# Patient Record
Sex: FEMALE | Race: WHITE | URBAN - METROPOLITAN AREA
[De-identification: names, ages, dates, MRNs, and addresses within clinical notes are randomized per-mention and may not be internally consistent; named-entity substitution may affect disease eponyms.]

---

## 2024-01-29 ENCOUNTER — TELEPHONE (OUTPATIENT)
Dept: OBGYN CLINIC | Facility: CLINIC | Age: 39
End: 2024-01-29

## 2024-01-29 NOTE — TELEPHONE ENCOUNTER
Pts LMP of 12/29/2023 making her 4w2d. Pt states regular cycles of every 28-29 days. States +UPT. Pt informed of both male and female providers and the need to rotate PN appt with all since OB on-call will be the one that delivers her. Pt accepts rotation and wishes to proceed with establishing care. Pt instructed to start OTC PNV with DHA, FOLIC ACID AND IRON.  Pt accepts PC OBN on 2/17/2024    Pt has Scleroderma and was followed by MFM in her previous pregnancies.

## 2024-02-05 ENCOUNTER — OFFICE VISIT (OUTPATIENT)
Dept: FAMILY MEDICINE CLINIC | Facility: CLINIC | Age: 39
End: 2024-02-05
Payer: COMMERCIAL

## 2024-02-05 VITALS
BODY MASS INDEX: 28 KG/M2 | HEIGHT: 63 IN | DIASTOLIC BLOOD PRESSURE: 72 MMHG | WEIGHT: 158 LBS | SYSTOLIC BLOOD PRESSURE: 104 MMHG | TEMPERATURE: 98 F | HEART RATE: 80 BPM

## 2024-02-05 DIAGNOSIS — Z34.91 PREGNANT AND NOT YET DELIVERED IN FIRST TRIMESTER: Primary | ICD-10-CM

## 2024-02-05 DIAGNOSIS — M34.9 SCLERODERMA (HCC): ICD-10-CM

## 2024-02-05 DIAGNOSIS — I73.00 RAYNAUD'S DISEASE WITHOUT GANGRENE: ICD-10-CM

## 2024-02-05 DIAGNOSIS — J34.89 RHINORRHEA: ICD-10-CM

## 2024-02-05 DIAGNOSIS — R52 BODY ACHES: ICD-10-CM

## 2024-02-05 DIAGNOSIS — K21.00 GASTROESOPHAGEAL REFLUX DISEASE WITH ESOPHAGITIS WITHOUT HEMORRHAGE: ICD-10-CM

## 2024-02-05 DIAGNOSIS — K22.4 ESOPHAGEAL DYSMOTILITY: ICD-10-CM

## 2024-02-05 PROCEDURE — 99203 OFFICE O/P NEW LOW 30 MIN: CPT | Performed by: FAMILY MEDICINE

## 2024-02-05 RX ORDER — OMEPRAZOLE 20 MG/1
CAPSULE, DELAYED RELEASE ORAL
COMMUNITY

## 2024-02-05 RX ORDER — OMEPRAZOLE 40 MG/1
40 CAPSULE, DELAYED RELEASE ORAL 2 TIMES DAILY
COMMUNITY
Start: 2022-08-16 | End: 2024-02-05 | Stop reason: DRUGHIGH

## 2024-02-05 RX ORDER — ERGOCALCIFEROL (VITAMIN D2) 10 MCG
1000 TABLET ORAL AS DIRECTED
COMMUNITY

## 2024-02-05 RX ORDER — FAMOTIDINE 10 MG
TABLET ORAL
COMMUNITY

## 2024-02-05 NOTE — PROGRESS NOTES
HPI:    Patient ID: Susie Herr is a 39 year old female.      HPI    Chief Complaint   Patient presents with    Establish Care    Body ache and/or chills     Chest discomfort and cough kids go to  feels fatigue just found out that she is pregnant had negative covid test done        Wt Readings from Last 6 Encounters:   02/05/24 158 lb (71.7 kg)     BP Readings from Last 3 Encounters:   02/05/24 104/72   Lmp 12/29/23    Pmh significant for gerd, systemic sclerosis, raynauds,     Last physical May 2023.    New patient , 6 pregnancies total  High risk pregnancy  Has 2 living children, 3 miscarriages, I termination due to cmv.      Moved from [place] sept 2023.  Has 2 boys.    Kids in     Has bad headaches, coughs, body aches, runny nose since 2/1  Was worse yesterday  Home covid test neg.  No fever. But feels warm.  Boys also have coughs.   is also sick with pink eye and cough.        Review of Systems   Constitutional:  Negative for chills and fever.   HENT:  Positive for rhinorrhea. Negative for sinus pressure, sinus pain and sore throat.    Gastrointestinal:  Negative for constipation, diarrhea, nausea and vomiting.   Musculoskeletal:  Positive for myalgias.   Skin:  Negative for rash.       /72   Pulse 80   Temp 98.3 °F (36.8 °C) (Temporal)   Ht 5' 3\" (1.6 m)   Wt 158 lb (71.7 kg)   LMP 12/29/2023 (Exact Date)   BMI 27.99 kg/m²          Current Outpatient Medications   Medication Sig Dispense Refill    cholecalciferol (VITAMIN D3) 10 MCG (400 UNIT) Oral Tab Take 2.5 tablets (1,000 Units total) by mouth As Directed.      famotidine (PEPCID AC) 10 MG Oral Tab       omeprazole 20 MG Oral Capsule Delayed Release        Allergies:Not on File   PHYSICAL EXAM:     Chief Complaint   Patient presents with    Establish Care    Body ache and/or chills     Chest discomfort and cough kids go to  feels fatigue just found out that she is pregnant had negative covid test done        Physical Exam  Vitals reviewed.   HENT:      Right Ear: Tympanic membrane and ear canal normal.      Left Ear: Tympanic membrane and ear canal normal.      Nose: Rhinorrhea present. No congestion.   Cardiovascular:      Rate and Rhythm: Normal rate and regular rhythm.      Pulses: Normal pulses.      Heart sounds: Normal heart sounds.   Pulmonary:      Effort: Pulmonary effort is normal.      Breath sounds: Normal breath sounds.   Abdominal:      General: There is no distension.      Palpations: There is no mass.      Tenderness: There is no abdominal tenderness. There is no right CVA tenderness, left CVA tenderness, guarding or rebound.      Hernia: No hernia is present.   Musculoskeletal:      Cervical back: Normal range of motion and neck supple.   Neurological:      Mental Status: She is alert.                ASSESSMENT/PLAN:     Encounter Diagnoses   Name Primary?    Pregnant and not yet delivered in first trimester Yes    Body aches     Scleroderma (HCC)     Gastroesophageal reflux disease with esophagitis without hemorrhage     Raynaud's disease without gangrene     Esophageal dysmotility     Rhinorrhea        1. Pregnant and not yet delivered in first trimester  Test done  Precaution while results back   - SARS-CoV-2/Flu A and B/RSV by PCR ([facility]); Future  - SARS-CoV-2/Flu A and B/RSV by PCR ([facility])    2. Body aches    - SARS-CoV-2/Flu A and B/RSV by PCR ([facility]); Future  - SARS-CoV-2/Flu A and B/RSV by PCR ([facility])    3. Scleroderma (HCC)    - RHEUMATOLOGY - INTERNAL    4. Gastroesophageal reflux disease with esophagitis without hemorrhage    - Gastro Referral - In Network    5. Raynaud's disease without gangrene    - RHEUMATOLOGY - INTERNAL    6. Esophageal dysmotility      7. Rhinorrhea    - SARS-CoV-2/Flu A and B/RSV by PCR ([facility]); Future  - SARS-CoV-2/Flu A and B/RSV by PCR ([facility])      Orders Placed This Encounter   Procedures    SARS-CoV-2/Flu A and B/RSV by PCR ([facility])         The  above note was creating using Dragon speech recognition technology. Please excuse any typos    Meds This Visit:  Requested Prescriptions      No prescriptions requested or ordered in this encounter       Imaging & Referrals:  RHEUMATOLOGY - INTERNAL  GASTRO - INTERNAL       ID#[id number]

## 2024-02-06 LAB
FLUAV + FLUBV RNA SPEC NAA+PROBE: NOT DETECTED
FLUAV + FLUBV RNA SPEC NAA+PROBE: NOT DETECTED
RSV RNA SPEC NAA+PROBE: NOT DETECTED
SARS-COV-2 RNA RESP QL NAA+PROBE: NOT DETECTED

## 2024-02-17 ENCOUNTER — NURSE ONLY (OUTPATIENT)
Dept: OBGYN CLINIC | Facility: CLINIC | Age: 39
End: 2024-02-17
Payer: COMMERCIAL

## 2024-02-17 ENCOUNTER — TELEPHONE (OUTPATIENT)
Dept: OBGYN CLINIC | Facility: CLINIC | Age: 39
End: 2024-02-17

## 2024-02-17 DIAGNOSIS — Z34.01 ENCOUNTER FOR SUPERVISION OF NORMAL FIRST PREGNANCY IN FIRST TRIMESTER: ICD-10-CM

## 2024-02-17 DIAGNOSIS — O26.851 SPOTTING AFFECTING PREGNANCY IN FIRST TRIMESTER: Primary | ICD-10-CM

## 2024-02-17 DIAGNOSIS — O09.521 MULTIGRAVIDA OF ADVANCED MATERNAL AGE IN FIRST TRIMESTER: ICD-10-CM

## 2024-02-17 RX ORDER — CHOLECALCIFEROL (VITAMIN D3) 25 MCG
1 TABLET,CHEWABLE ORAL DAILY
COMMUNITY

## 2024-02-17 NOTE — PROGRESS NOTES
Pt called for OBN appt today c/o spotting on Sunday and then again last night and today. Pt states it is light pink, states some cramping on Sunday, but not today. Pt also notes a decrease in breast tenderness and nausea. Spoke to ANALI on-call, recs for quants x2 48-72 hrs apart and have pt seen on Monday in office. Give ER precautions. Pt called and informed.       Normal PN labs ordered. Pt advised all labs must be completed and resulted 2-3 days prior to NPN appt. If labs are not completed and resulted the NPN appt will be cancelled. Pt will contact her insurance on Monday to see if she needs to [facility]. Pt informed placed will be placed for [facility] based on insurance but if wants them changed to [place] to call office.     Pt informed again of both male and female providers and the need to rotate PN appt with all providers since OB on-call will be the one that delivers her. Assisted pt with scheduling NPN appt with MD.       Height: 5 ft 3 in  Weight: 155 lb  BMI: 27.46    Partner's name is Kana Herr  contact #[phone]; race: Dax/White   Occupation: RN    MEDICAL HISTORY    Anemia No    Anesthetic complications No    Anxiety/Depression  No    Autoimmune Disorder Yes Scleroderma    Asthma  No    Cancer No    Diabetes  No    Gyne/breast Surgery Yes [place]-2020  D&E-2018  D&C-2017   Heart Disease No    Hepatitis/Liver Disease  No    History of blood transfusion No    History of abnormal pap Yes HPV-2020   Hypertension  No    Infertility  No    Kidney Disease/Frequent UTIs  Yes UTI's in the Post partum state w/first delivery   Medication Allergies No    Latex Allergies No    Food Allergies  No    Neurological Disorder/Epilepsy No    Operations/Hospitalizations Yes Appy w/bowel perforation became septic and had to have laparotomy-2006    [place] teeth    Also see gyne above    TB exposure  No    Thyroid Dysfunction No    Trauma/Violence  No    Uterine Anomaly  No    Uterine Fibroids  No     Variocosities/DVTs No    Smoker No    Drug usage in prior year No    Alcohol Yes Rare prior pregnancy   Would you accept a blood transfusion? If no, are you a Episcopalian? Yes    No     Will accept blood and blood products       INFECTION HISTORY    Chlamydia Yes In College   Pt or partner have hx of Genital Herpes Yes 1 lesion noted on vaginal area in 2022-Type 1 on swab. Pt has never had an outbreak since-did have profolactic    Gonorrhea No    Hepatitis B No    HIV No    HPV Yes    MRSA No    Syphilis No    Tattoos Yes HCV ordered    Live with someone or Exposed to TB No    Rash or viral illness since LMP  No    Varicella Yes Chicken Poxes in childhood    Pets Yes Dog       GENETICS SCREENING    Genetic Screening    Genetic Screening/Teratology Counseling- Includes patient, baby's father, or anyone in either family with:  Patient's age 35 years or older as of estimated date of delivery: Yes     Thalassemia (Italian, Greek, Mediterranean, or  background): MCV less than 80: No     Neural tube defect (Meningomyelocele, Spina bifida, or Anencephaly): No     Congenital heart defect: No     Down syndrome: No     Héctor-Sachs (Ashkenazi Pentecostal, Cajun, Bengali Martiniquais): No     Canavan disease (Ashkenazi Pentecostal): No     Familial dysautonomia (Ashkenazi Pentecostal): No     Sickle cell disease or trait (): No     Hemophilia or other blood disorders: No     Muscular dystrophy: No    Cystic fibrosis: No     Dimple's chorea: No     Intellectual disability and/or autism: No     Other inherited genetic or chromosomal disorder: No     Maternal metabolic disorder (eg. Type 1 diabetes, PKU): No     Patient or baby's father had child with birth defects not listed above: No     Recurrent pregnancy loss, or a stillbirth: Yes (Comment: Pt has had recurrent loss)     Medications (including supplements, vitamins, herbs, or OTC drugs)/illicit/recreational drugs/alcohol since last menstrual period: Yes     If yes, agent(s)  and strength/dosage: PNV, Pepcid                  MISC    Infant vaccinations  Yes    Pt. Has answered NO 5P questions and has NO  risk factors.    Pt. Given What pregnant women need to know handout.

## 2024-02-17 NOTE — TELEPHONE ENCOUNTER
7w1d Pt called for OBN appt today c/o spotting on Sunday and then again last night and today. Pt states it is light pink, states some cramping on Sunday, but not today. Pt also notes a decrease in breast tenderness and nausea. Spoke to ANALI on-call, recs for quants x2 48-72 hrs apart and have pt seen on Monday in office. Give ER precautions. Pt called and informed.     MBT: A pos    To KEYONAK on-call provider if you can add pt to be seen? Thank you

## 2024-02-19 ENCOUNTER — OFFICE VISIT (OUTPATIENT)
Dept: OBGYN CLINIC | Facility: CLINIC | Age: 39
End: 2024-02-19

## 2024-02-19 ENCOUNTER — LAB ENCOUNTER (OUTPATIENT)
Dept: LAB | Facility: HOSPITAL | Age: 39
End: 2024-02-19
Attending: OBSTETRICS & GYNECOLOGY
Payer: COMMERCIAL

## 2024-02-19 VITALS
DIASTOLIC BLOOD PRESSURE: 75 MMHG | WEIGHT: 158.81 LBS | BODY MASS INDEX: 28 KG/M2 | SYSTOLIC BLOOD PRESSURE: 114 MMHG | HEART RATE: 81 BPM

## 2024-02-19 DIAGNOSIS — O26.851 SPOTTING AFFECTING PREGNANCY IN FIRST TRIMESTER (HCC): ICD-10-CM

## 2024-02-19 DIAGNOSIS — O20.0 THREATENED ABORTION, ANTEPARTUM (HCC): Primary | ICD-10-CM

## 2024-02-19 LAB — B-HCG SERPL-ACNC: 5978.2 MIU/ML

## 2024-02-19 PROCEDURE — 84702 CHORIONIC GONADOTROPIN TEST: CPT

## 2024-02-19 PROCEDURE — 99203 OFFICE O/P NEW LOW 30 MIN: CPT | Performed by: OBSTETRICS & GYNECOLOGY

## 2024-02-19 PROCEDURE — 36415 COLL VENOUS BLD VENIPUNCTURE: CPT

## 2024-02-19 NOTE — TELEPHONE ENCOUNTER
Spoke with KANU.  She states ok to add pt but tell pt to come asap.      Spoke with pt.  Pt states after speaking with RN for OBN appt on 2/17 she did develop bright red bleeding and increased cramping.  Pt did not call the office.  Pt states bleeding and cramping decreased yesterday, but returned to bright red bleeding and cramping today.  Pt did not do the quants due to her heat going out in her home.  Appt booked with KANU today.      Pt requested quant order be sent to [facility] as it will be cheaper. Will await KANU's recs for quants after visit.

## 2024-02-19 NOTE — PROGRESS NOTES
Susie Herr is a 39 year old female  Patient's last menstrual period was 2023 (exact date).   Chief Complaint   Patient presents with    Gyn Problem     Vaginal spotting that started  and increased to bleeding  with cramping per pt      New pt.   LMP 23-- EGA 7 wk.  Had OBN visit.  Has NPN scheduled next week.    Had spotting for a week which got heavier and redder over the weekend with cramping.  She called on 24 and order for bhcg given.  But pt could not do the bhcg since her heater broke over the weekend.      States she stopped having morning sickness last week.      Blood type A+ from previous pregnancy      OBSTETRICS HISTORY:  OB History    Para Term  AB Living   6 2 2 0 3 2   SAB IAB Ectopic Multiple Live Births   2 1 0 0 2       GYNE HISTORY   Pap Date: 22  Pap Result Notes: Neg Pap/HPV    MEDICAL HISTORY:  Past Medical History:   Diagnosis Date    Chlamydia     Decorative tattoo     Esophagus disorder     Genital herpes simplex     Human papilloma virus infection     Raynaud's syndrome     Scleroderma (HCC)      Past Surgical History:   Procedure Laterality Date    D & C      OTHER SURGICAL HISTORY  2006    apendoctomy       SOCIAL HISTORY:  Social History     Socioeconomic History    Marital status:    Tobacco Use    Smoking status: Never    Smokeless tobacco: Never   Vaping Use    Vaping Use: Never used   Substance and Sexual Activity    Alcohol use: Not Currently     Alcohol/week: 2.0 standard drinks of alcohol     Types: 2 Glasses of wine per week     Comment: 2-3 glasses per night not currently due to pregnancy    Drug use: Never     Social Determinants of Health     Financial Resource Strain: Low Risk  (2024)    Financial Resource Strain     Difficulty of Paying Living Expenses: Not hard at all     Med Affordability: No   Food Insecurity: No Food Insecurity (2024)    Food Insecurity     Food Insecurity: Never true    Transportation Needs: No Transportation Needs (2024)    Transportation Needs     Lack of Transportation: No   Stress: No Stress Concern Present (2024)    Stress     Feeling of Stress : No   Housing Stability: Low Risk  (2024)    Housing Stability     Housing Instability: No       MEDICATIONS:  Current Outpatient Medications   Medication Sig Dispense Refill    prenatal vitamin with DHA 27-0.8-228 MG Oral Cap Take 1 capsule by mouth daily.      cholecalciferol (VITAMIN D3) 10 MCG (400 UNIT) Oral Tab Take 2.5 tablets (1,000 Units total) by mouth As Directed.      famotidine (PEPCID AC) 10 MG Oral Tab Take 1 tablet (10 mg total) by mouth nightly as needed.      omeprazole 20 MG Oral Capsule Delayed Release Take 2 capsules (40 mg total) by mouth in the morning and 2 capsules (40 mg total) before bedtime.         ALLERGIES:  No Known Allergies      PHYSICAL EXAM:   /75   Pulse 81   Wt 158 lb 12.8 oz (72 kg)   LMP 2023 (Exact Date)   BMI 28.13 kg/m²   Body mass index is 28.13 kg/m².    Constitutional: well developed, well nourished     Pelvic Exam:  External Genitalia: normal appearance, hair distribution, and no lesions  Urethral Meatus:  normal in size, location, without lesions and prolapse  Bladder:  No fullness, masses or tenderness  Vagina:  Normal appearance without lesions, no abnormal discharge  Cervix:  Normal without tenderness on motion  Uterus: normal in size, contour, position, mobility, without tenderness.  Uterus small.   Scant blood in vault  Adnexa: normal without masses or tenderness    Bedside ultrasound--gestational sac    Assessment & Plan:  1. Threatened , antepartum  Will get bhcg x 2.  If increasing appropriately, then ultrasound for viability      Requested Prescriptions      No prescriptions requested or ordered in this encounter

## 2024-02-21 ENCOUNTER — LAB ENCOUNTER (OUTPATIENT)
Dept: LAB | Facility: HOSPITAL | Age: 39
End: 2024-02-21
Attending: OBSTETRICS & GYNECOLOGY
Payer: COMMERCIAL

## 2024-02-21 DIAGNOSIS — O26.851 SPOTTING AFFECTING PREGNANCY IN FIRST TRIMESTER (HCC): ICD-10-CM

## 2024-02-21 LAB — B-HCG SERPL-ACNC: 5732.4 MIU/ML

## 2024-02-21 PROCEDURE — 36415 COLL VENOUS BLD VENIPUNCTURE: CPT

## 2024-02-21 PROCEDURE — 84702 CHORIONIC GONADOTROPIN TEST: CPT

## 2024-02-23 ENCOUNTER — LAB ENCOUNTER (OUTPATIENT)
Dept: LAB | Facility: HOSPITAL | Age: 39
End: 2024-02-23
Attending: OBSTETRICS & GYNECOLOGY
Payer: COMMERCIAL

## 2024-02-23 PROCEDURE — 84702 CHORIONIC GONADOTROPIN TEST: CPT | Performed by: OBSTETRICS & GYNECOLOGY

## 2024-02-23 PROCEDURE — 36415 COLL VENOUS BLD VENIPUNCTURE: CPT | Performed by: OBSTETRICS & GYNECOLOGY

## 2024-02-26 ENCOUNTER — LAB ENCOUNTER (OUTPATIENT)
Dept: LAB | Facility: HOSPITAL | Age: 39
End: 2024-02-26
Attending: OBSTETRICS & GYNECOLOGY
Payer: COMMERCIAL

## 2024-02-26 DIAGNOSIS — O26.851 SPOTTING AFFECTING PREGNANCY IN FIRST TRIMESTER (HCC): ICD-10-CM

## 2024-02-26 DIAGNOSIS — O20.0 THREATENED ABORTION, ANTEPARTUM (HCC): ICD-10-CM

## 2024-02-26 LAB — B-HCG SERPL-ACNC: 836.1 MIU/ML

## 2024-02-26 PROCEDURE — 36415 COLL VENOUS BLD VENIPUNCTURE: CPT

## 2024-02-26 PROCEDURE — 84702 CHORIONIC GONADOTROPIN TEST: CPT

## 2024-02-27 ENCOUNTER — PATIENT MESSAGE (OUTPATIENT)
Dept: OBGYN CLINIC | Facility: CLINIC | Age: 39
End: 2024-02-27

## 2024-02-27 NOTE — TELEPHONE ENCOUNTER
Pt states bleeding has increased.  Quant on 2/26 dropped considerably.      Component      Latest Ref Rng 2/19/2024 2/21/2024 2/23/2024 2/26/2024   HCG QUANTITATIVE      <=4.2 mIU/mL 5,978.2 (H)  5,732.4 (H)  4,979.1 (H)  836.1 (H)       Legend:  (H) High    Message to JLK.  Weekly quants to zero?

## 2024-02-27 NOTE — TELEPHONE ENCOUNTER
From: Susie Herr  To: Sandra Arteaga  Sent: 2/27/2024 9:39 AM CST  Subject: HGG    Hi Dr. Arteaga,    My bleeding and cramping really picked up as of Saturday and I see that my hcg level from yesterday decreased by alot. Should I plan on getting another hcg level collected at some point?     Thank you,   Kristyn

## 2024-03-06 ENCOUNTER — PATIENT MESSAGE (OUTPATIENT)
Dept: FAMILY MEDICINE CLINIC | Facility: CLINIC | Age: 39
End: 2024-03-06

## 2024-03-06 ENCOUNTER — LAB ENCOUNTER (OUTPATIENT)
Dept: LAB | Facility: HOSPITAL | Age: 39
End: 2024-03-06
Attending: OBSTETRICS & GYNECOLOGY
Payer: COMMERCIAL

## 2024-03-06 DIAGNOSIS — O26.851 SPOTTING AFFECTING PREGNANCY IN FIRST TRIMESTER (HCC): ICD-10-CM

## 2024-03-06 DIAGNOSIS — O20.0 THREATENED ABORTION, ANTEPARTUM (HCC): ICD-10-CM

## 2024-03-06 LAB — B-HCG SERPL-ACNC: 15.3 MIU/ML

## 2024-03-06 PROCEDURE — 36415 COLL VENOUS BLD VENIPUNCTURE: CPT

## 2024-03-06 PROCEDURE — 84702 CHORIONIC GONADOTROPIN TEST: CPT

## 2024-03-07 NOTE — TELEPHONE ENCOUNTER
Dr. Livingston, please see [facility] [employer] message and advise when patient should schedule a PAP test post miscarriage. Thanks.      Future Appointments   Date Time Provider Department Center   4/10/2024 12:20 PM Analilia Burnett MD [facility]   4/17/2024  3:30 PM Nedra Prince MD [facility]

## 2024-03-07 NOTE — TELEPHONE ENCOUNTER
From: Susie Herr  To: Nuria Livingston  Sent: 3/6/2024 2:11 PM CST  Subject: Pap smear     Hi Dr. Livingston,    I unfortunately had a miscarriage around 7 weeks so my pap smear was not completed. Do you perform pap smears or should I schedule with a gynecologist?     Thank you,   Kristyn

## 2024-03-09 NOTE — TELEPHONE ENCOUNTER
"Chief Complaint  Anxiety    Subjective    History of Present Illness      Patient presents to [facility] for   Pt states that she is here for an evaluation with anxiety. Pt states that she doesn't want to be around people. Pt is very emotional and says her parents don't believe in mental illness.    Mood disorder was discussed.    Anxiety  Presents for initial visit. Onset was more than 5 years ago. The problem has been gradually worsening. Symptoms include nervous/anxious behavior and palpitations. Symptoms occur most days. The quality of sleep is fair.            Review of Systems   Cardiovascular: Positive for palpitations.   Psychiatric/Behavioral: The patient is nervous/anxious.        I have reviewed and agree with the HPI information as above.  John Newton MD     Objective   Vital Signs:   /70   Pulse (!) 125   Temp 97.5 °F (36.4 °C)   Resp 20   Ht 162.6 cm (64\")   Wt 56.7 kg (125 lb)   BMI 21.46 kg/m²       Physical Exam  Constitutional:       Appearance: Normal appearance. She is normal weight.   Cardiovascular:      Rate and Rhythm: Normal rate and regular rhythm.      Heart sounds: Normal heart sounds.   Pulmonary:      Effort: Pulmonary effort is normal.      Breath sounds: Normal breath sounds.   Neurological:      Mental Status: She is alert.   Psychiatric:         Mood and Affect: Mood is anxious. Affect is labile and tearful.         Behavior: Behavior normal.          Result Review  Data Reviewed:                   Assessment and Plan      Problem List Items Addressed This Visit        Mental Health    Mood disorder (CMS/HCC) - Primary    Current Assessment & Plan       Has pos mdq  Very symptomatic emotional lability.    Will control mood first then see if anx or adhd need treated.         Anxiety    Current Assessment & Plan     Very high anxiety.          Attention deficit hyperactivity disorder (ADHD), predominantly inattentive type    Current " Future Appointments   Date Time Provider Department Center   3/26/2024  9:30 AM Nuria Livingston MD [facility]   4/10/2024 12:20 PM Analilia Burnett MD [facility]   4/17/2024  3:30 PM [facility]        Assessment & Plan       Will give evaluation to fill out and see if we need to address next month.                   Follow Up   No follow-ups on file.  Patient was given instructions and counseling regarding her condition or for health maintenance advice. Please see specific information pulled into the AVS if appropriate.

## 2024-04-17 ENCOUNTER — TELEPHONE (OUTPATIENT)
Dept: GASTROENTEROLOGY | Facility: CLINIC | Age: 39
End: 2024-04-17

## 2024-04-17 ENCOUNTER — OFFICE VISIT (OUTPATIENT)
Dept: GASTROENTEROLOGY | Facility: CLINIC | Age: 39
End: 2024-04-17

## 2024-04-17 VITALS
BODY MASS INDEX: 28.91 KG/M2 | WEIGHT: 163.19 LBS | DIASTOLIC BLOOD PRESSURE: 75 MMHG | SYSTOLIC BLOOD PRESSURE: 111 MMHG | HEIGHT: 63 IN | HEART RATE: 73 BPM

## 2024-04-17 DIAGNOSIS — K22.4 ESOPHAGEAL DYSMOTILITY: ICD-10-CM

## 2024-04-17 DIAGNOSIS — M34.9 SCLERODERMA (HCC): Primary | ICD-10-CM

## 2024-04-17 DIAGNOSIS — K21.00 GASTROESOPHAGEAL REFLUX DISEASE WITH ESOPHAGITIS WITHOUT HEMORRHAGE: Primary | ICD-10-CM

## 2024-04-17 DIAGNOSIS — M34.9 SCLERODERMA (HCC): ICD-10-CM

## 2024-04-17 DIAGNOSIS — K21.00 GASTROESOPHAGEAL REFLUX DISEASE WITH ESOPHAGITIS WITHOUT HEMORRHAGE: ICD-10-CM

## 2024-04-17 PROCEDURE — 99204 OFFICE O/P NEW MOD 45 MIN: CPT | Performed by: INTERNAL MEDICINE

## 2024-04-17 RX ORDER — ALUMINUM HYDROXIDE AND MAGNESIUM TRISILICATE 80; 14.2 MG/1; MG/1
2 TABLET, CHEWABLE ORAL DAILY
COMMUNITY

## 2024-04-17 RX ORDER — LORATADINE 10 MG/1
10 CAPSULE, LIQUID FILLED ORAL AS NEEDED
COMMUNITY

## 2024-04-17 RX ORDER — SUCRALFATE 1 G/1
TABLET ORAL
Qty: 120 TABLET | Refills: 3 | Status: SHIPPED | OUTPATIENT
Start: 2024-04-17

## 2024-04-17 RX ORDER — OMEPRAZOLE 40 MG/1
40 CAPSULE, DELAYED RELEASE ORAL
Qty: 180 CAPSULE | Refills: 3 | Status: SHIPPED | OUTPATIENT
Start: 2024-04-17

## 2024-04-17 RX ORDER — IBUPROFEN 600 MG/1
600 TABLET ORAL EVERY 6 HOURS
COMMUNITY
Start: 2022-11-17

## 2024-04-17 NOTE — TELEPHONE ENCOUNTER
PPD PA-    Patient is scheduled within the next two weeks please check for PA.  Egd [id number] at [facility] on 7/30/24 at 12:00 Pm   Scleroderma M34.9, esophageal dysmotility K22.4, Gerd with esophagitis K21.00  Thanks!

## 2024-04-17 NOTE — PATIENT INSTRUCTIONS
1. Gastroesophageal reflux disease with esophagitis without hemorrhage    2. Esophageal dysmotility    3. Scleroderma (HCC)        Recommend:  Request records  Omeprazole 40 mg 1-2 times a day before breakfast  KUB to look for stool burden  Here are some reflux precautions:   - Avoid trigger foods  - Anti-reflux measures: raising the head of the bed at night, avoiding tight clothing or belts, avoiding eating late at night and not lying down shortly after mealtime (2-3 hours prior) and achieving weight loss   - Avoid NSAID's, caffeine, peppermints, alcohol, tobacco and foods that incite symptoms   Last EGD was 2019  Given worsening symptoms and no EGD in close to 5 years plan EGD with MAC for reflux and regurgitation    -Schedule EGD w/ possible biopsy/dilation w/MAC    ** If MAC @ [facility]/NE:    - NO alcohol, recreational drugs nor erectile dysfunction mediations 24 hours before procedure(s)   - NO herbal supplements or weight loss medications x 7 days prior to the procedure(s)    ** If MAC @ [facility] or IV twilight - continue all medications as prescribed

## 2024-04-17 NOTE — H&P
[facility] - Gastroenterology  Clinic History and Physical     Chief Complaint   Patient presents with    Gastro-esophageal Reflux     Last Egd was 2019       HPI:   Susie Herr is a 39 year old female patient of Dr. Livingston , with history of scleroderma and raynaud's, presenting for establishing care.    40 omeprazole BID  Gaviscon  Tums  Pepcid also before dinner  Does sleep on a wedge  Still drinks coffee  Cautious with spicy food and acidic food    Goes once a day to the bathroom and normal  Currently more urgency and loser stool  No blood in the stool but has hemorrhoids    Has a 1 and 2 year old currently. Off treatment for scleroderma  Due for echo and PFTs currently-- more for ILD was treatment    At [facility] did not recommend going on treatment again  Going to establish with rheumatology now to discuss    Patient denies any GI symptoms of nausea, vomiting, dyspepsia, dysphagia, hematemesis, abdominal pain, change in bowel habits, thin stools, hematochezia, or melena.  Additionally there is no weight loss and no reported history of chest pain or shortness of breath.    Family History:  No IBD and no colon CA    Prior endoscopies:  No manometry  Just EGD and CT scan to show dilated esophagus    Soc:  Denies smoking  2 glasses wine 3 times a week  Denies other recreational drugs  Not working given move and kids    History, Medications, Allergies, ROS:      Past Medical History:    Chlamydia    Decorative tattoo    Esophagus disorder    Genital herpes simplex    Human papilloma virus infection    Raynaud's syndrome    Scleroderma (HCC)      Past Surgical History:   Procedure Laterality Date    D & c      Egd      Other surgical history  2006    apendoctomy      Family Hx:   Family History   Problem Relation Age of Onset    Cancer Father     Hypertension Father     Other (Other) Maternal Grandmother     Other (Other) Maternal Aunt       Social History:   Social History     Socioeconomic History     Marital status:    Tobacco Use    Smoking status: Never    Smokeless tobacco: Never   Vaping Use    Vaping status: Never Used   Substance and Sexual Activity    Alcohol use: Yes     Alcohol/week: 2.0 standard drinks of alcohol     Types: 2 Glasses of wine per week     Comment: occasional    Drug use: Never     Social Determinants of Health     Financial Resource Strain: Low Risk  (2/11/2024)    Financial Resource Strain     Difficulty of Paying Living Expenses: Not hard at all     Med Affordability: No   Food Insecurity: No Food Insecurity (2/11/2024)    Food Insecurity     Food Insecurity: Never true   Transportation Needs: No Transportation Needs (2/11/2024)    Transportation Needs     Lack of Transportation: No   Stress: No Stress Concern Present (2/11/2024)    Stress     Feeling of Stress : No   Housing Stability: Low Risk  (2/11/2024)    Housing Stability     Housing Instability: No        Medications (Active prior to today's visit):  Current Outpatient Medications   Medication Sig Dispense Refill    ibuprofen 600 MG Oral Tab Take 1 tablet (600 mg total) by mouth every 6 (six) hours.      Omeprazole 40 MG Oral Capsule Delayed Release Take 1 capsule (40 mg total) by mouth 2 (two) times daily before meals. Take 1 capsule by mouth daily before breakfast. 180 capsule 3    famotidine (PEPCID AC) 10 MG Oral Tab Take 1 tablet (10 mg total) by mouth nightly as needed.      Cholecalciferol 50 MCG (2000 UT) Oral Tablet Dispersible Take 1,000 Units by mouth As Directed.      Multiple Vitamins-Minerals (MULTIVIT/MULTIMINERAL ADULT OR) Take 1 tablet by mouth daily.      Alum Hydroxide-Mag Trisilicate (GAVISCON) 80-14.2 MG Oral Chew Tab Chew 2 tablets by mouth daily.      Loratadine 10 MG Oral Cap Take 10 mg by mouth as needed.      prenatal vitamin with DHA 27-0.8-228 MG Oral Cap Take 1 capsule by mouth daily. (Patient not taking: Reported on 4/17/2024)      cholecalciferol (VITAMIN D3) 10 MCG (400 UNIT) Oral Tab  Take 2.5 tablets (1,000 Units total) by mouth As Directed.         Allergies:  No Known Allergies    ROS:   CONSTITUTIONAL:  negative for fevers, rigors  EYES:  negative for diplopia   RESPIRATORY:  negative for severe shortness of breath  CARDIOVASCULAR:  negative for crushing sub-sternal chest pain  GASTROINTESTINAL:  see HPI  GENITOURINARY:  negative for dysuria or gross hematuria  INTEGUMENT/BREAST:  SKIN:  negative for jaundice   ALLERGIC/IMMUNOLOGIC:  negative for hay fever  ENDOCRINE:  negative for cold intolerance and heat intolerance  MUSCULOSKELETAL:  negative for joint effusion/severe erythema  BEHAVIOR/PSYCH:  negative for psychotic behavior      PHYSICAL EXAM:   /75 (BP Location: Right arm, Patient Position: Sitting, Cuff Size: adult)   Pulse 73   Ht 5' 3\" (1.6 m)   Wt 163 lb 3.2 oz (74 kg)   LMP 03/25/2024 (Exact Date)   BMI 28.91 kg/m²       Gen: Patient appears comfortable and in no acute discomfort  HEENT: the sclera appears anicteric, oropharynx clear, mucus membranes appear moist  CV:  the extremities are warm and well perfused   Lung: Moves air well; No labored breathing  Abdomen: soft, non-tender exam in all quadrants without rigidity or guarding, non-distended, no abnormal bowel sounds noted, no masses are palpated  Skin: No jaundice  Ext: no cyanosis, clubbing or edema is evident.   Neuro: Alert and interactive, and gross movements of extremities normal    Labs/Imaging:   Patient's labs and imaging were reviewed and discussed with patient today.      No results for input(s)  in the last 3 years  No results found for: \"GLU\", \"BUN\", \"BUNCREA\", \"CREATSERUM\", \"ANIONGAP\", \"GFR\", \"GFRNAA\", \"GFRAA\", \"CA\", \"OSMOCALC\", \"ALKPHO\", \"AST\", \"ALT\", \"ALKPHOS\", \"BILT\", \"TP\", \"ALB\", \"GLOBULIN\", \"AGRATIO\", \"NA\", \"K\", \"CL\", \"CO2\"  No results found for: \"PTP\", \"PT\", \"INR\"      ASSESSMENT/PLAN:   Susie Herr is a 39 year old  female patient of Dr. Livingston , with history of scleroderma and  raynaud's, presenting for establishing care.    1. Gastroesophageal reflux disease with esophagitis without hemorrhage  - XR ABDOMEN (1 VIEW) (CPT=74018); Future    2. Esophageal dysmotility  - XR ABDOMEN (1 VIEW) (CPT=74018); Future    3. Scleroderma (HCC)  - XR ABDOMEN (1 VIEW) (CPT=74018); Future      Recommend:  Request records  Omeprazole 40 mg 1-2 times a day before breakfast  KUB to look for stool burden  Here are some reflux precautions:   - Avoid trigger foods  - Anti-reflux measures: raising the head of the bed at night, avoiding tight clothing or belts, avoiding eating late at night and not lying down shortly after mealtime (2-3 hours prior) and achieving weight loss   - Avoid NSAID's, caffeine, peppermints, alcohol, tobacco and foods that incite symptoms   Last EGD was 2019  Given worsening symptoms and no EGD in close to 5 years plan EGD with MAC for reflux and regurgitation    -Schedule EGD w/ possible biopsy/dilation w/MAC    ** If MAC @ [facility]/NE:    - NO alcohol, recreational drugs nor erectile dysfunction mediations 24 hours before procedure(s)   - NO herbal supplements or weight loss medications x 7 days prior to the procedure(s)    ** If MAC @ [facility] or IV twilight - continue all medications as prescribed      EGD consent: I have discussed the risks (including risk of delayed/missed diagnosis), benefits, and alternatives to upper endoscopy/enteroscopy with the patient [who demonstrated understanding], including but not limited to the risks of bleeding, infection, pain, as well as the risks of anesthesia and perforation all leading to prolonged hospitalization or surgical intervention. I also specifically mentioned the miss rate of upper endoscopy of 5-10% in the best of all circumstances. It is the patient's responsibility to contact his/her insurance company regarding questions about out-of-pocket cost/benefits and was provided the appropriate diagnostic information/codes.  All questions were answered  to the patient’s satisfaction. The patient has agreed to sign an informed consent and elected to proceed with upper endoscopy/enteroscopy with possible intervention [i.e. polypectomy, ablation, stent placement, etc.] as indicated.          Orders This Visit:  No orders of the defined types were placed in this encounter.      Meds This Visit:  Requested Prescriptions     Signed Prescriptions Disp Refills    Omeprazole 40 MG Oral Capsule Delayed Release 180 capsule 3     Sig: Take 1 capsule (40 mg total) by mouth 2 (two) times daily before meals. Take 1 capsule by mouth daily before breakfast.       Imaging & Referrals:  XR ABDOMEN (1 VIEW) (ABL=[id number])         Nedra Prince MD  4/17/2024

## 2024-04-17 NOTE — TELEPHONE ENCOUNTER
Scheduled for: EGD 98051 with possible biopsy and dilation  Provider Name:  Dr. Prince  Date:  7/30/24  [place] [place]  Sedation:  MAC  Time:  12:00 Pm (pt is aware that [facility] will call the day before to confirm arrival time)   Prep:  Egd -Npo 3 hours before prior to procedure  Meds/Allergies Reconciled?:  Physician Reviewed   Diagnosis with codes:    Gastroesophageal reflux disease with esophagitis without hemorrhage - K21.00  Esophageal dysmotility - k22.4  Scleroderma (HCC)--- m34.9  Was patient informed to call insurance with codes (Y/N):  Yes  Referral sent?:  Referral was sent at the time of electronic surgical scheduling.  EM or [facility] notified?:  I sent an electronic request to Endo Scheduling and received a confirmation today.  Medication Orders:  Pt is aware to NOT take iron pills, herbal meds and diet supplements for 7 days before exam. Also to NOT take any form of alcohol, recreational drugs and any forms of ED meds 24 hours before exam.   Misc Orders:     Patient was informed about the new cancellation policy for his/her procedure. Patient was also given a copy of the cancellation policy at the time of the appointment and verbalized understanding.   Further instructions given by staff:  I provide prep instructions to patient at the time of the appointment and reviewed dateand location, patient verbalized that she understood and is aware to call if she has any questions.

## 2024-04-18 ENCOUNTER — OFFICE VISIT (OUTPATIENT)
Dept: FAMILY MEDICINE CLINIC | Facility: CLINIC | Age: 39
End: 2024-04-18
Payer: COMMERCIAL

## 2024-04-18 VITALS
TEMPERATURE: 98 F | BODY MASS INDEX: 28.35 KG/M2 | HEIGHT: 63 IN | HEART RATE: 68 BPM | WEIGHT: 160 LBS | SYSTOLIC BLOOD PRESSURE: 107 MMHG | DIASTOLIC BLOOD PRESSURE: 70 MMHG

## 2024-04-18 DIAGNOSIS — I73.00 RAYNAUD'S DISEASE WITHOUT GANGRENE: ICD-10-CM

## 2024-04-18 DIAGNOSIS — K22.4 ESOPHAGEAL DYSMOTILITY: ICD-10-CM

## 2024-04-18 DIAGNOSIS — Z79.899 LONG-TERM CURRENT USE OF PROTON PUMP INHIBITOR THERAPY: ICD-10-CM

## 2024-04-18 DIAGNOSIS — Z00.00 WELL ADULT EXAM: Primary | ICD-10-CM

## 2024-04-18 DIAGNOSIS — M34.9 SCLERODERMA (HCC): ICD-10-CM

## 2024-04-18 DIAGNOSIS — K21.9 GASTROESOPHAGEAL REFLUX DISEASE, UNSPECIFIED WHETHER ESOPHAGITIS PRESENT: ICD-10-CM

## 2024-04-18 DIAGNOSIS — Z01.419 ENCOUNTER FOR GYNECOLOGICAL EXAMINATION: ICD-10-CM

## 2024-04-18 PROCEDURE — 99395 PREV VISIT EST AGE 18-39: CPT | Performed by: FAMILY MEDICINE

## 2024-04-18 NOTE — PROGRESS NOTES
HPI:    Patient ID: Susie Herr is a 39 year old female.    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   HENT:  Negative for congestion, dental problem, drooling, ear discharge, ear pain, facial swelling, hearing loss, mouth sores, nosebleeds, postnasal drip, rhinorrhea, sinus pressure, sinus pain, sneezing, sore throat, tinnitus, trouble swallowing and voice change.    Eyes:  Negative for pain, discharge, redness and visual disturbance.   Respiratory:  Negative for cough, shortness of breath and wheezing.    Cardiovascular:  Negative for chest pain, palpitations and leg swelling.   Gastrointestinal:  Negative for abdominal pain, anal bleeding, blood in stool, constipation, diarrhea, nausea, rectal pain and vomiting.        Heartburn, on omepazole 40mg twice a day, gaviscon, pepcid.     Endocrine: Negative for cold intolerance, heat intolerance, polydipsia, polyphagia and polyuria.   Genitourinary:  Negative for decreased urine volume, difficulty urinating, dysuria, flank pain, frequency, menstrual problem, pelvic pain, urgency, vaginal bleeding, vaginal discharge and vaginal pain.        Periods are regular     Musculoskeletal:  Negative for arthralgias, back pain and myalgias.   Skin:  Negative for rash.   Neurological:  Negative for dizziness, seizures, syncope,  weakness, numbness and headaches.   Hematological:  Does not bruise/bleed easily.   Psychiatric/Behavioral:  Negative for behavioral problems, decreased concentration, self-injury, sleep disturbance and suicidal ideas. The patient is not nervous/anxious.        /70   Pulse 68   Temp 97.6 °F (36.4 °C) (Temporal)   Ht 5' 3\" (1.6 m)   Wt 160 lb (72.6 kg)   LMP 04/18/2024 (Exact Date)   BMI 28.34 kg/m²     Past Medical History:    Chlamydia    Decorative tattoo    Esophagus disorder    Genital herpes simplex    Human papilloma virus infection    Miscarriage (HCC)    2-2024    Raynaud's syndrome    Scleroderma ([facility])     Past Surgical History:   Procedure Laterality Date    D & c      Egd      Other surgical history  2006    apendoctomy     Social History     Socioeconomic History    Marital status:      Spouse name: Not on file    Number of children: Not on file    Years of education: Not on file    Highest education level: Not on file   Occupational History    Not on file   Tobacco Use    Smoking status: Never    Smokeless tobacco: Never   Vaping Use    Vaping status: Never Used   Substance and Sexual Activity    Alcohol use: Yes     Alcohol/week: 2.0 standard drinks of alcohol     Types: 2 Glasses of wine per week     Comment: occasional    Drug use: Never    Sexual activity: Not on file   Other Topics Concern    Not on file   Social History Narrative    Not on file     Social Determinants of Health     Financial Resource Strain: Low Risk  (2/11/2024)    Financial Resource Strain     Difficulty of Paying Living Expenses: Not hard at all     Med Affordability: No   Food Insecurity: No Food Insecurity (2/11/2024)    Food Insecurity     Food Insecurity: Never true   Transportation Needs: No Transportation Needs (2/11/2024)    Transportation Needs     Lack of Transportation: No   Stress: No Stress Concern Present (2/11/2024)    Stress     Feeling of Stress : No   Housing Stability: Low Risk  (2/11/2024)     Housing Stability     Housing Instability: No     Housing Instability Emergency: Not on file     Family History   Problem Relation Age of Onset    Cancer Father     Hypertension Father     Other (Other) Maternal Grandmother     Other (Other) Maternal Aunt        Immunization History   Administered Date(s) Administered    Covid-19 Vaccine Moderna 100 mcg/0.5 ml 01/02/2021, 01/30/2021    Covid-19 Vaccine Moderna 50 Mcg/0.25 Ml 11/02/2021    Covid-19 Vaccine Pfizer Bivalent 30mcg/0.3mL 10/13/2022    FLUZONE 6 months and older PFS 0.5 ml (91433) 10/22/2018, 09/03/2020, 09/08/2021, 10/11/2022    Hep B, Unspecified Formulation 01/17/1998, 02/21/1998, 06/20/1998, 11/21/2011    MMR 04/17/1986, 05/22/1993    Pneumococcal (Prevnar 13) 02/13/2020    TDAP 10/19/2009, 04/08/2021, 10/17/2022       Health Maintenance   Topic Date Due    Pap Smear  04/26/2023    COVID-19 Vaccine (5 - 2023-24 season) 09/01/2023    Annual Physical  05/09/2024    Influenza Vaccine (Season Ended) 10/01/2024    DTaP,Tdap,and Td Vaccines (4 - Td or Tdap) 10/17/2032    Annual Depression Screening  Completed    Pneumococcal Vaccine: Birth to 64yrs  Aged Out          Current Outpatient Medications   Medication Sig Dispense Refill    Alum Hydroxide-Mag Carbonate 160-105 MG Oral Chew Tab Chew 2 tablets by mouth daily.      Cholecalciferol 50 MCG (2000 UT) Oral Tablet Dispersible Take 1,000 Units by mouth As Directed.      ibuprofen 600 MG Oral Tab Take 1 tablet (600 mg total) by mouth every 6 (six) hours.      Multiple Vitamins-Minerals (MULTIVIT/MULTIMINERAL ADULT OR) Take 1 tablet by mouth daily.      Alum Hydroxide-Mag Trisilicate (GAVISCON) 80-14.2 MG Oral Chew Tab Chew 2 tablets by mouth daily.      Loratadine 10 MG Oral Cap Take 10 mg by mouth as needed.      Omeprazole 40 MG Oral Capsule Delayed Release Take 1 capsule (40 mg total) by mouth 2 (two) times daily before meals. Take 1 capsule by mouth daily before breakfast. 180 capsule 3    famotidine  (PEPCID AC) 10 MG Oral Tab Take 1 tablet (10 mg total) by mouth nightly as needed.      sucralfate 1 g Oral Tab Dissolve and mix 1 tablet into a glass of water then drink; take 4 times daily after meals and at bedtime (Patient not taking: Reported on 4/18/2024) 120 tablet 3    prenatal vitamin with DHA 27-0.8-228 MG Oral Cap Take 1 capsule by mouth daily. (Patient not taking: Reported on 4/18/2024)       Allergies:No Known Allergies   PHYSICAL EXAM:     Chief Complaint   Patient presents with    Wellness Visit    Pap      Physical Exam  Vitals and nursing note reviewed.   Constitutional:       Appearance: She is well-developed.   HENT:      Head: Normocephalic and atraumatic.      Right Ear: External ear normal.      Left Ear: External ear normal.      Nose: Nose normal.      Mouth/Throat:      Pharynx: No oropharyngeal exudate.   Eyes:      General:         Right eye: No discharge.         Left eye: No discharge.      Conjunctiva/sclera: Conjunctivae normal.      Pupils: Pupils are equal, round, and reactive to light.   Neck:      Thyroid: No thyromegaly.   Cardiovascular:      Rate and Rhythm: Normal rate and regular rhythm.      Heart sounds: Normal heart sounds. No murmur heard.  Pulmonary:      Effort: Pulmonary effort is normal.      Breath sounds: Normal breath sounds. No wheezing.   Abdominal:      General: Bowel sounds are normal.      Palpations: Abdomen is soft. There is no mass.      Tenderness: There is no abdominal tenderness.   Musculoskeletal:         General: No tenderness.      Cervical back: Normal range of motion and neck supple.   Lymphadenopathy:      Cervical: No cervical adenopathy.   Skin:     General: Skin is dry.      Findings: No rash.   Neurological:      Mental Status: She is alert and oriented to person, place, and time.      Cranial Nerves: No cranial nerve deficit.      Motor: No abnormal muscle tone.      Coordination: Coordination normal.      Deep Tendon Reflexes: Reflexes are  normal and symmetric. Reflexes normal.   Psychiatric:         Behavior: Behavior normal.         Thought Content: Thought content normal.         Judgment: Judgment normal.                ASSESSMENT/PLAN:     Return yearly for physicals  Follow up with dentist every 6 months  Follow up with eye doctor yearly  Recommend aerobic exercise for at least 30mins 5 days a week  Yearly flu shot  Tetanus booster every 10 years (Tdap/ Td)  Labs ordered/ or reviewed if done prior to appointment     Encounter Diagnoses   Name Primary?    Well adult exam Yes    Scleroderma (HCC)     Esophageal dysmotility     Raynaud's disease without gangrene     Encounter for gynecological examination     Long-term current use of proton pump inhibitor therapy     Gastroesophageal reflux disease, unspecified whether esophagitis present        1. Well adult exam    - CBC With Differential With Platelet  - Comp Metabolic Panel (14)  - Lipid Panel  - TSH W Reflex To Free T4    2. Scleroderma (HCC)  Stable  Has appointment to see rheumatology    3. Esophageal dysmotility  Egd planned July 2024    4. Raynaud's disease without gangrene  unchanged    5. Encounter for gynecological examination    - [facility] - INTERNAL    6. Long-term current use of proton pump inhibitor therapy    - XR DEXA BONE DENSITOMETRY (CPT=77080); Future  - Vitamin B12  - VITAMIN D, SCREEN [38086][Q]    7. Gastroesophageal reflux disease, unspecified whether esophagitis present  Continue present management \  has seen GI  Egd planned       Orders Placed This Encounter   Procedures    CBC With Differential With Platelet    Comp Metabolic Panel (14)    Lipid Panel    TSH W Reflex To Free T4    Vitamin B12    VITAMIN D, SCREEN [[id number]][Q]       The above note was creating using Dragon speech recognition technology. Please excuse any typos    Meds This Visit:  Requested Prescriptions      No prescriptions requested or ordered in this encounter       Imaging & Referrals:  [facility] - INTERNAL  XR  DEXA BONE DENSITOMETRY (CPT=77080)       ID#[id number]

## 2024-04-24 LAB
ABSOLUTE BASOPHILS: 49 CELLS/UL (ref 0–200)
ABSOLUTE EOSINOPHILS: 62 CELLS/UL (ref 15–500)
ABSOLUTE LYMPHOCYTES: 1550 CELLS/UL (ref 850–3900)
ABSOLUTE MONOCYTES: 279 CELLS/UL (ref 200–950)
ABSOLUTE NEUTROPHILS: 2161 CELLS/UL (ref 1500–7800)
ALBUMIN/GLOBULIN RATIO: 2 (CALC) (ref 1–2.5)
ALBUMIN: 4.5 G/DL (ref 3.6–5.1)
ALKALINE PHOSPHATASE: 46 U/L (ref 31–125)
ALT: 11 U/L (ref 6–29)
AST: 12 U/L (ref 10–30)
BASOPHILS: 1.2 %
BILIRUBIN, TOTAL: 0.7 MG/DL (ref 0.2–1.2)
BUN: 12 MG/DL (ref 7–25)
CALCIUM: 9.1 MG/DL (ref 8.6–10.2)
CARBON DIOXIDE: 28 MMOL/L (ref 20–32)
CHLORIDE: 104 MMOL/L (ref 98–110)
CHOL/HDLC RATIO: 2.6 (CALC)
CHOLESTEROL, TOTAL: 182 MG/DL
CREATININE: 0.76 MG/DL (ref 0.5–0.97)
EGFR: 102 ML/MIN/1.73M2
EOSINOPHILS: 1.5 %
GLOBULIN: 2.3 G/DL (CALC) (ref 1.9–3.7)
GLUCOSE: 86 MG/DL (ref 65–99)
HDL CHOLESTEROL: 70 MG/DL
HEMATOCRIT: 42.4 % (ref 35–45)
HEMOGLOBIN: 13.6 G/DL (ref 11.7–15.5)
LDL-CHOLESTEROL: 97 MG/DL (CALC)
LYMPHOCYTES: 37.8 %
MCH: 28.1 PG (ref 27–33)
MCHC: 32.1 G/DL (ref 32–36)
MCV: 87.6 FL (ref 80–100)
MONOCYTES: 6.8 %
MPV: 10.6 FL (ref 7.5–12.5)
NEUTROPHILS: 52.7 %
NON-HDL CHOLESTEROL: 112 MG/DL (CALC)
PLATELET COUNT: 145 THOUSAND/UL (ref 140–400)
POTASSIUM: 4.5 MMOL/L (ref 3.5–5.3)
PROTEIN, TOTAL: 6.8 G/DL (ref 6.1–8.1)
RDW: 12.9 % (ref 11–15)
RED BLOOD CELL COUNT: 4.84 MILLION/UL (ref 3.8–5.1)
SODIUM: 139 MMOL/L (ref 135–146)
TRIGLYCERIDES: 63 MG/DL
TSH W/REFLEX TO FT4: 1.62 MIU/L
VITAMIN B12: 398 PG/ML (ref 200–1100)
VITAMIN D, 25-OH, TOTAL: 39 NG/ML (ref 30–100)
WHITE BLOOD CELL COUNT: 4.1 THOUSAND/UL (ref 3.8–10.8)

## 2024-04-25 ENCOUNTER — OFFICE VISIT (OUTPATIENT)
Dept: RHEUMATOLOGY | Facility: CLINIC | Age: 39
End: 2024-04-25
Payer: COMMERCIAL

## 2024-04-25 ENCOUNTER — HOSPITAL ENCOUNTER (OUTPATIENT)
Dept: GENERAL RADIOLOGY | Facility: HOSPITAL | Age: 39
Discharge: HOME | End: 2024-04-25
Attending: INTERNAL MEDICINE
Payer: COMMERCIAL

## 2024-04-25 ENCOUNTER — TELEPHONE (OUTPATIENT)
Dept: RHEUMATOLOGY | Facility: CLINIC | Age: 39
End: 2024-04-25

## 2024-04-25 VITALS
HEIGHT: 63 IN | SYSTOLIC BLOOD PRESSURE: 109 MMHG | HEART RATE: 66 BPM | DIASTOLIC BLOOD PRESSURE: 70 MMHG | WEIGHT: 160 LBS | BODY MASS INDEX: 28.35 KG/M2

## 2024-04-25 DIAGNOSIS — I73.00 RAYNAUD'S DISEASE WITHOUT GANGRENE: ICD-10-CM

## 2024-04-25 DIAGNOSIS — M34.9 SCLERODERMA (HCC): Primary | ICD-10-CM

## 2024-04-25 DIAGNOSIS — K21.00 GASTROESOPHAGEAL REFLUX DISEASE WITH ESOPHAGITIS WITHOUT HEMORRHAGE: ICD-10-CM

## 2024-04-25 DIAGNOSIS — K22.4 ESOPHAGEAL DYSMOTILITY: ICD-10-CM

## 2024-04-25 DIAGNOSIS — M34.9 SCLERODERMA (HCC): ICD-10-CM

## 2024-04-25 PROCEDURE — 99204 OFFICE O/P NEW MOD 45 MIN: CPT | Performed by: INTERNAL MEDICINE

## 2024-04-25 PROCEDURE — 74018 RADEX ABDOMEN 1 VIEW: CPT | Performed by: INTERNAL MEDICINE

## 2024-04-25 NOTE — PATIENT INSTRUCTIONS
You were seen today for scleroderma  Symptoms seem stable  Continue the medications for acid reflux  We will hold off on any immunosuppressants  Let me know if your Raynaud's worsens  In the meantime get blood work, pulmonary function test, echocardiogram and CT of the chest  You can see me in 6 months

## 2024-04-25 NOTE — PROGRESS NOTES
Susie Herr is a 39 year old female who presents for   Chief Complaint   Patient presents with    Consult     NP referred by  for Scleroderma    .   HPI:   CC: scleroderma   Consult: referred by PCP Dr. Livingston  Previous rheumatologist Dr. Santos in [place]     This is a 38 yo F with hx of GERD presents to establish care for Scleroderma.  She was diagnosed with scleroderma in 2018 shortly after a pregnancy loss that was attributed to CMV.  She presented with puffy fingers, skin thickening of the arms and hands, GERD, Raynaud's.  She was initially following with rheumatologist at [facility] Dr. Xavier Mueller.  At that time she had significant Raynaud's with sores on her fingers.  She was treated with multiple medications such as nifedipine, losartan, aspirin, Nitropaste, pentoxifylline.  She also was on sildenafil which did help but caused arrhythmia which improved after stopping the medication.  She currently is not taking any medications for her Raynaud's.  In [place] her Raynaud's was overall controlled.  She also was started on CellCept during the initial diagnosis and was on it for about a year.  She feels like it helped with the skin tightening.  She stopped it due to family-planning.  She has 2 boys, a 3-year-old and 17-month-old.  She had a recent miscarriage in March 2024.  She has significant GERD.  She is on omeprazole, Pepcid as needed.  She was seen by GI and recently started on Carafate.  She does have telangiectasias on her face and neck.  She was considering trying laser treatment to remove some of them.  Denies any shortness of breath or trouble swallowing.  Denies any swelling in her joints.  No rash or psoriasis.      Current medications:  Omeprazole, Pepcid  Previous medications:  CellCept 1500 mg twice a day for skin symptoms, she was on it from 2018 till 2020  4 Raynaud's she tried nifedipine, losartan, aspirin, Nitropaste, pentoxifylline, sildenafil.  They either did  not work or she had side effects.  Sildenafil caused arrhythmias  Blood work:  +STEVEN>1:2560 nucleolar, negative scleroderma serologies   Neg APL panel (2020)  Neg SSA, SSB, ESR, CRP (2021)  Normal CBC and CMP (4/2024)  PFT 2/2023: normal  Echocardiogram 1/2023: normal  HRCT 2021: No evidence of ILD, esophageal dilatation and esophageal dysmotility noted    Wt Readings from Last 2 Encounters:   04/25/24 160 lb (72.6 kg)   04/18/24 160 lb (72.6 kg)     Body mass index is 28.34 kg/m².      Current Outpatient Medications   Medication Sig Dispense Refill    Cholecalciferol 50 MCG (2000 UT) Oral Tablet Dispersible Take 1,000 Units by mouth As Directed.      ibuprofen 600 MG Oral Tab Take 1 tablet (600 mg total) by mouth every 6 (six) hours.      Multiple Vitamins-Minerals (MULTIVIT/MULTIMINERAL ADULT OR) Take 1 tablet by mouth daily.      Alum Hydroxide-Mag Trisilicate (GAVISCON) 80-14.2 MG Oral Chew Tab Chew 2 tablets by mouth daily.      Loratadine 10 MG Oral Cap Take 10 mg by mouth as needed.      Omeprazole 40 MG Oral Capsule Delayed Release Take 1 capsule (40 mg total) by mouth 2 (two) times daily before meals. Take 1 capsule by mouth daily before breakfast. 180 capsule 3    sucralfate 1 g Oral Tab Dissolve and mix 1 tablet into a glass of water then drink; take 4 times daily after meals and at bedtime 120 tablet 3    famotidine (PEPCID AC) 10 MG Oral Tab Take 1 tablet (10 mg total) by mouth nightly as needed.      Alum Hydroxide-Mag Carbonate 160-105 MG Oral Chew Tab Chew 2 tablets by mouth daily. (Patient not taking: Reported on 4/25/2024)      prenatal vitamin with DHA 27-0.8-228 MG Oral Cap Take 1 capsule by mouth daily. (Patient not taking: Reported on 4/25/2024)        Past Medical History:    Chlamydia    Decorative tattoo    Esophagus disorder    Genital herpes simplex    Human papilloma virus infection    Miscarriage (HCC)    2-2024    Raynaud's syndrome    Scleroderma (HCC)      Past Surgical History:    Procedure Laterality Date    D & c      Egd      Other surgical history  2006    apendoctomy      Family History   Problem Relation Age of Onset    Cancer Father     Hypertension Father     Other (Other) Maternal Grandmother     Other (Other) Maternal Aunt       Social History:  Social History     Socioeconomic History    Marital status:    Tobacco Use    Smoking status: Never    Smokeless tobacco: Never   Vaping Use    Vaping status: Never Used   Substance and Sexual Activity    Alcohol use: Yes     Alcohol/week: 2.0 standard drinks of alcohol     Types: 2 Glasses of wine per week     Comment: occasional    Drug use: Never     Social Determinants of Health     Financial Resource Strain: Low Risk  (2/11/2024)    Financial Resource Strain     Difficulty of Paying Living Expenses: Not hard at all     Med Affordability: No   Food Insecurity: No Food Insecurity (2/11/2024)    Food Insecurity     Food Insecurity: Never true   Transportation Needs: No Transportation Needs (2/11/2024)    Transportation Needs     Lack of Transportation: No   Stress: No Stress Concern Present (2/11/2024)    Stress     Feeling of Stress : No   Housing Stability: Low Risk  (2/11/2024)    Housing Stability     Housing Instability: No           REVIEW OF SYSTEMS:   Review Of Systems:  Constitutional: No fever, no change in weight or appetitie  Derm: + rashes, no oral ulcers, no alopecia, no photosensitivity, no psoriasis  HEENT: No dry eyes, no dry mouth, + Raynaud's, no nasal ulcers, no parotid swelling, no neck pain, no jaw pain, no temple pain  Eyes: No visual changes,   CVS: No chest pain, no heart disease  RS: No SOB, no Cough, No Pleurtic pain,   GI: No nausea, no vomiiting, no abominal pain, no hx of ulcer, no gastritis, no heartburn, no dyshpagia, no BRBPR or melena  : no dysuria, no hx of miscarriages, no DVT Hx, no hx of OCP,   Neuro: No numbness or tingling, no headache, no hx of seizures,   Psych: no hx of anxiety or  depression  ENDO: no hx of thyroid disease, no hx of DM  Joint/Muscluskeltal: see HPI,   All other ROS are negative.     EXAM:   /70 (BP Location: Left arm, Patient Position: Sitting, Cuff Size: adult)   Pulse 66   Ht 5' 3\" (1.6 m)   Wt 160 lb (72.6 kg)   LMP 04/18/2024 (Exact Date)   BMI 28.34 kg/m²   GEN: AAOx3, NAD  HEENT: EOMI, PERRLA, no injection or icterus, oral mucosa moist, no oral lesions. No lymphadenopathy. No facial rash  CVS: RRR, no murmurs rubs or gallops. Equal 2+ distal pulses.   LUNGS: CTAB, no increased work of breathing  ABDOMEN:  soft NT/ND, +BS, no HSM  SKIN: Telangiectasias noted on the neck, she is currently wearing make-up which is covering the telangiectasias on her face  MSK:  Hands overall puffy and thickened but no evidence of sclerodactyly.  No skin tightening.  Able to make a full fist  NEURO: Cranial nerves II-XII intact grossly. 5/5 strength throughout in both upper and lower extremities, sensation intact.  PSYCH: normal mood    LABS:     Reviewed    IMAGING:     HRCT 2021:  1.  No definite evidence of interstitial lung disease.     2.  Mild small and large airways disease.     3.  Esophageal dilatation with debris to the level of the aortic arch likely represent findings of esophageal dysmotility in the setting of reported scleroderma.  No significant esophageal wall thickening.     ASSESSMENT AND PLAN:     Systemic scleroderma, CREST syndrome  - She was diagnosed in 2018.  Found to have a positive STEVEN nucleolar pattern with symptoms of skin tightening, significant GERD, esophageal dysmotility, Raynaud's, telangiectasias  - She was placed on many medications for her Raynaud's but either did not work or had side effects.  Currently her Raynaud's is stable  - For her acid reflux she is on omeprazole and Pepcid as needed.  Following with GI  - She was previously on CellCept from 2018 till 2020, feels like it helped her skin tightening.  Now off medication and stable  - PFT  2/2023: normal  - Echocardiogram 1/2023: normal  - HRCT 2021: No evidence of ILD, esophageal dilatation and esophageal dysmotility noted  - Plan to repeat pulmonary function test, echocardiogram and CT  - She will stay off immunosuppressants as her symptoms are stable    Thank you for allowing me to participate in this patients care. Pt will f/u in 6 mos    Analilia Burnett MD  4/25/2024  10:57 AM

## 2024-04-25 NOTE — TELEPHONE ENCOUNTER
I ordered high-resolution CT to evaluate for interstitial lung disease as she has scleroderma.  Not sure if PA is needed

## 2024-05-20 LAB
ANA SCREEN, IFA: POSITIVE
C-REACTIVE PROTEIN: <3 MG/L
DNA (DS) ANTIBODY: <1 IU/ML
RHEUMATOID FACTOR: <10 IU/ML
SED RATE BY MODIFIED$WESTERGREN: 2 MM/H

## 2024-05-28 ENCOUNTER — HOSPITAL ENCOUNTER (OUTPATIENT)
Dept: CT IMAGING | Age: 39
Discharge: HOME | End: 2024-05-28
Attending: INTERNAL MEDICINE

## 2024-05-28 DIAGNOSIS — I73.00 RAYNAUD'S DISEASE WITHOUT GANGRENE: ICD-10-CM

## 2024-05-28 DIAGNOSIS — M34.9 SCLERODERMA (HCC): ICD-10-CM

## 2024-05-28 PROCEDURE — 71250 CT THORAX DX C-: CPT | Performed by: INTERNAL MEDICINE

## 2024-05-30 ENCOUNTER — OFFICE VISIT (OUTPATIENT)
Dept: OBGYN CLINIC | Facility: CLINIC | Age: 39
End: 2024-05-30

## 2024-05-30 VITALS
DIASTOLIC BLOOD PRESSURE: 73 MMHG | SYSTOLIC BLOOD PRESSURE: 109 MMHG | WEIGHT: 156.63 LBS | BODY MASS INDEX: 28 KG/M2 | HEART RATE: 73 BPM

## 2024-05-30 DIAGNOSIS — Z01.419 WELL WOMAN EXAM WITH ROUTINE GYNECOLOGICAL EXAM: Primary | ICD-10-CM

## 2024-05-30 DIAGNOSIS — N39.3 STRESS INCONTINENCE: ICD-10-CM

## 2024-05-30 DIAGNOSIS — N94.10 PAIN IN FEMALE GENITALIA ON INTERCOURSE: ICD-10-CM

## 2024-05-30 DIAGNOSIS — N64.4 BREAST PAIN IN FEMALE: ICD-10-CM

## 2024-05-30 DIAGNOSIS — Z12.4 SCREENING FOR CERVICAL CANCER: ICD-10-CM

## 2024-05-30 PROCEDURE — 99395 PREV VISIT EST AGE 18-39: CPT | Performed by: OBSTETRICS & GYNECOLOGY

## 2024-05-30 PROCEDURE — 99212 OFFICE O/P EST SF 10 MIN: CPT | Performed by: OBSTETRICS & GYNECOLOGY

## 2024-05-30 RX ORDER — GARLIC EXTRACT 500 MG
1 CAPSULE ORAL DAILY
COMMUNITY

## 2024-05-30 NOTE — PROGRESS NOTES
Susie Herr is a 39 year old female  Patient's last menstrual period was 2024 (exact date).   Chief Complaint   Patient presents with    Gyn Exam     Annual // Left breast pain per patient    Presenting for well woman exam. Last pap smear was normal 2022.  Previous 3 pap smears were abnormal. Has been having bilateral breast pain pain for the past month. She reports pain with intercourse and stress incontinence.     OBSTETRICS HISTORY:  OB History    Para Term  AB Living   6 2 2 0 4 2   SAB IAB Ectopic Multiple Live Births   3 1 0 0 2       GYNE HISTORY:  Patient's last menstrual period was 2024 (exact date).    History   Sexual Activity    Sexual activity: Yes        Hx Prior Abnormal Pap: Yes  Pap Date: 22  Pap Result Notes: Neg Pap/HPV      MEDICAL HISTORY:  Past Medical History:    Chlamydia    Decorative tattoo    Esophagus disorder    Genital herpes simplex    Human papilloma virus infection    Miscarriage (HCC)    -    Raynaud's syndrome    Scleroderma (HCC)         SURGICAL HISTORY:  Past Surgical History:   Procedure Laterality Date    D & c      Egd      Other surgical history  2006    apendoctomy       SOCIAL HISTORY:  Social History     Socioeconomic History    Marital status:      Spouse name: Not on file    Number of children: Not on file    Years of education: Not on file    Highest education level: Not on file   Occupational History    Not on file   Tobacco Use    Smoking status: Never    Smokeless tobacco: Never   Vaping Use    Vaping status: Never Used   Substance and Sexual Activity    Alcohol use: Yes     Alcohol/week: 2.0 standard drinks of alcohol     Types: 2 Glasses of wine per week     Comment: occasional    Drug use: Never    Sexual activity: Yes   Other Topics Concern    Not on file   Social History Narrative    Not on file     Social Determinants of Health     Financial Resource Strain: Low Risk  (2024)    Financial  Resource Strain     Difficulty of Paying Living Expenses: Not hard at all     Med Affordability: No   Food Insecurity: No Food Insecurity (2/11/2024)    Food Insecurity     Food Insecurity: Never true   Transportation Needs: No Transportation Needs (2/11/2024)    Transportation Needs     Lack of Transportation: No   Stress: No Stress Concern Present (2/11/2024)    Stress     Feeling of Stress : No   Housing Stability: Low Risk  (2/11/2024)    Housing Stability     Housing Instability: No     Housing Instability Emergency: Not on file     Crib or Bassinette: Not on file         Depression Screening (PHQ-2/PHQ-9): Over the LAST 2 WEEKS   Little interest or pleasure in doing things (over the last two weeks)?: Not at all    Feeling down, depressed, or hopeless (over the last two weeks)?: Not at all    PHQ-2 SCORE: 0           MEDICATIONS:    Current Outpatient Medications:     acidophilus-pectin Oral Cap, Take 1 capsule by mouth daily., Disp: , Rfl:     Cholecalciferol 50 MCG (2000 UT) Oral Tablet Dispersible, Take 1,000 Units by mouth As Directed., Disp: , Rfl:     ibuprofen 600 MG Oral Tab, Take 1 tablet (600 mg total) by mouth every 6 (six) hours., Disp: , Rfl:     Multiple Vitamins-Minerals (MULTIVIT/MULTIMINERAL ADULT OR), Take 1 tablet by mouth daily., Disp: , Rfl:     Alum Hydroxide-Mag Trisilicate (GAVISCON) 80-14.2 MG Oral Chew Tab, Chew 2 tablets by mouth daily., Disp: , Rfl:     Loratadine 10 MG Oral Cap, Take 10 mg by mouth as needed., Disp: , Rfl:     Omeprazole 40 MG Oral Capsule Delayed Release, Take 1 capsule (40 mg total) by mouth 2 (two) times daily before meals. Take 1 capsule by mouth daily before breakfast., Disp: 180 capsule, Rfl: 3    famotidine (PEPCID AC) 10 MG Oral Tab, Take 1 tablet (10 mg total) by mouth nightly as needed., Disp: , Rfl:     Alum Hydroxide-Mag Carbonate 160-105 MG Oral Chew Tab, Chew 2 tablets by mouth daily. (Patient not taking: Reported on 4/25/2024), Disp: , Rfl:      sucralfate 1 g Oral Tab, Dissolve and mix 1 tablet into a glass of water then drink; take 4 times daily after meals and at bedtime (Patient not taking: Reported on 5/30/2024), Disp: 120 tablet, Rfl: 3    prenatal vitamin with DHA 27-0.8-228 MG Oral Cap, Take 1 capsule by mouth daily. (Patient not taking: Reported on 4/25/2024), Disp: , Rfl:     ALLERGIES:  No Known Allergies      Review of Systems:  Review of Systems   All other systems reviewed and are negative.       Vitals:    05/30/24 1104   BP: 109/73   Pulse: 73       PHYSICAL EXAM:   Physical Exam  Vitals reviewed.   Constitutional:       Appearance: Normal appearance.   HENT:      Head: Atraumatic.   Eyes:      Pupils: Pupils are equal, round, and reactive to light.   Pulmonary:      Effort: Pulmonary effort is normal.   Chest:   Breasts:     Right: Normal. No bleeding, inverted nipple, mass, nipple discharge, skin change or tenderness.      Left: Normal. No bleeding, inverted nipple, mass, nipple discharge, skin change or tenderness.   Abdominal:      General: Abdomen is flat.      Palpations: Abdomen is soft.      Tenderness: There is no abdominal tenderness.   Genitourinary:     General: Normal vulva.      Exam position: Lithotomy position.      Labia:         Right: No rash, tenderness, lesion or injury.         Left: No rash, tenderness, lesion or injury.       Vagina: Tenderness (along pelvic floor muscles) present.      Cervix: Normal.      Uterus: Normal. Not tender.       Adnexa: Right adnexa normal and left adnexa normal.        Right: No tenderness or fullness.          Left: No tenderness or fullness.     Lymphadenopathy:      Upper Body:      Right upper body: No supraclavicular, axillary or pectoral adenopathy.      Left upper body: No supraclavicular, axillary or pectoral adenopathy.   Skin:     General: Skin is warm and dry.   Neurological:      General: No focal deficit present.      Mental Status: She is alert and oriented to person, place,  and time.   Psychiatric:         Mood and Affect: Mood normal.         Behavior: Behavior normal.         Thought Content: Thought content normal.         Judgment: Judgment normal.           Assessment & Plan:  Susie was seen today for gyn exam.    Diagnoses and all orders for this visit:    Well woman exam with routine gynecological exam    Pain in female genitalia on intercourse  -     Pelvic Floor Therapy - [place] Location    Stress incontinence  -     Pelvic Floor Therapy - [place] Location    Breast pain in female  -     [facility] CARI 2D+3D DIAGNOSTIC [facility]  BILAT (CPT=77066/11594); Future    Screening for cervical cancer  -     ThinPrep PAP Smear; Future  -     Hpv Dna  High Risk , Thin Prep Collect; Future  -     Hpv Dna  High Risk , Thin Prep Collect  -     ThinPrep PAP Smear  -     THINPREP PAP SMEAR ONLY        Requested Prescriptions      No prescriptions requested or ordered in this encounter       Pap smear collected. Mammogram for bilateral breast pain. Encourage SBE. Recommend exams yearly. Pelvic Floor PT referral.

## 2024-05-31 LAB — HPV I/H RISK 1 DNA SPEC QL NAA+PROBE: NEGATIVE

## 2024-06-04 ENCOUNTER — HOSPITAL ENCOUNTER (OUTPATIENT)
Dept: RESPIRATORY THERAPY | Facility: HOSPITAL | Age: 39
End: 2024-06-04
Attending: INTERNAL MEDICINE
Payer: COMMERCIAL

## 2024-06-04 ENCOUNTER — HOSPITAL ENCOUNTER (OUTPATIENT)
Dept: CV DIAGNOSTICS | Facility: HOSPITAL | Age: 39
Discharge: HOME | End: 2024-06-04
Attending: INTERNAL MEDICINE
Payer: COMMERCIAL

## 2024-06-04 DIAGNOSIS — M34.9 SCLERODERMA (HCC): ICD-10-CM

## 2024-06-04 DIAGNOSIS — I73.00 RAYNAUD'S DISEASE WITHOUT GANGRENE: ICD-10-CM

## 2024-06-04 PROCEDURE — 93306 TTE W/DOPPLER COMPLETE: CPT | Performed by: INTERNAL MEDICINE

## 2024-06-06 ENCOUNTER — HOSPITAL ENCOUNTER (OUTPATIENT)
Dept: RESPIRATORY THERAPY | Facility: HOSPITAL | Age: 39
Discharge: HOME | End: 2024-06-06
Attending: INTERNAL MEDICINE
Payer: COMMERCIAL

## 2024-06-06 DIAGNOSIS — I73.00 RAYNAUD'S DISEASE WITHOUT GANGRENE: ICD-10-CM

## 2024-06-06 DIAGNOSIS — M34.9 SCLERODERMA (HCC): ICD-10-CM

## 2024-06-06 PROCEDURE — 94010 BREATHING CAPACITY TEST: CPT | Performed by: INTERNAL MEDICINE

## 2024-06-06 PROCEDURE — 94729 DIFFUSING CAPACITY: CPT | Performed by: INTERNAL MEDICINE

## 2024-06-06 PROCEDURE — 94726 PLETHYSMOGRAPHY LUNG VOLUMES: CPT | Performed by: INTERNAL MEDICINE

## 2024-06-07 NOTE — PROCEDURES
[facility]  part of [facility]     Pulmonary Function Test     Susie Brandy Herr Patient Status:  Outpatient    1985 MRN [id number]   Date of Exam 24 PCP No primary care provider on file.           Spirometry   FEV1: 2.77 98%  FVC: 3.59 106%  FEV1/FVC: 0.77    Lung Volume   T.82 94%  RV : 1.23 97%    Diffusion Capacity   DLCO: 24.65 116%    Flow Volume Loop       Impression   No evidence of obstructive defect seen.  Unable to comment on postbronchodilator responses not performed.  Normal lung volumes.  Normal diffusion capacity    Tatiana Romano DO  Pulmonary Critical Care Medicine  [facility]

## 2024-07-15 ENCOUNTER — TELEPHONE (OUTPATIENT)
Dept: OBGYN CLINIC | Facility: CLINIC | Age: 39
End: 2024-07-15

## 2024-07-15 ENCOUNTER — TELEPHONE (OUTPATIENT)
Facility: CLINIC | Age: 39
End: 2024-07-15

## 2024-07-15 ENCOUNTER — PATIENT MESSAGE (OUTPATIENT)
Dept: GASTROENTEROLOGY | Facility: CLINIC | Age: 39
End: 2024-07-15

## 2024-07-15 DIAGNOSIS — K22.4 ESOPHAGEAL DYSMOTILITY: ICD-10-CM

## 2024-07-15 DIAGNOSIS — M34.9 SCLERODERMA (HCC): ICD-10-CM

## 2024-07-15 DIAGNOSIS — K21.00 GASTROESOPHAGEAL REFLUX DISEASE WITH ESOPHAGITIS, UNSPECIFIED WHETHER HEMORRHAGE: Primary | ICD-10-CM

## 2024-07-15 NOTE — TELEPHONE ENCOUNTER
See TE from 7/15/14. RN canceled EGD. [employer] message sent informing pt to call GI office when ready to reschedule procedure for after delivery of baby.

## 2024-07-15 NOTE — TELEPHONE ENCOUNTER
Dr. Prince,   Pt is scheduled for an EGD with you on 7/30/24. She just found out she is pregnant; she asked if she needs to cancel procedure. I told her that she most likely needs to reschedule for after deliver (given it is elective),  however, I would verify with you (before canceling). Please advise.  Thanks,  Margaret

## 2024-07-15 NOTE — TELEPHONE ENCOUNTER
From: Susie Herr  To: Nedra Prince  Sent: 7/15/2024 1:47 PM CDT  Subject: EGD    Hi Dr. Prince,    I am scheduled for an EGD on July 30th and I just found out last week that I’m pregnant. Should I cancel the procedure and reschedule post-baby?     Thank you,   Kristyn

## 2024-07-15 NOTE — TELEPHONE ENCOUNTER
RN sent [email or web address] message informing pt that procedure was canceled. Instructed pt to call the GI office when ready to reschedule for after delivery. GI office number provided. [email or web address] message read by pt today.    RN submitted surgical case change request and canceled in appt desk.    Nedra Prince MD         7/15/24  2:18 PM  Note      Yes cancel and reschedule after pregnancy          From: Susie Herr  To: Nedra Prince  Sent: 7/15/2024  1:47 PM CDT  Subject: EGD    Hi Dr. Prince,    I am scheduled for an EGD on July 30th and I just found out last week that I’m pregnant. Should I cancel the procedure and reschedule post-baby?     Thank you,   Kristyn

## 2024-07-15 NOTE — TELEPHONE ENCOUNTER
Patient reports last menstrual period 6/10/24 and 28 day cycles. +hpt. Patient agrees to prenatal appointment rotation with our 2 male and 4 female physicians. Assisted with scheduling OB Nurse Education. Advised to take prenatal vitamins that also has iron, folic acid and dha.

## 2024-07-22 ENCOUNTER — TELEPHONE (OUTPATIENT)
Dept: CASE MANAGEMENT | Age: 39
End: 2024-07-22

## 2024-07-22 DIAGNOSIS — Z12.11 COLON CANCER SCREENING: Primary | ICD-10-CM

## 2024-07-22 NOTE — TELEPHONE ENCOUNTER
Good Morning    Patient is scheduled for procedure on 7/30/24 with Gastro, Dr Prince.    Patient has [facility] HMO plan which requires an authorized referral from PCP on file for consult before we can obtain PA for the surgery.    Please review pended referral for GI and add DX codes needed.    Thank you for your help    Earnestine  Managed Care

## 2024-08-01 ENCOUNTER — NURSE ONLY (OUTPATIENT)
Dept: OBGYN CLINIC | Facility: CLINIC | Age: 39
End: 2024-08-01
Payer: COMMERCIAL

## 2024-08-01 DIAGNOSIS — Z34.81 ENCOUNTER FOR SUPERVISION OF OTHER NORMAL PREGNANCY IN FIRST TRIMESTER (HCC): Primary | ICD-10-CM

## 2024-08-01 NOTE — PROGRESS NOTES
Pt called for OBN appt today with no complaints. 1hr gtt (AMA), qual HCG, Varicella and Normal PN labs ordered. Pt advised all labs must be completed and resulted prior to NPN appt. If labs are not completed and resulted the NPN appt will be cancelled. Pt informed again of both male and female providers and the need to rotate PN appt with all providers since OB on-call will be the one that delivers her. Assisted pt with scheduling NPN appt with MD.       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     MEDICAL HISTORY    Anemia No    Anesthetic complications No    Anxiety/Depression  No    Autoimmune Disorder Yes Scleroderma    Asthma  No    Cancer No    Diabetes  No    Gyne/breast Surgery Yes Colposcopy-2021    Dilation and curettage 2017    Therapeutic D&E 2018-CMV positive    Heart Disease No Raynaud's Disease    Hepatitis/Liver Disease  No    History of blood transfusion No    History of abnormal pap Yes Human papillomavirus-2021    Normal pap's since   Hypertension  No    Infertility  No    Kidney Disease/Frequent UTIs  Yes Freq UTI's prior to 2021   Medication Allergies No    Latex Allergies No    Food Allergies  No    Neurological Disorder/Epilepsy No    Operations/Hospitalizations Yes See OB surgical hx    Lap Appy in 2006 that required exploratory abdominal surgery d/t perforated bowel-patient was in ICU    TB exposure  No    Thyroid Dysfunction No    Trauma/Violence  No    Uterine Anomaly  No    Uterine Fibroids  No    Variocosities/DVTs No    Smoker No    Drug usage in prior year No    Alcohol Yes Prior to pregnancy   Would you accept a blood transfusion? If no, are you a Shinto? Yes    No     Will accept blood and blood products        INFECTION HISTORY    Chlamydia Yes In college 2005   Pt or partner have hx of Genital Herpes Yes  Patient has HSV-1, first and only outbreak    Gonorrhea No    Hepatitis B No    HIV No    HPV Yes    MRSA No    Syphilis No    Tattoos Yes    Live with someone or Exposed to TB No    Rash or viral illness since LMP  No    Varicella Yes Chicken poxes in childhood   Pets Yes DOG       GENETICS SCREENING    Genetic Screening    Genetic Screening/Teratology Counseling- Includes patient, baby's father, or anyone in either family with:  Patient's age 35 years or older as of estimated date of delivery: Yes     Thalassemia (Italian, Greek, Mediterranean, or  background): MCV less than 80: No     Neural tube defect (Meningomyelocele, Spina bifida, or Anencephaly): No     Congenital heart defect: No     Down syndrome: No     Héctor-Sachs (Ashkenazi [place], Cajun, [place]): No     Canavan disease (Ashkenazi Uatsdin): No     Familial dysautonomia (Ashkenazi Uatsdin): No     Sickle cell disease or trait (): No     Hemophilia or other blood disorders: No     Muscular dystrophy: No    Cystic fibrosis: No     Tolland's chorea: No     Intellectual disability and/or autism: Yes (Comment: husbands 1st cousin)     If yes, was the person tested for Fragile X?: No     Other inherited genetic or chromosomal disorder: No     Maternal metabolic disorder (eg. Type 1 diabetes, PKU): No     Patient or baby's father had child with birth defects not listed above: No     Recurrent pregnancy loss, or a stillbirth: Yes (Comment: Pt has had several SAB, Pts Aunts have hx of several misarriages)     Medications (including supplements, vitamins, herbs, or OTC drugs)/illicit/recreational drugs/alcohol since last menstrual period: No     If yes, agent(s) and strength/dosage: Prenatal vitamins, omeprazole                  MISC    Infant vaccinations  Yes    Pt. Has answered NO 5P questions and has NO  risk factors.    Pt. Given What pregnant women need to know handout.

## 2024-08-15 LAB
ABSOLUTE BASOPHILS: 27 CELLS/UL (ref 0–200)
ABSOLUTE EOSINOPHILS: 48 CELLS/UL (ref 15–500)
ABSOLUTE LYMPHOCYTES: 1673 CELLS/UL (ref 850–3900)
ABSOLUTE MONOCYTES: 313 CELLS/UL (ref 200–950)
ABSOLUTE NEUTROPHILS: 4740 CELLS/UL (ref 1500–7800)
BASOPHILS: 0.4 %
EOSINOPHILS: 0.7 %
GLUCOSE, GESTATIONAL SCREEN (50G)-130 CUTOFF: 98 MG/DL
HCG, TOTAL, QL: POSITIVE
HEMATOCRIT: 43.9 % (ref 35–45)
HEMOGLOBIN: 14.2 G/DL (ref 11.7–15.5)
LYMPHOCYTES: 24.6 %
MCH: 29.1 PG (ref 27–33)
MCHC: 32.3 G/DL (ref 32–36)
MCV: 90 FL (ref 80–100)
MONOCYTES: 4.6 %
MPV: 10.1 FL (ref 7.5–12.5)
NEUTROPHILS: 69.7 %
PLATELET COUNT: 248 THOUSAND/UL (ref 140–400)
RDW: 12.7 % (ref 11–15)
RED BLOOD CELL COUNT: 4.88 MILLION/UL (ref 3.8–5.1)
RPR (DX) W/REFL TITER AND$CONFIRMATORY TESTING: REACTIVE
RUBELLA ANTIBODY (IGG): 1.93 INDEX
TREPONEMA PALLIDUM AB: NEGATIVE
VARICELLA ZOSTER VIRUS$AB (IGG): 2118 INDEX
WHITE BLOOD CELL COUNT: 6.8 THOUSAND/UL (ref 3.8–10.8)

## 2024-08-20 ENCOUNTER — TELEPHONE (OUTPATIENT)
Dept: OBGYN CLINIC | Facility: CLINIC | Age: 39
End: 2024-08-20

## 2024-08-20 ENCOUNTER — INITIAL PRENATAL (OUTPATIENT)
Dept: OBGYN CLINIC | Facility: CLINIC | Age: 39
End: 2024-08-20
Payer: COMMERCIAL

## 2024-08-20 VITALS
SYSTOLIC BLOOD PRESSURE: 120 MMHG | BODY MASS INDEX: 27 KG/M2 | DIASTOLIC BLOOD PRESSURE: 82 MMHG | HEART RATE: 85 BPM | WEIGHT: 154.38 LBS

## 2024-08-20 DIAGNOSIS — O09.529 AMA (ADVANCED MATERNAL AGE) MULTIGRAVIDA 35+, UNSPECIFIED TRIMESTER (HCC): Primary | ICD-10-CM

## 2024-08-20 DIAGNOSIS — I73.00 RAYNAUD'S DISEASE WITHOUT GANGRENE: ICD-10-CM

## 2024-08-20 DIAGNOSIS — Z34.80 ENCOUNTER FOR SUPERVISION OF OTHER NORMAL PREGNANCY, UNSPECIFIED TRIMESTER (HCC): Primary | ICD-10-CM

## 2024-08-20 DIAGNOSIS — M34.9 SCLERODERMA (HCC): ICD-10-CM

## 2024-08-20 LAB
BILIRUBIN: NEGATIVE
GLUCOSE (URINE DIPSTICK): NEGATIVE MG/DL
KETONES (URINE DIPSTICK): NEGATIVE MG/DL
LEUKOCYTES: NEGATIVE
MULTISTIX EXPIRATION DATE: 9 1 24 DATE
MULTISTIX LOT#: ABNORMAL NUMERIC
NITRITE, URINE: NEGATIVE
PH, URINE: 6 (ref 4.5–8)
PROTEIN (URINE DIPSTICK): NEGATIVE MG/DL
SPECIFIC GRAVITY: 1.02 (ref 1–1.03)
UROBILINOGEN,SEMI-QN: 0.2 MG/DL (ref 0–1.9)

## 2024-08-20 PROCEDURE — 81002 URINALYSIS NONAUTO W/O SCOPE: CPT | Performed by: OBSTETRICS & GYNECOLOGY

## 2024-08-20 PROCEDURE — 76801 OB US < 14 WKS SINGLE FETUS: CPT | Performed by: OBSTETRICS & GYNECOLOGY

## 2024-08-20 RX ORDER — ASPIRIN/SOD BICARB/CITRIC ACID 324 MG
325 TABLET, EFFERVESCENT ORAL EVERY 6 HOURS PRN
COMMUNITY

## 2024-08-20 NOTE — TELEPHONE ENCOUNTER
Pt wishes / needs the following. Please do referral & send MFM order for:    [ x ]  Genetic Screening    DX:  AMA, Scleroderma, Raynaud's    [ x ]  medical consult    [  ]  16 wk cervical length    [ x ]  level 2 ultrasound    [   ] fetal echo    [  ]  growth ultrasound at 32 wks    [ x ]  serial ultrasound    [ x ]  NSTs once 36 weeks

## 2024-08-21 LAB
CHLAMYDIA TRACHOMATIS$RNA, TMA: NOT DETECTED
NEISSERIA GONORRHOEAE$RNA, TMA: NOT DETECTED

## 2024-08-27 NOTE — PROGRESS NOTES
Outpatient Maternal-Fetal Medicine Consultation    Dear Dr. Persaud    Thank you for requesting ultrasound evaluation and maternal fetal medicine consultation on your patient Susie Herr.  As you are aware she is a 39 year old female  with a quezada pregnancy and an Estimated Date of Delivery: 3/17/25.  A maternal-fetal medicine consultation was requested secondary to advanced maternal age advanced maternal age and scleroderma .  Her prenatal records and labs were reviewed.    HISTORY  # 1 - Date: , Sex: None, Weight: None, GA: 10w0d, Type: Spontaneous , Apgar1: None, Apgar5: None, Living: None, Birth Comments: D&C    # 2 - Date: 2018, Sex: Male, Weight: None, GA: 24w0d, Type: Induced , Apgar1: None, Apgar5: None, Living: None, Birth Comments: Had CMV    # 3 - Date: 21, Sex: Male, Weight: 6 lb 10 oz (3.005 kg), GA: 40w3d, Type: Normal spontaneous vaginal delivery, Apgar1: None, Apgar5: None, Living: Living, Birth Comments: Forcep delivery    # 4 - Date: 2022, Sex: None, Weight: None, GA: 6w0d, Type: Spontaneous , Apgar1: None, Apgar5: None, Living: None, Birth Comments: None    # 5 - Date: 11/15/22, Sex: Male, Weight: 6 lb 1 oz (2.75 kg), GA: 37w0d, Type: Normal spontaneous vaginal delivery, Apgar1: None, Apgar5: None, Living: Living, Birth Comments: Bleeding issue in pregnancy d/t placenta previa, that resolved    # 6 - Date: 24, Sex: None, Weight: None, GA: 7w3d, Type: Spontaneous , Apgar1: None, Apgar5: None, Living: None, Birth Comments: None    # 7 - Date: None, Sex: None, Weight: None, GA: None, Type: None, Apgar1: None, Apgar5: None, Living: None, Birth Comments: None    Past Medical History  The patient  has a past medical history of Chlamydia, Decorative tattoo, Esophagus disorder, Genital herpes simplex, Human papilloma virus infection, Miscarriage (HCC), Raynaud's syndrome, and Scleroderma (HCC).    Past Surgical History  The  patient  has a past surgical history that includes other surgical history (2006); d & c; and egd.    Family History  The patient She indicated that her mother is alive. She indicated that her father is alive. She indicated that the status of her maternal grandmother is unknown. She indicated that the status of her maternal aunt is unknown and reported the following: willie diseasea, sarcododis and one with lupus.    Medications:   Current Outpatient Medications:     Aspirin Effervescent (JANET-SELTZER) 325 MG Oral Effer Tab, Take 325 mg by mouth every 6 (six) hours as needed., Disp: , Rfl:     acidophilus-pectin Oral Cap, Take 1 capsule by mouth daily. (Patient not taking: Reported on 8/1/2024), Disp: , Rfl:     Alum Hydroxide-Mag Carbonate 160-105 MG Oral Chew Tab, Chew 2 tablets by mouth daily., Disp: , Rfl:     Cholecalciferol 50 MCG (2000 UT) Oral Tablet Dispersible, Take 1,000 Units by mouth As Directed. (Patient not taking: Reported on 8/20/2024), Disp: , Rfl:     Multiple Vitamins-Minerals (MULTIVIT/MULTIMINERAL ADULT OR), Take 1 tablet by mouth daily. (Patient not taking: Reported on 8/1/2024), Disp: , Rfl:     Alum Hydroxide-Mag Trisilicate (GAVISCON) 80-14.2 MG Oral Chew Tab, Chew 2 tablets by mouth daily. (Patient not taking: Reported on 8/20/2024), Disp: , Rfl:     Loratadine 10 MG Oral Cap, Take 10 mg by mouth as needed., Disp: , Rfl:     Omeprazole 40 MG Oral Capsule Delayed Release, Take 1 capsule (40 mg total) by mouth 2 (two) times daily before meals. Take 1 capsule by mouth daily before breakfast., Disp: 180 capsule, Rfl: 3    sucralfate 1 g Oral Tab, Dissolve and mix 1 tablet into a glass of water then drink; take 4 times daily after meals and at bedtime (Patient not taking: Reported on 5/30/2024), Disp: 120 tablet, Rfl: 3    prenatal vitamin with DHA 27-0.8-228 MG Oral Cap, Take 1 capsule by mouth daily., Disp: , Rfl:     famotidine (PEPCID AC) 10 MG Oral Tab, Take 1 tablet (10 mg total) by mouth  nightly as needed., Disp: , Rfl:   Allergies: No Known Allergies    PHYSICAL EXAMINATION:  /65   Pulse 81   Wt 154 lb (69.9 kg)   LMP 06/10/2024 (Exact Date)   BMI 27.28 kg/m²   General: alert and oriented,no acute distress  Abdomen: gravid, soft, non-tender  Extremities: non-tender, no edema    OBSTETRIC ULTRASOUND  The patient had a first trimester ultrasound today which revealed normal first trimester anatomy with size consistent with dates.     Single IUP with cardiac activity 163 bpm  Amniotic fluid is normal.  Placenta location is anterior  The CRL is consistent with gestational age. Nasal bone present. The nuchal translucency measures 1.5 mm. This is within normal limits.   The maternal uterus and ovaries appear normal.    See PACS/Imaging Tab For Complete Ultrasound Report    I interpreted the results and reviewed them with the patient.    DISCUSSION  During her visit we discussed and reviewed the following issues:    ADVANCED MATERNAL AGE    Background  I reviewed with the patient that pregnancies in women of advanced maternal age (35 or older at delivery) are associated with elevated risks.  Specifically, there is a higher rate of:    Fetal malformations  Preeclampsia  Gestational diabetes  Intrauterine fetal death    As a result, enhanced pregnancy surveillance is advised for these patients including a comprehensive ultrasound to assess for fetal malformations  (at 20 weeks) and a third trimester ultrasound assessment for fetal growth (at 32 weeks).  In addition, weekly NST's (initiating at 36 weeks gestation for women 35-39 years and at 32 weeks gestation for women 40 years and older) are also advised.  Routine obstetric care is more than adequate to assess for gestational diabetes and preeclampsia; hence, no further significant alterations in obstetric care are advised.    Fetal Aneuploidy    We also discussed the increased risk of chromosomal abnormalities associated with advanced maternal  age.  I reviewed that an ultrasound examination cannot reliably exclude potential genetic abnormalities. Given that the patient will be 40 years old at the time of delivery I reviewed that her risk (at delivery) of having a  with any chromosome abnormality is 1:66 and with trisomy 21 is 1: 106.    Invasive Testing    I offered invasive genetic testing (amniocentesis, chorionic villus sampling) after reviewing the diagnostic accuracy of these tests as well as the procedure associated loss rate (1:500 for genetic amniocentesis).    She ultimately does not desire invasive genetic testing.     Non-invasive Pregnancy Testing (NIPT)    I reviewed current non-invasive screening options.  Currently non-invasive pregnancy testing (NIPT) offers the highest detection rate (with the lowest false positive rate) for the detection of fetal aneuploidy amongst high-risk patients.  The limitations of detailed mid-trimester sonography was reviewed with the patient.  First trimester screening and second trimester multiple-marker serum serum screening as alternative aneuploidy screening options were also reviewed.  However, both of these tests are associated with lower detection and higher false positive rates.    The patient ultimately elected to obtain NIPT (cell-free fetal DNA assessment).  We expect the results to be available within 7 days; the patient will be informed when the results are available.    SCLERODERMA    Scleroderma may or may not be associated with systemic disease.  Localized scleroderma (morphea or linear scleroderma) is usually limited to the skin of the hands and is often associated with Raynaud phenomenon.  Raynaud's phenomenon is a limited form of scleroderma.  Pregnancy outcomes are very good in women with localized scleroderma.      Systemic scleroderma is a chronic connective tissue disease characterized by fibrosis of the skin and internal organs (GI tract, esophagus, kidneys and lungs) and a  progressive obliterative vasculopathy. It is uncommon and occuring in about 10-15 individuals per million per year. There is about 10:1 female to male ratio.  A variety of pulmonary lesions may occur leading to pulmonary hypertension. It may affect the heart resulting in dysrhythmia and hypertension. It may affect the kidneys resulting in renal insufficiency and proteinuria.         The incidence of scleroderma in pregnancy is unknown but it is uncommon. Limited data seems to suggest an increased risk of spontaneous loss, fetal growth restriction, PIH and premature delivery from pregnancy complications. Limited data seems to suggest the disease remains unchanged in about 1/3 of the cases, worsens in about half of the cases and improves in about 11% of the cases. During pregnancy glucocorticoids may be used to treat any flare.         IMPRESSION:  IUP at 12w1d  AMA: obtained cell free fetal DNA  today, declined invasive testing  Scleroderma - stable    RECOMMENDATIONS:  Continue care with Dr. Hung Guerrero II at 20 weeks  Closed surveillance for potential PIH and flares  Monthly growth ultrasounds in the third trimester  Weekly NST by 32 weeks and sooner if the clinical situation dictates      Thank you for allowing me to participate in the care of your patient.  Please do not hesitate to contact me if additional questions or concerns arise.      Nishant Quevedo M.D.    40 minutes spent in review of records, patient consultation, documentation and coordination of care.  The relevant clinical matter(s) are summarized above.     Note to patient and family  The 21st Century Cures Act makes medical notes available to patients in the interest of transparency.  However, please be advised that this is a medical document.  It is intended as [id number] communication.  It is written and medical language may contain abbreviations or verbiage that are technical and unfamiliar.  It may appear blunt or direct.  Medical  documents are intended to carry relevant information, facts as evident, and the clinical opinion of the practitioner.

## 2024-09-03 ENCOUNTER — HOSPITAL ENCOUNTER (OUTPATIENT)
Dept: PERINATAL CARE | Facility: HOSPITAL | Age: 39
Discharge: HOME | End: 2024-09-03
Attending: OBSTETRICS & GYNECOLOGY
Payer: COMMERCIAL

## 2024-09-03 ENCOUNTER — LAB ENCOUNTER (OUTPATIENT)
Dept: LAB | Facility: HOSPITAL | Age: 39
End: 2024-09-03
Attending: OBSTETRICS & GYNECOLOGY
Payer: COMMERCIAL

## 2024-09-03 VITALS
DIASTOLIC BLOOD PRESSURE: 65 MMHG | HEART RATE: 81 BPM | BODY MASS INDEX: 27 KG/M2 | SYSTOLIC BLOOD PRESSURE: 101 MMHG | WEIGHT: 154 LBS

## 2024-09-03 DIAGNOSIS — O09.529 ADVANCED MATERNAL AGE IN MULTIGRAVIDA (HCC): ICD-10-CM

## 2024-09-03 DIAGNOSIS — I73.00 RAYNAUD'S DISEASE WITHOUT GANGRENE: ICD-10-CM

## 2024-09-03 DIAGNOSIS — Z36.9 FIRST TRIMESTER SCREENING (HCC): ICD-10-CM

## 2024-09-03 DIAGNOSIS — O09.529 AMA (ADVANCED MATERNAL AGE) MULTIGRAVIDA 35+, UNSPECIFIED TRIMESTER (HCC): ICD-10-CM

## 2024-09-03 DIAGNOSIS — M34.9 SCLERODERMA (HCC): ICD-10-CM

## 2024-09-03 DIAGNOSIS — Z36.9 FIRST TRIMESTER SCREENING (HCC): Primary | ICD-10-CM

## 2024-09-03 PROCEDURE — 76813 OB US NUCHAL MEAS 1 GEST: CPT | Performed by: OBSTETRICS & GYNECOLOGY

## 2024-09-08 LAB
GESTATIONAL AGE > OR = 9W:: YES
TEST RESULT: NEGATIVE
TRISOMY 13 (PATAU SYNDROME): NEGATIVE
TRISOMY 18 (EDWARDS SYNDROME): NEGATIVE
TRISOMY 21 (DOWN SYNDROME): NEGATIVE

## 2024-09-09 NOTE — PROGRESS NOTES
Test results reviewed and are normal.  LOW-RISK for trisomies 21, 18 and 13.    Results released via My Chart    Nishant Quevedo M.D.  Maternal-Fetal Medicine

## 2024-09-11 ENCOUNTER — TELEPHONE (OUTPATIENT)
Dept: OBGYN CLINIC | Facility: CLINIC | Age: 39
End: 2024-09-11

## 2024-09-11 NOTE — TELEPHONE ENCOUNTER
Message to on call provider.  Please review initial prenatal labs.  RPR was reactive, RPR titer is 1:2, treponema pallidum Ab was negative.

## 2024-09-11 NOTE — TELEPHONE ENCOUNTER
Spoke to Kathy at [facility] regarding +RPR, but negative trep. Informed no hx of Syphilis and no active s/s. States understanding.

## 2024-09-11 NOTE — TELEPHONE ENCOUNTER
Nontreponemal antibodies detected, but syphilis is   unlikely, possible biological false positive result.   Clinical evaluation should be performed to identify   signs, symptoms, or history of past infection. If   recent exposure is suspected, submit a new sample in   2-4 weeks.

## 2024-09-11 NOTE — TELEPHONE ENCOUNTER
Kathy from [facility] dept called back states she keeps missing the call back the last call back was from nurse Casandra. Please call back

## 2024-09-12 ENCOUNTER — NURSE ONLY (OUTPATIENT)
Dept: INTERNAL MEDICINE CLINIC | Facility: HOSPITAL | Age: 39
End: 2024-09-12
Attending: PREVENTIVE MEDICINE

## 2024-09-12 DIAGNOSIS — Z00.00 WELLNESS EXAMINATION: Primary | ICD-10-CM

## 2024-09-12 PROCEDURE — 86480 TB TEST CELL IMMUN MEASURE: CPT

## 2024-09-13 LAB
M TB IFN-G CD4+ T-CELLS BLD-ACNC: 0 IU/ML
M TB TUBERC IFN-G BLD QL: NEGATIVE
M TB TUBERC IGNF/MITOGEN IGNF CONTROL: >10 IU/ML
QFT TB1 AG MINUS NIL: 0.01 IU/ML
QFT TB2 AG MINUS NIL: 0.01 IU/ML

## 2024-09-17 ENCOUNTER — ROUTINE PRENATAL (OUTPATIENT)
Dept: OBGYN CLINIC | Facility: CLINIC | Age: 39
End: 2024-09-17
Payer: COMMERCIAL

## 2024-09-17 VITALS
SYSTOLIC BLOOD PRESSURE: 104 MMHG | WEIGHT: 154.63 LBS | HEART RATE: 98 BPM | DIASTOLIC BLOOD PRESSURE: 71 MMHG | BODY MASS INDEX: 27 KG/M2

## 2024-09-17 DIAGNOSIS — Z34.92 ENCOUNTER FOR SUPERVISION OF NORMAL PREGNANCY IN SECOND TRIMESTER, UNSPECIFIED GRAVIDITY (HCC): Primary | ICD-10-CM

## 2024-09-17 LAB
BILIRUBIN: NEGATIVE
GLUCOSE (URINE DIPSTICK): NEGATIVE MG/DL
KETONES (URINE DIPSTICK): NEGATIVE MG/DL
MULTISTIX LOT#: ABNORMAL NUMERIC
NITRITE, URINE: NEGATIVE
OCCULT BLOOD: NEGATIVE
PH, URINE: 6 (ref 4.5–8)
PROTEIN (URINE DIPSTICK): NEGATIVE MG/DL
SPECIFIC GRAVITY: 1.01 (ref 1–1.03)
UROBILINOGEN,SEMI-QN: 0.2 MG/DL (ref 0–1.9)

## 2024-09-17 PROCEDURE — 81002 URINALYSIS NONAUTO W/O SCOPE: CPT | Performed by: OBSTETRICS & GYNECOLOGY

## 2024-09-17 NOTE — PROGRESS NOTES
Feeling well. NO cramping/vb.  Has level 2 scheduled.  Low risk genetics per patient.  RTC 4 wks.

## 2024-10-14 ENCOUNTER — ROUTINE PRENATAL (OUTPATIENT)
Dept: OBGYN CLINIC | Facility: CLINIC | Age: 39
End: 2024-10-14
Payer: COMMERCIAL

## 2024-10-14 VITALS
HEART RATE: 90 BPM | SYSTOLIC BLOOD PRESSURE: 112 MMHG | BODY MASS INDEX: 28 KG/M2 | WEIGHT: 160 LBS | DIASTOLIC BLOOD PRESSURE: 70 MMHG

## 2024-10-14 DIAGNOSIS — Z34.92 ENCOUNTER FOR SUPERVISION OF NORMAL PREGNANCY IN SECOND TRIMESTER, UNSPECIFIED GRAVIDITY (HCC): Primary | ICD-10-CM

## 2024-10-14 LAB
BILIRUBIN: NEGATIVE
GLUCOSE (URINE DIPSTICK): NEGATIVE MG/DL
KETONES (URINE DIPSTICK): NEGATIVE MG/DL
LEUKOCYTES: NEGATIVE
MULTISTIX LOT#: 57 NUMERIC
NITRITE, URINE: NEGATIVE
OCCULT BLOOD: NEGATIVE
PH, URINE: 6 (ref 4.5–8)
PROTEIN (URINE DIPSTICK): NEGATIVE MG/DL
SPECIFIC GRAVITY: 1.01 (ref 1–1.03)
UROBILINOGEN,SEMI-QN: 0.2 MG/DL (ref 0–1.9)

## 2024-10-22 ENCOUNTER — PATIENT MESSAGE (OUTPATIENT)
Dept: RHEUMATOLOGY | Facility: CLINIC | Age: 39
End: 2024-10-22

## 2024-10-22 ENCOUNTER — IMMUNIZATION (OUTPATIENT)
Dept: LAB | Age: 39
End: 2024-10-22
Attending: EMERGENCY MEDICINE
Payer: COMMERCIAL

## 2024-10-22 DIAGNOSIS — Z23 NEED FOR VACCINATION: Primary | ICD-10-CM

## 2024-10-22 PROCEDURE — 90471 IMMUNIZATION ADMIN: CPT

## 2024-10-22 PROCEDURE — 90656 IIV3 VACC NO PRSV 0.5 ML IM: CPT

## 2024-10-28 ENCOUNTER — TELEPHONE (OUTPATIENT)
Dept: PERINATAL CARE | Facility: HOSPITAL | Age: 39
End: 2024-10-28

## 2024-10-28 ENCOUNTER — HOSPITAL ENCOUNTER (OUTPATIENT)
Dept: PERINATAL CARE | Facility: HOSPITAL | Age: 39
End: 2024-10-28
Attending: OBSTETRICS & GYNECOLOGY
Payer: COMMERCIAL

## 2024-10-28 ENCOUNTER — HOSPITAL ENCOUNTER (OUTPATIENT)
Dept: PERINATAL CARE | Facility: HOSPITAL | Age: 39
Discharge: HOME | End: 2024-10-28
Attending: OBSTETRICS & GYNECOLOGY
Payer: COMMERCIAL

## 2024-10-28 VITALS
WEIGHT: 162 LBS | HEART RATE: 93 BPM | SYSTOLIC BLOOD PRESSURE: 124 MMHG | DIASTOLIC BLOOD PRESSURE: 77 MMHG | BODY MASS INDEX: 29 KG/M2

## 2024-10-28 DIAGNOSIS — Z03.75 SUSPECTED SHORTENING OF CERVIX NOT FOUND: ICD-10-CM

## 2024-10-28 DIAGNOSIS — O09.522 MULTIGRAVIDA OF ADVANCED MATERNAL AGE IN SECOND TRIMESTER (HCC): Primary | ICD-10-CM

## 2024-10-28 DIAGNOSIS — O26.852 SPOTTING AFFECTING PREGNANCY IN SECOND TRIMESTER (HCC): ICD-10-CM

## 2024-10-28 PROCEDURE — 76811 OB US DETAILED SNGL FETUS: CPT | Performed by: OBSTETRICS & GYNECOLOGY

## 2024-10-28 PROCEDURE — 76817 TRANSVAGINAL US OBSTETRIC: CPT

## 2024-10-28 NOTE — PROGRESS NOTES
Outpatient Maternal-Fetal Medicine Consultation    Dear Dr. Persaud    Thank you for requesting ultrasound evaluation and maternal fetal medicine consultation on your patient Susie Herr.  As you are aware she is a 39 year old female  with a quezada pregnancy and an Estimated Date of Delivery: 3/17/25.  A maternal-fetal medicine consultation was requested secondary to advanced maternal age advanced maternal age and scleroderma .  Her prenatal records and labs were reviewed.    She has had some cramping last night.  Nothing that is regular or feels like a contraction. She noted some spotting last night.  She has had spotting in all of her pregnancies that when to term.    OB History    Para Term  AB Living   7 2 2   4 2   SAB IAB Ectopic Multiple Live Births   3 1     2      # Outcome Date GA Lbr Tin/2nd Weight Sex Type Anes PTL Lv   7 Current            6 SAB 24 7w3d    SAB None     5 Term 11/15/22 37w0d  6 lb 1 oz (2.75 kg) M NORMAL SPONT EPI Y BERTHA      Birth Comments: Bleeding issue in pregnancy d/t placenta previa, that resolved   4 SAB 2022 6w0d    SAB None     3 Term 21 40w3d  6 lb 10 oz (3.005 kg) M NORMAL SPONT EPI N BERTHA      Birth Comments: Forcep delivery      Complications: Temp 100.4 or greater, Intraamniotic Infection, Meconium   2 IAB 2018 24w0d   M THERAPEUTIC Gen        Birth Comments: Had CMV   1 2017 10w0d    SAB Gen        Birth Comments: D&C         Past Medical History  The patient  has a past medical history of Chlamydia, Decorative tattoo, Esophagus disorder, Genital herpes simplex, Human papilloma virus infection, Miscarriage ([facility]), Raynaud's syndrome, and Scleroderma ([facility]).    Past Surgical History  The patient  has a past surgical history that includes other surgical history (); d & c; egd; and other accessory.    Family History  The patient She indicated that her mother is alive. She indicated that her father is alive. She  indicated that the status of her maternal grandmother is unknown. She indicated that the status of her maternal aunt is unknown and reported the following: willie diseasea, sarcododis and one with lupus.     Current Outpatient Medications   Medication Sig Dispense Refill    Aspirin Effervescent (JANET-SELTZER) 325 MG Oral Effer Tab Take 325 mg by mouth every 6 (six) hours as needed.      Cholecalciferol 50 MCG (2000 UT) Oral Tablet Dispersible Take 1,000 Units by mouth As Directed.      Alum Hydroxide-Mag Trisilicate (GAVISCON) 80-14.2 MG Oral Chew Tab Chew 2 tablets by mouth daily.      Loratadine 10 MG Oral Cap Take 10 mg by mouth as needed.      Omeprazole 40 MG Oral Capsule Delayed Release Take 1 capsule (40 mg total) by mouth 2 (two) times daily before meals. Take 1 capsule by mouth daily before breakfast. 180 capsule 3    prenatal vitamin with DHA 27-0.8-228 MG Oral Cap Take 1 capsule by mouth daily.      famotidine (PEPCID AC) 10 MG Oral Tab Take 1 tablet (10 mg total) by mouth nightly as needed.        Allergies: No Known Allergies    PHYSICAL EXAMINATION:  /77   Pulse 93   Wt 162 lb (73.5 kg)   LMP 06/10/2024 (Exact Date)   BMI 28.70 kg/m²   General: alert and oriented,no acute distress  Abdomen: gravid, soft, non-tender      OBSTETRIC ULTRASOUND  The patient had a level 2 ultrasound today which revealed normal anatomy with size consistent with dates.     Ultrasound Findings:  Single IUP in breech presentation.    Placenta is fundal.   A 3 vessel cord is noted.  Cardiac activity is present at 141 bpm   g ( 0 lb 12 oz);   MVP is 5 cm .     The cervix was not able to be clearly visualized and appeared short by transabdominal ultrasound, therefore transvaginal ultrasound was performed. Transvaginal US findings: cervix was unable to be reliably measured due to uterine contraction (normal to be seen at this time in pregnancy).    The fetal measurements are consistent with the established EDC. No  ultrasound evidence of structural abnormalities are seen today. The nasal bone is present. No ultrasound evidence of markers for aneuploidy are seen. She understands that ultrasound exam cannot exclude genetic abnormalities and that genetic testing is recommended. The limitations of ultrasound were discussed.     Uterus and adnexa appeared normal  today on US    See PACS/Imaging Tab For Complete Ultrasound Report    I interpreted the results and reviewed them with the patient.    DISCUSSION  During her visit we discussed and reviewed the following issues:    ADVANCED MATERNAL AGE    Background  I reviewed with the patient that pregnancies in women of advanced maternal age (35 or older at delivery) are associated with elevated risks.  Specifically, there is a higher rate of:    Fetal malformations  Preeclampsia  Gestational diabetes  Intrauterine fetal death    Please see previous MFM detailed discussion.      The patient has already obtained a low-risk  NPIT result and was appropriately reassured.     SCLERODERMA    Scleroderma may or may not be associated with systemic disease.  Localized scleroderma (morphea or linear scleroderma) is usually limited to the skin of the hands and is often associated with Raynaud phenomenon.  Raynaud's phenomenon is a limited form of scleroderma.  Pregnancy outcomes are very good in women with localized scleroderma.      Systemic scleroderma is a chronic connective tissue disease characterized by fibrosis of the skin and internal organs (GI tract, esophagus, kidneys and lungs) and a progressive obliterative vasculopathy. It is uncommon and occuring in about 10-15 individuals per million per year. There is about 10:1 female to male ratio.  A variety of pulmonary lesions may occur leading to pulmonary hypertension. It may affect the heart resulting in dysrhythmia and hypertension. It may affect the kidneys resulting in renal insufficiency and proteinuria.         The incidence of  scleroderma in pregnancy is unknown but it is uncommon. Limited data seems to suggest an increased risk of spontaneous loss, fetal growth restriction, PIH and premature delivery from pregnancy complications. Limited data seems to suggest the disease remains unchanged in about 1/3 of the cases, worsens in about half of the cases and improves in about 11% of the cases. During pregnancy glucocorticoids may be used to treat any flare.         I discussed the risks of a second trimester bleeding including pregnancy loss, PROM, chronic abruption, IUGR, prematurity, infection and maternal hemorrhage.      Cervix could not be adequately assessed due to uterine contractio.  Plan TV cervical length in 1-2 weeks.    IMPRESSION:  IUP at 20w0d   AMA: low-risk cell free fetal DNA, declined invasive testing  Scleroderma - stable  Second trimester spotting.  Unable to assess cervix due to uterine contraction    RECOMMENDATIONS:  Continue care with Dr. Persaud  Closed surveillance for potential PIH and flares  Monthly growth ultrasounds in the third trimester  Weekly NST by 32 weeks and sooner if the clinical situation dictates    Cervical length in 1-2 weeks    Thank you for allowing me to participate in the care of your patient.  Please do not hesitate to contact me if additional questions or concerns arise.      Re Boo MD      40 minutes spent in review of records, patient consultation, documentation and coordination of care.  The relevant clinical matter(s) are summarized above.     Note to patient and family  The 21st Century Cures Act makes medical notes available to patients in the interest of transparency.  However, please be advised that this is a medical document.  It is intended as [id number] communication.  It is written and medical language may contain abbreviations or verbiage that are technical and unfamiliar.  It may appear blunt or direct.  Medical documents are intended to carry relevant information, facts  as evident, and the clinical opinion of the practitioner.

## 2024-10-28 NOTE — TELEPHONE ENCOUNTER
I further reviewed her length imaging.  She had a uterine contraction which can definitely be seen during the second trimester.  It makes the cervical length unreliable when we measure it.  Therefore I recommended a cervical length in 1 to 2 weeks.  I stressed that it is a normal finding to see a uterine contraction during an ultrasound at 20 weeks.    Re Boo MD

## 2024-10-31 NOTE — PROGRESS NOTES
Outpatient Maternal-Fetal Medicine Follow-Up    Dear Dr. Persaud    Thank you for requesting ultrasound evaluation and maternal fetal medicine consultation on your patient Susie Herr.  As you are aware she is a 39 year old female  with a quezada pregnancy and an Estimated Date of Delivery: 3/17/25.  She returned to maternal-fetal medicine today for a follow-up visit.  Her history was reviewed from her prior visit and there were no interval changes.  SHe had a TV ultrasound to evaluate her cervix last week due to spotting and the cervical length was unable to be  assessed due to uterine contraction.    Antepartum Risk Factors  AMA: low-risk cell free fetal DNA, declined invasive testing   Scleroderma - stable  Second trimester spotting.    PHYSICAL EXAMINATION:  /72   Pulse 102   Wt 162 lb (73.5 kg)   LMP 06/10/2024 (Exact Date)   BMI 28.70 kg/m²   General: alert and oriented, no acute distress  Abdomen: gravid, soft, non-tender  Extremities: non-tender, no edema    OBSTETRIC ULTRASOUND    Ultrasound Findings:  Single IUP in cephalic presentation.    Placenta is  anterior, right.   A 3 vessel cord is noted.  Cardiac activity is present at 166 bpm  MVP is 5 cm  The cervix was not able to be clearly visualized and appeared short by transabdominal ultrasound, therefore transvaginal ultrasound was performed. Transvaginal US findings: Cervix is closed, long and no funneling seen measuring 36.3 mm     See PACS/Imaging Tab For Complete Ultrasound Report  I interpreted the results and reviewed them with the patient.    DISCUSSION  During her visit we discussed and reviewed the following issues:  ADVANCED MATERNAL AGE  See prior MFM notes for a detailed review.  She did not desire invasive genetic testing.   She has already obtained a low-risk NPIT result and was appropriately reassured.     SCLERODERMA  See prior [facility] notes for a detailed review.    IMPRESSION:  IUP at 21w1d  AMA: low-risk cell  free fetal DNA, declined invasive testing   Scleroderma - stable  Second trimester spotting.  Unable to assess cervix due to uterine contraction on 10/28/24 - reassuring today     RECOMMENDATIONS:  Continue care with Dr. Persaud  Close surveillance for potential PIH and flares  Monthly growth ultrasounds in the third trimester  Weekly NST by 32 weeks and sooner if the clinical situation dictates      Thank you for allowing me to participate in the care of your patient.  Please do not hesitate to contact me if additional questions or concerns arise.      Nishant Quevedo M.D.    20 minutes spent in review of records, patient consultation, documentation and coordination of care.  The relevant clinical matter(s) are summarized above.     Note to patient and family  The 21st Century Cures Act makes medical notes available to patients in the interest of transparency.  However, please be advised that this is a medical document.  It is intended as [id number] communication.  It is written and medical language may contain abbreviations or verbiage that are technical and unfamiliar.  It may appear blunt or direct.  Medical documents are intended to carry relevant information, facts as evident, and the clinical opinion of the practitioner.

## 2024-11-05 ENCOUNTER — HOSPITAL ENCOUNTER (OUTPATIENT)
Dept: PERINATAL CARE | Facility: HOSPITAL | Age: 39
Discharge: HOME | End: 2024-11-05
Attending: OBSTETRICS & GYNECOLOGY
Payer: COMMERCIAL

## 2024-11-05 ENCOUNTER — OFFICE VISIT (OUTPATIENT)
Dept: RHEUMATOLOGY | Facility: CLINIC | Age: 39
End: 2024-11-05
Payer: COMMERCIAL

## 2024-11-05 VITALS
BODY MASS INDEX: 29 KG/M2 | HEART RATE: 102 BPM | SYSTOLIC BLOOD PRESSURE: 106 MMHG | DIASTOLIC BLOOD PRESSURE: 72 MMHG | WEIGHT: 162 LBS

## 2024-11-05 VITALS
HEART RATE: 92 BPM | DIASTOLIC BLOOD PRESSURE: 76 MMHG | WEIGHT: 164 LBS | SYSTOLIC BLOOD PRESSURE: 109 MMHG | HEIGHT: 63 IN | BODY MASS INDEX: 29.06 KG/M2

## 2024-11-05 DIAGNOSIS — M34.9 SCLERODERMA (HCC): Primary | ICD-10-CM

## 2024-11-05 DIAGNOSIS — O09.522 MULTIGRAVIDA OF ADVANCED MATERNAL AGE IN SECOND TRIMESTER (HCC): ICD-10-CM

## 2024-11-05 DIAGNOSIS — I73.00 RAYNAUD'S DISEASE WITHOUT GANGRENE: ICD-10-CM

## 2024-11-05 DIAGNOSIS — O09.522 MULTIGRAVIDA OF ADVANCED MATERNAL AGE IN SECOND TRIMESTER (HCC): Primary | ICD-10-CM

## 2024-11-05 DIAGNOSIS — M34.9 SCLERODERMA (HCC): ICD-10-CM

## 2024-11-05 PROCEDURE — 76817 TRANSVAGINAL US OBSTETRIC: CPT | Performed by: OBSTETRICS & GYNECOLOGY

## 2024-11-05 PROCEDURE — 99214 OFFICE O/P EST MOD 30 MIN: CPT | Performed by: INTERNAL MEDICINE

## 2024-11-05 PROCEDURE — 76815 OB US LIMITED FETUS(S): CPT

## 2024-11-05 NOTE — PROGRESS NOTES
Susie Herr is a 39 year old female.    HPI:     Chief Complaint   Patient presents with    Follow - Up       I had the pleasure of seeing Susie Herr on 11/5/2024 for follow up Scleroderma     Current medications:  Omeprazole, Pepcid  Previous medications:  CellCept 1500 mg twice a day for skin symptoms, she was on it from 2018 till 2020  For Raynaud's she tried nifedipine, losartan, aspirin, Nitropaste, pentoxifylline, sildenafil.  They either did not work or she had side effects.  Sildenafil caused arrhythmias  Blood work:  +STEVEN 1:1280, nuclear and nucleolar pattern  Neg JAKOB panel, ESR, CRP, RF  Normal CBC and CMP (4/2024)  PFT 6/2024: normal  HRCT 5/2024: Dilated and patulous esophagus, 0.4 cm nodule in the right lung.  No ILD findings  Echocardiogram 5/2024: normal    Interval History:  This is a 40 yo F with hx of GERD presents to establish care for Scleroderma.  She was diagnosed with scleroderma in 2018 shortly after a pregnancy loss that was attributed to CMV.  She presented with puffy fingers, skin thickening of the arms and hands, GERD, Raynaud's.  She was initially following with rheumatologist at [facility] Dr. Xavier Mueller.  At that time she had significant Raynaud's with sores on her fingers.  She was treated with multiple medications such as nifedipine, losartan, aspirin, Nitropaste, pentoxifylline.  She also was on sildenafil which did help but caused arrhythmia which improved after stopping the medication.  She currently is not taking any medications for her Raynaud's.  In [place] her Raynaud's was overall controlled.  She also was started on CellCept during the initial diagnosis and was on it for about a year.  She feels like it helped with the skin tightening.  She stopped it due to family-planning.  She has 2 boys, a 3-year-old and 17-month-old.  She had a recent miscarriage in March 2024.  She has significant GERD.  She is on omeprazole, Pepcid as needed.  She was  seen by GI and recently started on Carafate.  She does have telangiectasias on her face and neck.  She was considering trying laser treatment to remove some of them.  Denies any shortness of breath or trouble swallowing.  Denies any swelling in her joints.  No rash or psoriasis.     11/5/2024:  Presents for follow-up of scleroderma  She is pregnant 21 weeks pregnant. This is her 3rd pregnancy   Minimal joint pain pain, no sob   Raynaud is not severe  GERD is stable  She was going to get EGD but will get it done after delivery           HISTORY:  Past Medical History:    Chlamydia    Decorative tattoo    Esophagus disorder    Genital herpes simplex    Human papilloma virus infection    Miscarriage (HCC)    2-2024    Raynaud's syndrome    Scleroderma (HCC)      Social Hx Reviewed   Family Hx Reviewed     Medications (Active prior to today's visit):  Current Outpatient Medications   Medication Sig Dispense Refill    Aspirin Effervescent (JANET-SELTZER) 325 MG Oral Effer Tab Take 325 mg by mouth every 6 (six) hours as needed.      Cholecalciferol 50 MCG (2000 UT) Oral Tablet Dispersible Take 1,000 Units by mouth As Directed.      Alum Hydroxide-Mag Trisilicate (GAVISCON) 80-14.2 MG Oral Chew Tab Chew 2 tablets by mouth daily.      Loratadine 10 MG Oral Cap Take 10 mg by mouth as needed.      Omeprazole 40 MG Oral Capsule Delayed Release Take 1 capsule (40 mg total) by mouth 2 (two) times daily before meals. Take 1 capsule by mouth daily before breakfast. 180 capsule 3    prenatal vitamin with DHA 27-0.8-228 MG Oral Cap Take 1 capsule by mouth daily.      famotidine (PEPCID AC) 10 MG Oral Tab Take 1 tablet (10 mg total) by mouth nightly as needed.       .cmed  Allergies:  Allergies[1]      ROS:   All other ROS are negative.     PHYSICAL EXAM:   GEN: AAOx3, NAD  HEENT: EOMI, PERRLA, no injection or icterus, oral mucosa moist, no oral lesions. No lymphadenopathy. No facial rash  CVS: RRR, no murmurs rubs or gallops. Equal  2+ distal pulses.   LUNGS: CTAB, no increased work of breathing  ABDOMEN:  soft NT/ND, +BS, no HSM  SKIN: No rashes or skin lesions. No nail findings  MSK:  Hands overall puffy and thickened but no evidence of sclerodactyly. No skin tightening. Able to make a full fist   NEURO: Cranial nerves II-XII intact grossly. 5/5 strength throughout in both upper and lower extremities, sensation intact.  PSYCH: normal mood       LABS:     Component      Latest Ref Rng 5/17/2024   STEVEN SCREEN, IFA      NEGATIVE  POSITIVE !    DNA (DS) ANTIBODY      IU/mL <1    SCL-70 ANTIBODY      <1.0 NEG AI <1.0 NEG    SM ANTIBODY      <1.0 NEG AI <1.0 NEG    SM/RNP ANTIBODY      <1.0 NEG AI <1.0 NEG    SJOGREN'S ANTIBODY (SS-A)      <1.0 NEG AI <1.0 NEG    SJOGREN'S ANTIBODY (SS-B)      <1.0 NEG AI <1.0 NEG    STEVEN TITER      titer > OR = 1:1280 !    STEVEN PATTERN Nuclear, Nucleolar !    C-REACTIVE PROTEIN      <8.0 mg/L <3.0    SED RATE BY MODIFIED$WESTERGREN      < OR = 20 mm/h 2    RHEUMATOID FACTOR      <14 IU/mL <10      Imaging:     HRCT 5/2024:CONCLUSION:   Dilated and patulous esophagus consistent with patient's history of scleroderma.      Mosaic attenuation of the lungs bilaterally suggestive of air trapping which can be seen with small vessel or small airways disease.      0.4 cm nodule is seen within the right lower lobe. Followup chest CT recommended in 12 months to demonstrate resolution.       No architectural distortion, honeycombing or septal thickening to suggest interstitial lung disease at this point.     Echocardiogram 5/2024:  Conclusions:   Left ventricle: The cavity size was normal. Wall thickness was normal.   Systolic function was normal. The estimated ejection fraction was 60-65%, by   visual assessment. Wall motion is normal; there are no regional wall motion   abnormalities. Left ventricular diastolic function parameters were normal.     ASSESSMENT/PLAN:     Systemic scleroderma, CREST syndrome- stable   - She was  diagnosed in 2018.  Found to have a positive STEVEN nucleolar pattern with symptoms of skin tightening, significant GERD, esophageal dysmotility, Raynaud's, telangiectasias  - She was placed on many medications for her Raynaud's but either did not work or had side effects.  Currently her Raynaud's is stable  - For her acid reflux she is on omeprazole and Pepcid as needed.  Following with GI  - She was previously on CellCept from 2018 till 2020, feels like it helped her skin tightening.  Now off medication and stable  - PFT 6/2024: normal  - Echocardiogram 5/20243: normal  - HRCT 5/2024: No evidence of ILD, esophageal dilatation and esophageal dysmotility noted  - She will stay off immunosuppressants as her symptoms are stable     Pregnant  - following with OB    Pt will f/u in 6 mos    There is a longitudinal care relationship with me, the care plan reflects the ongoing nature of the continuous relationship of care, and the medical record indicates that there is ongoing treatment of a serious/complex medical condition which I am currently managing.  is Applicable.     Analilia Burnett MD  11/5/2024   2:40 PM                 [1] No Known Allergies

## 2024-11-13 ENCOUNTER — ROUTINE PRENATAL (OUTPATIENT)
Dept: OBGYN CLINIC | Facility: CLINIC | Age: 39
End: 2024-11-13
Payer: COMMERCIAL

## 2024-11-13 VITALS
SYSTOLIC BLOOD PRESSURE: 97 MMHG | WEIGHT: 164.19 LBS | HEART RATE: 87 BPM | BODY MASS INDEX: 29 KG/M2 | DIASTOLIC BLOOD PRESSURE: 65 MMHG

## 2024-11-13 DIAGNOSIS — Z34.92 ENCOUNTER FOR SUPERVISION OF NORMAL PREGNANCY IN SECOND TRIMESTER, UNSPECIFIED GRAVIDITY (HCC): Primary | ICD-10-CM

## 2024-11-13 LAB
APPEARANCE: CLEAR
GLUCOSE (URINE DIPSTICK): NEGATIVE MG/DL
KETONES (URINE DIPSTICK): NEGATIVE MG/DL
LEUKOCYTES: NEGATIVE
MULTISTIX LOT#: ABNORMAL NUMERIC
NITRITE, URINE: NEGATIVE
OCCULT BLOOD: NEGATIVE
PH, URINE: 6.5 (ref 4.5–8)
PROTEIN (URINE DIPSTICK): NEGATIVE MG/DL
SPECIFIC GRAVITY: 1.01 (ref 1–1.03)
URINE-COLOR: YELLOW
UROBILINOGEN,SEMI-QN: 0.2 MG/DL (ref 0–1.9)

## 2024-11-13 PROCEDURE — 81002 URINALYSIS NONAUTO W/O SCOPE: CPT | Performed by: OBSTETRICS & GYNECOLOGY

## 2024-11-13 RX ORDER — ASPIRIN 81 MG/1
TABLET ORAL
COMMUNITY
Start: 2024-09-09

## 2024-11-26 ENCOUNTER — HOSPITAL ENCOUNTER (OUTPATIENT)
Facility: HOSPITAL | Age: 39
Discharge: HOME | End: 2024-11-26
Attending: OBSTETRICS & GYNECOLOGY | Admitting: OBSTETRICS & GYNECOLOGY
Payer: COMMERCIAL

## 2024-11-26 ENCOUNTER — TELEPHONE (OUTPATIENT)
Dept: OBGYN CLINIC | Facility: CLINIC | Age: 39
End: 2024-11-26

## 2024-11-26 VITALS
RESPIRATION RATE: 17 BRPM | SYSTOLIC BLOOD PRESSURE: 105 MMHG | DIASTOLIC BLOOD PRESSURE: 66 MMHG | TEMPERATURE: 98 F | HEART RATE: 87 BPM

## 2024-11-26 PROBLEM — O46.92 VAGINAL BLEEDING IN PREGNANCY, SECOND TRIMESTER (HCC): Status: ACTIVE | Noted: 2024-11-26

## 2024-11-26 LAB
BILIRUB UR QL: NEGATIVE
CLARITY UR: CLEAR
GLUCOSE UR-MCNC: NORMAL MG/DL
HGB UR QL STRIP.AUTO: NEGATIVE
KETONES UR-MCNC: NEGATIVE MG/DL
LEUKOCYTE ESTERASE UR QL STRIP.AUTO: NEGATIVE
NITRITE UR QL STRIP.AUTO: NEGATIVE
PH UR: 7.5 [PH] (ref 5–8)
PROT UR-MCNC: NEGATIVE MG/DL
SP GR UR STRIP: 1.01 (ref 1–1.03)
UROBILINOGEN UR STRIP-ACNC: NORMAL

## 2024-11-26 PROCEDURE — 59025 FETAL NON-STRESS TEST: CPT | Performed by: OBSTETRICS & GYNECOLOGY

## 2024-11-26 RX ORDER — ACETAMINOPHEN 500 MG
500 TABLET ORAL EVERY 6 HOURS PRN
Status: DISCONTINUED | OUTPATIENT
Start: 2024-11-26 | End: 2024-11-26

## 2024-11-26 NOTE — TELEPHONE ENCOUNTER
Patient told by Dr. Arteaga to have a follow up appointment during next week.  Patient seen by Jenni in L&D today.    Pls call patient

## 2024-11-26 NOTE — PROGRESS NOTES
Pt is a 39 year old female admitted to TR1/TR1-A.     Chief Complaint   Patient presents with    Vaginal Bleeding     Pt states she noticed Spotting last night and has had lower back pain and cramping pain. Pt states she is feeling baby moving.      Pt is  24w1d intra-uterine pregnancy.  History obtained, consents signed. Oriented to room, staff, and plan of care.

## 2024-11-26 NOTE — TELEPHONE ENCOUNTER
Pt 24w1d calling to report she has been experiencing pelvic cramping, back pain, and some vaginal spotting that started last night. Pt stated that around 8pm she had cramping and then a few drops of red blood in the toilet. Pt also reported pink tinged blood on toilet paper with wiping. Pt stated that this morning the spotting is now brown in color. Pt states that the back pain is consistent but the pelvic cramping is coming and going and rates it as 4/10. Pt denies recent IC or straining with BMs. Pt denies any UTI symptoms. Reports normal BM this morning.    Spoke with KEYONAK (on call) who would like for pt to go to [facility] for eval. Pt informed and will go to [facility] now. Paola from [facility] notified.      Noticed on pts MFM US on  there was a note that stated \"cervical cerclage absent\". Pt stated she has never had a cerclage in place and denies hx of  delivery. Pt stated at 37 weeks with her last pregnancy she was induced for spotting.

## 2024-11-26 NOTE — TELEPHONE ENCOUNTER
Patient called to speak with nurse regarding symptoms at 24 weeks. She is having lower abdominal and back pain. Spotting that started at 8 pm with pink and trickling of blood in toilet and pink tinge on pad this morning but turning brown in color. Still having cramping. Please call.

## 2024-11-26 NOTE — TRIAGE
[facility]  part of [facility]      Triage Note    Susie Herr Patient Status:  Outpatient    1985 MRN [id number]   [facility] Attending Sandra Arteaga MD   Hosp Day # 0 PCP No primary care provider on file.      Para:   Estimated Date of Delivery: 3/17/25  Gestation: 24w1d    Chief Complaint    Vaginal Bleeding         Allergies:  Allergies[1]    Orders Placed This Encounter   Procedures    Urinalysis with Culture Reflex       Lab Results   Component Value Date    WBC 6.8 2024    HGB 14.2 2024    HCT 43.9 2024     2024    CREATSERUM 0.76 2024    BUN 12 2024     2024    K 4.5 2024     2024    CO2 28 2024    GLU 86 2024    CA 9.1 2024    ALB 4.5 2024    ALKPHO 46 2024    BILT 0.7 2024    TP 6.8 2024    AST 12 2024    ALT 11 2024    RPR 1:2 (H) 2024       Clinitek UA  Lab Results   Component Value Date    GLUUR Normal 2024    SPECGRAVITY 1.007 2024       UA  Lab Results   Component Value Date    COLORUR Light-Yellow 2024    CLARITY Clear 2024    SPECGRAVITY 1.007 2024    PROUR Negative 2024    GLUUR Normal 2024    KETUR Negative 2024    BILUR Negative 2024    BLOODURINE Negative 2024    NITRITE Negative 2024    UROBILINOGEN Normal 2024    LEUUR Negative 2024       Vitals:    24 1035   BP: 105/66   BP Location: Left arm   Pulse: 87   Resp: 17   Temp: 98.2 °F (36.8 °C)   TempSrc: Oral       NST  Variability: Moderate           Accelerations: Yes           Decelerations: None            Baseline: 130 BPM           Uterine Irritability: No           Contractions: Not present                                        Acoustic Stimulator: No           Nonstress Test Interpretation: Appropriate for gestational age            Nonstress Test Second Interpretation: Reactive          FHR Category: Category I             Additional Comments Comments: Pt is a  at 24.1 here with complaint of spotting yesterday and brownish vaginal discharge this morning. States she has been having constant back pain and lower abdominal cramping. Sterile speculum exam done Per Dr. Arteaga. No active bleeding noted only blood tinged white vaginal discharge. SVE 0/0/-4. EFM tracing appropriate. Pt declines offer for Tylenol. UA results normal. Dr. Arteaga informed of findings. Ok to discharge pt home with rest and follow up next week. Pt provided with discharge instructions and labor precautions. Pt verbalizes understanding.     Chief Complaint   Patient presents with    Vaginal Bleeding     Pt states she noticed Spotting last night and has had lower back pain and cramping pain. Pt states she is feeling baby moving.         Paola CHAUDHRY RN  2024 12:28 PM      Diagnosis:  vaginal spotting in 2nd trimester  I have reviewed the NST and agree with the above findings and recommendations.   Sandra Arteaga MD    2024    2:07 PM        [1] No Known Allergies

## 2024-12-03 ENCOUNTER — ROUTINE PRENATAL (OUTPATIENT)
Dept: OBGYN CLINIC | Facility: CLINIC | Age: 39
End: 2024-12-03
Payer: COMMERCIAL

## 2024-12-03 VITALS
DIASTOLIC BLOOD PRESSURE: 69 MMHG | WEIGHT: 162.63 LBS | BODY MASS INDEX: 29 KG/M2 | HEART RATE: 109 BPM | SYSTOLIC BLOOD PRESSURE: 104 MMHG

## 2024-12-03 DIAGNOSIS — Z34.91 ENCOUNTER FOR SUPERVISION OF NORMAL PREGNANCY IN FIRST TRIMESTER, UNSPECIFIED GRAVIDITY (HCC): Primary | ICD-10-CM

## 2024-12-03 LAB
APPEARANCE: CLEAR
BILIRUBIN: NEGATIVE
GLUCOSE (URINE DIPSTICK): NEGATIVE MG/DL
KETONES (URINE DIPSTICK): NEGATIVE MG/DL
LEUKOCYTES: NEGATIVE
MULTISTIX LOT#: NORMAL NUMERIC
NITRITE, URINE: NEGATIVE
OCCULT BLOOD: NEGATIVE
PH, URINE: 7 (ref 4.5–8)
PROTEIN (URINE DIPSTICK): NEGATIVE MG/DL
SPECIFIC GRAVITY: 1.01 (ref 1–1.03)
URINE-COLOR: YELLOW
UROBILINOGEN,SEMI-QN: 0.2 MG/DL (ref 0–1.9)

## 2024-12-03 PROCEDURE — 81002 URINALYSIS NONAUTO W/O SCOPE: CPT | Performed by: OBSTETRICS & GYNECOLOGY

## 2024-12-03 NOTE — PROGRESS NOTES
Problem visit.  Seen in triage last week due to spotting and cramping.  No further cramping after trying to take it easy.   Had spotting after BM with wipe only.  None since.   Will decrease exercise intensity.  PTL precautions.  Did 2T labs at [facility]

## 2024-12-04 LAB
ALBUMIN/GLOBULIN RATIO: 1.3 (CALC) (ref 1–2.5)
ALBUMIN: 3.3 G/DL (ref 3.6–5.1)
ALKALINE PHOSPHATASE: 57 U/L (ref 31–125)
ALT: 11 U/L (ref 6–29)
AST: 14 U/L (ref 10–30)
BILIRUBIN, TOTAL: 0.3 MG/DL (ref 0.2–1.2)
BUN: 12 MG/DL (ref 7–25)
CALCIUM: 8.2 MG/DL (ref 8.6–10.2)
CARBON DIOXIDE: 22 MMOL/L (ref 20–32)
CHLORIDE: 106 MMOL/L (ref 98–110)
CREATININE: 0.59 MG/DL (ref 0.5–0.97)
EGFR: 117 ML/MIN/1.73M2
GLOBULIN: 2.6 G/DL (CALC) (ref 1.9–3.7)
GLUCOSE, GESTATIONAL SCREEN (50G)-130 CUTOFF: 118 MG/DL
GLUCOSE: 122 MG/DL (ref 65–99)
HEMATOCRIT: 37.6 % (ref 35–45)
HEMOGLOBIN: 12 G/DL (ref 11.7–15.5)
MCH: 27.8 PG (ref 27–33)
MCHC: 31.9 G/DL (ref 32–36)
MCV: 87 FL (ref 80–100)
MPV: 10.1 FL (ref 7.5–12.5)
PLATELET COUNT: 226 THOUSAND/UL (ref 140–400)
POTASSIUM: 4 MMOL/L (ref 3.5–5.3)
PROTEIN, TOTAL: 5.9 G/DL (ref 6.1–8.1)
RDW: 12.2 % (ref 11–15)
RED BLOOD CELL COUNT: 4.32 MILLION/UL (ref 3.8–5.1)
SODIUM: 135 MMOL/L (ref 135–146)
WHITE BLOOD CELL COUNT: 8.9 THOUSAND/UL (ref 3.8–10.8)

## 2024-12-23 ENCOUNTER — ROUTINE PRENATAL (OUTPATIENT)
Dept: OBGYN CLINIC | Facility: CLINIC | Age: 39
End: 2024-12-23
Payer: COMMERCIAL

## 2024-12-23 ENCOUNTER — HOSPITAL ENCOUNTER (OUTPATIENT)
Dept: PERINATAL CARE | Facility: HOSPITAL | Age: 39
End: 2024-12-23
Attending: OBSTETRICS & GYNECOLOGY
Payer: COMMERCIAL

## 2024-12-23 ENCOUNTER — HOSPITAL ENCOUNTER (OUTPATIENT)
Dept: PERINATAL CARE | Facility: HOSPITAL | Age: 39
Discharge: HOME | End: 2024-12-23
Attending: OBSTETRICS & GYNECOLOGY
Payer: COMMERCIAL

## 2024-12-23 VITALS
DIASTOLIC BLOOD PRESSURE: 65 MMHG | HEART RATE: 85 BPM | SYSTOLIC BLOOD PRESSURE: 102 MMHG | WEIGHT: 166.63 LBS | BODY MASS INDEX: 30 KG/M2

## 2024-12-23 VITALS
DIASTOLIC BLOOD PRESSURE: 65 MMHG | WEIGHT: 167 LBS | SYSTOLIC BLOOD PRESSURE: 97 MMHG | HEART RATE: 93 BPM | BODY MASS INDEX: 30 KG/M2

## 2024-12-23 DIAGNOSIS — Z34.91 ENCOUNTER FOR SUPERVISION OF NORMAL PREGNANCY IN FIRST TRIMESTER, UNSPECIFIED GRAVIDITY (HCC): Primary | ICD-10-CM

## 2024-12-23 DIAGNOSIS — O09.522 MULTIGRAVIDA OF ADVANCED MATERNAL AGE IN SECOND TRIMESTER (HCC): ICD-10-CM

## 2024-12-23 DIAGNOSIS — O09.523 MULTIGRAVIDA OF ADVANCED MATERNAL AGE IN THIRD TRIMESTER (HCC): ICD-10-CM

## 2024-12-23 DIAGNOSIS — M34.9 SCLERODERMA (HCC): ICD-10-CM

## 2024-12-23 DIAGNOSIS — O09.523 MULTIGRAVIDA OF ADVANCED MATERNAL AGE IN THIRD TRIMESTER (HCC): Primary | ICD-10-CM

## 2024-12-23 LAB
APPEARANCE: CLEAR
BILIRUBIN: NEGATIVE
GLUCOSE (URINE DIPSTICK): NEGATIVE MG/DL
KETONES (URINE DIPSTICK): NEGATIVE MG/DL
LEUKOCYTES: NEGATIVE
MULTISTIX LOT#: NORMAL NUMERIC
NITRITE, URINE: NEGATIVE
OCCULT BLOOD: NEGATIVE
PH, URINE: 6.5 (ref 4.5–8)
PROTEIN (URINE DIPSTICK): NEGATIVE MG/DL
SPECIFIC GRAVITY: 1 (ref 1–1.03)
URINE-COLOR: YELLOW
UROBILINOGEN,SEMI-QN: 0.2 MG/DL (ref 0–1.9)

## 2024-12-23 PROCEDURE — 76816 OB US FOLLOW-UP PER FETUS: CPT | Performed by: OBSTETRICS & GYNECOLOGY

## 2024-12-23 PROCEDURE — 81002 URINALYSIS NONAUTO W/O SCOPE: CPT | Performed by: OBSTETRICS & GYNECOLOGY

## 2024-12-23 NOTE — PROGRESS NOTES
Outpatient Maternal-Fetal Medicine Follow-Up    Dear Dr. Persaud    Thank you for requesting ultrasound evaluation and maternal fetal medicine consultation on your patient Susie Herr.  As you are aware she is a 39 year old female  with a quezada pregnancy and an Estimated Date of Delivery: 3/17/25.  She returned to maternal-fetal medicine today for a follow-up visit.  Her history was reviewed from her prior visit and there were no interval changes.      Feeling good.  Pregnancy going well thus far.  Antepartum Risk Factors  AMA: low-risk cell free fetal DNA, declined invasive testing   Scleroderma - stable  Second trimester spotting.    OB History    Para Term  AB Living   7 2 2   4 2   SAB IAB Ectopic Multiple Live Births   3 1     2      # Outcome Date GA Lbr Tin/2nd Weight Sex Type Anes PTL Lv   7 Current            6 SAB 24 7w3d    SAB None     5 Term 11/15/22 37w0d  6 lb 1 oz (2.75 kg) M NORMAL SPONT EPI Y BERTHA      Birth Comments: Bleeding issue in pregnancy d/t placenta previa, that resolved   4 SAB 2022 6w0d    SAB None     3 Term 21 40w3d  6 lb 10 oz (3.005 kg) M NORMAL SPONT EPI N BERTHA      Birth Comments: Forcep delivery      Complications: Temp 100.4 or greater, Intraamniotic Infection, Meconium   2 IAB 2018 24w0d   M THERAPEUTIC Gen        Birth Comments: Had CMV   1 2017 10w0d    SAB Gen        Birth Comments: D&C       PHYSICAL EXAMINATION:  BP 97/65   Pulse 93   Wt 167 lb (75.8 kg)   LMP 06/10/2024 (Exact Date)   BMI 29.58 kg/m²   General: alert and oriented, no acute distress  Abdomen: gravid, soft, non-tender      OBSTETRIC ULTRASOUND    Ultrasound Findings:  Single IUP in breech presentation.    Placenta is anterior, right.   A 3 vessel cord is noted.  Cardiac activity is present at 148 bpm  EFW 1178 g ( 2 lb 10 oz); 53%.    MVP is 5.2 cm . MAX 15.9 cm    The fetal measurements are consistent with established EDC. No gross ultrasound  evidence of structural abnormalities are seen today. The patient understands that ultrasound cannot rule out all structural and chromosomal abnormalities.     See PACS/Imaging Tab For Complete Ultrasound Report  I interpreted the results and reviewed them with the patient.    DISCUSSION  During her visit we discussed and reviewed the following issues:  ADVANCED MATERNAL AGE  See prior MFM notes for a detailed review.  She did not desire invasive genetic testing.   She has already obtained a low-risk NPIT result and was appropriately reassured.     SCLERODERMA  See prior MFM notes for a detailed review.    GLUCOSE 1HR OB   Date Value Ref Range Status   12/03/2024 118 <135 mg/dL Final   08/13/2024 98 <135 mg/dL Final      IMPRESSION:  IUP at 28w0d   AMA: low-risk cell free fetal DNA, declined invasive testing   Scleroderma - stable  Second trimester spotting.       RECOMMENDATIONS:  Continue care with Dr. Persaud  Close surveillance for potential PIH and flares  Monthly growth ultrasounds in the third trimester  Weekly NST by 32 weeks and sooner if the clinical situation dictates      Thank you for allowing me to participate in the care of your patient.  Please do not hesitate to contact me if additional questions or concerns arise.      Re Boo MD     20 minutes spent in review of records, patient consultation, documentation and coordination of care.  The relevant clinical matter(s) are summarized above.     Note to patient and family  The 21st Century Cures Act makes medical notes available to patients in the interest of transparency.  However, please be advised that this is a medical document.  It is intended as bbkd-ba-opse communication.  It is written and medical language may contain abbreviations or verbiage that are technical and unfamiliar.  It may appear blunt or direct.  Medical documents are intended to carry relevant information, facts as evident, and the clinical opinion of the practitioner.

## 2025-01-03 ENCOUNTER — OFFICE VISIT (OUTPATIENT)
Dept: FAMILY MEDICINE CLINIC | Facility: CLINIC | Age: 40
End: 2025-01-03
Payer: COMMERCIAL

## 2025-01-03 ENCOUNTER — NURSE ONLY (OUTPATIENT)
Dept: LAB | Age: 40
End: 2025-01-03
Attending: NURSE PRACTITIONER
Payer: COMMERCIAL

## 2025-01-03 VITALS
SYSTOLIC BLOOD PRESSURE: 117 MMHG | BODY MASS INDEX: 30.59 KG/M2 | DIASTOLIC BLOOD PRESSURE: 66 MMHG | RESPIRATION RATE: 22 BRPM | HEART RATE: 92 BPM | HEIGHT: 63 IN | OXYGEN SATURATION: 98 % | WEIGHT: 172.63 LBS | TEMPERATURE: 98 F

## 2025-01-03 DIAGNOSIS — Z34.93 THIRD TRIMESTER PREGNANCY (HCC): ICD-10-CM

## 2025-01-03 DIAGNOSIS — J06.9 VIRAL URI: ICD-10-CM

## 2025-01-03 DIAGNOSIS — J06.9 VIRAL URI WITH COUGH: Primary | ICD-10-CM

## 2025-01-03 DIAGNOSIS — Z20.822 EXPOSURE TO COVID-19 VIRUS: ICD-10-CM

## 2025-01-03 LAB
OPERATOR ID: NORMAL
POCT INFLUENZA A: NEGATIVE
POCT INFLUENZA B: NEGATIVE
RAPID SARS-COV-2 BY PCR: NOT DETECTED

## 2025-01-03 PROCEDURE — 99203 OFFICE O/P NEW LOW 30 MIN: CPT | Performed by: NURSE PRACTITIONER

## 2025-01-03 PROCEDURE — U0002 COVID-19 LAB TEST NON-CDC: HCPCS | Performed by: NURSE PRACTITIONER

## 2025-01-03 PROCEDURE — 87502 INFLUENZA DNA AMP PROBE: CPT

## 2025-01-03 RX ORDER — ALBUTEROL SULFATE 90 UG/1
2 INHALANT RESPIRATORY (INHALATION)
Qty: 1 EACH | Refills: 0 | Status: SHIPPED | OUTPATIENT
Start: 2025-01-03

## 2025-01-03 NOTE — PATIENT INSTRUCTIONS
Instruction for viral upper respiratory infections:  You have a viral upper respiratory illness (URI), which is another term for the common cold. The virus is contagious during the first 4-5 days. It is spread through the air by coughing, sneezing, or by direct contact (touching your sick child then touching your own eyes, nose, or mouth). Sore throat is a common accompanying symptom. Frequent handwashing will decrease risk of spread. Most viral illnesses resolve within 7 to 14 days with rest and simple home remedies. However, they may sometimes last up to 4 weeks. Expect the cough to gradually worsen the first 4-5 days, then peak and slowly go away. The nasal mucous can become thick, yellow or yellow/green during the last half of the cold (but should not last past day 14 of the cold). Antibiotics will not kill a virus and are not prescribed for this condition.    General comfort measures:  Get rest!  Hydrate! (cold or hot based on comfort). Drink lots of water or other non dehydrating liquids to help with illness. Salty foods, soups and tea can help with throat pain.   Hand washing-use hand  or wash hands frequently, cover your cough or sneeze, do not share towels or drinks with others.  Salt water gargles (1 tsp. Salt in 6 oz lukewarm water): gargle for 2 minutes, repeat every 15 minutes as needed to help decrease swelling and relieve pain.  Use humidified air, steamy showers/baths and use vaporizer in sleeping quarters to keep secretions thin.  Avoid smoking.    Symptom management:    Nasal congestion/Post-nasal-drip: Saline nasal spray to nostrils to help remove drainage or an antihistamine to help dry up drainage.    Pain/discomfort:  May use Tylenol only.  Cough:  May take DM-dextromethorophan over the counter (short acting lasting).    Follow up with your PCP in 1 week if not better.  Follow up in a few days if worsening symptoms. Seek immediate care if inability to swallow or breathe.

## 2025-01-03 NOTE — PROGRESS NOTES
CHIEF COMPLAINT:     Chief Complaint   Patient presents with    Cough     X2days Headache, congestion, chest tightness, fatigue - Entered by patient         HPI:   Susie Herr is a 39 year old pregnant female who presents for upper respiratory symptoms for  2 days. Patient reports congestion, dry cough.  Associated symptoms include headache, fatigue, body aches, sinus pressure, chest tightness, feels slightly winded going up stairs (but may be related to pregnancy), slight wheezing but clears with coughing.  Denies fever, chills, n/v/d.  Symptoms have been mild since onset.  Treating symptoms with tylenol, last dose 1030pm.   3 y/o had rsv 2 wks ago and 3 y/o has an int cough now.    + hx of COVID 6/2022; + COVID exposure - xmas  No recent travel.  + large gatherings on xmas ~ 17 people.  Parent was dx with covid 2 days after xmas.  Flu shot: yes      Other conditions:   BMI: Body mass index is 30.57 kg/m².  Pregnant:Yes 29 4/7 weeks  Breastfeeding:  Heart Disease: no  DM: no  Chronic Lung Disease: no  Chronic Kidney Disease: no  Liver Disease: no  Immunocompromised: (Transplant, Heme-Onc, Chronic Steroid Use): scleroderma    Current Outpatient Medications   Medication Sig Dispense Refill    aspirin 81 MG Oral Tab EC       Aspirin Effervescent (JANET-SELTZER) 325 MG Oral Effer Tab Take 325 mg by mouth every 6 (six) hours as needed.      Cholecalciferol 50 MCG (2000 UT) Oral Tablet Dispersible Take 1,000 Units by mouth As Directed.      Alum Hydroxide-Mag Trisilicate (GAVISCON) 80-14.2 MG Oral Chew Tab Chew 2 tablets by mouth daily.      Loratadine 10 MG Oral Cap Take 10 mg by mouth as needed.      Omeprazole 40 MG Oral Capsule Delayed Release Take 1 capsule (40 mg total) by mouth 2 (two) times daily before meals. Take 1 capsule by mouth daily before breakfast. 180 capsule 3    prenatal vitamin with DHA 27-0.8-228 MG Oral Cap Take 1 capsule by mouth daily.      famotidine (PEPCID AC) 10 MG Oral Tab Take 1  tablet (10 mg total) by mouth nightly as needed.        Past Medical History:    Chlamydia    Decorative tattoo    Esophagus disorder    Genital herpes simplex    Human papilloma virus infection    Miscarriage (HCC)    2-2024    Raynaud's syndrome    Scleroderma (HCC)      Past Surgical History:   Procedure Laterality Date    D & c      Egd      Other accessory      ex lap for bowel injury after appendectomy    Other surgical history  2006    apendoctomy         Social History     Socioeconomic History    Marital status:    Tobacco Use    Smoking status: Never    Smokeless tobacco: Never   Vaping Use    Vaping status: Never Used   Substance and Sexual Activity    Alcohol use: Not Currently     Alcohol/week: 2.0 standard drinks of alcohol     Types: 2 Glasses of wine per week     Comment: occasional, prior to pregnancy    Drug use: Never    Sexual activity: Yes     Social Drivers of Health     Financial Resource Strain: Low Risk  (8/19/2024)    Financial Resource Strain     Difficulty of Paying Living Expenses: Not hard at all     Med Affordability: No   Food Insecurity: No Food Insecurity (8/19/2024)    Food Insecurity     Food Insecurity: Never true   Transportation Needs: No Transportation Needs (8/19/2024)    Transportation Needs     Lack of Transportation: No   Stress: No Stress Concern Present (8/19/2024)    Stress     Feeling of Stress : No   Housing Stability: Low Risk  (8/19/2024)    Housing Stability     Housing Instability: No         REVIEW OF SYSTEMS:   GENERAL: feels well otherwise, adequate appetite  SKIN: no rashes or abnormal skin lesions  HEENT: See HPI  LUNGS:  See HPI  CARDIOVASCULAR: denies chest pain or palpitations   GI: denies N/V/C or abdominal pain  NEURO: denies dizziness or lightheadedness    EXAM:   /66   Pulse 92   Temp 98.1 °F (36.7 °C) (Oral)   Resp 22   Ht 5' 3\" (1.6 m)   Wt 172 lb 9.6 oz (78.3 kg)   LMP 06/10/2024 (Exact Date)   SpO2 98%   BMI 30.57 kg/m²      Physical Exam  Vitals reviewed.   Constitutional:       General: She is not in acute distress.     Appearance: Normal appearance. She is not ill-appearing.   HENT:      Head: Normocephalic and atraumatic.      Right Ear: Tympanic membrane and ear canal normal.      Left Ear: Tympanic membrane and ear canal normal.      Nose: Congestion and rhinorrhea present. Rhinorrhea is clear.      Right Sinus: No maxillary sinus tenderness or frontal sinus tenderness.      Left Sinus: No maxillary sinus tenderness or frontal sinus tenderness.      Mouth/Throat:      Lips: Pink.      Mouth: Mucous membranes are moist.      Pharynx: Oropharynx is clear. Uvula midline.   Eyes:      Extraocular Movements: Extraocular movements intact.      Conjunctiva/sclera: Conjunctivae normal.   Cardiovascular:      Rate and Rhythm: Normal rate and regular rhythm.      Heart sounds: Normal heart sounds. No murmur heard.  Pulmonary:      Effort: Pulmonary effort is normal.      Breath sounds: Normal breath sounds and air entry.   Abdominal:      Comments: gravid   Musculoskeletal:      Cervical back: Normal range of motion and neck supple.   Lymphadenopathy:      Cervical: No cervical adenopathy.   Skin:     General: Skin is warm and dry.   Neurological:      General: No focal deficit present.      Mental Status: She is alert.   Psychiatric:         Speech: Speech normal.         Behavior: Behavior normal. Behavior is cooperative.          Recent Results (from the past 24 hours)   POCT Flu Test (Drive Thru Order or [facility])    Collection Time: 01/03/25 11:36 AM    Specimen: Nares; Other   Result Value Ref Range    POCT INFLUENZA A Negative Negative    POCT INFLUENZA B Negative Negative   Rapid Covid-19    Collection Time: 01/03/25  4:52 PM    Specimen: Nares   Result Value Ref Range    Rapid SARS-CoV-2 by PCR Not Detected Not Detected    POCT Lot Number [id number]     POCT Expiration Date 7/21/26     POCT Procedure Control Control  Valid Control Valid     ,765        ASSESSMENT AND PLAN:   Susie Herr is a 39 year old female who presents with     ASSESSMENT:   Encounter Diagnoses   Name Primary?    Viral URI with cough Yes    Third trimester pregnancy (HCC)     Exposure to COVID-19 virus        PLAN:   Rapid flu and covid negative.  Discussed likely other viral etiology.   Reviewed symptom relief measures with patient.   Monitor for worsening symptoms.  Comfort care as described in Patient Instructions  Medications as below.      Meds & Refills for this Visit:  Requested Prescriptions     Signed Prescriptions Disp Refills    albuterol 108 (90 Base) MCG/ACT Inhalation Aero Soln 1 each 0     Sig: Inhale 2 puffs into the lungs every 4 to 6 hours as needed for Wheezing.       Risks, benefits, and side effects of medication explained and discussed.  To f/u with PCP if sx do not resolve as anticipated.  The patient indicates understanding of these issues and agrees to the plan.        Patient Instructions     Instruction for viral upper respiratory infections:  You have a viral upper respiratory illness (URI), which is another term for the common cold. The virus is contagious during the first 4-5 days. It is spread through the air by coughing, sneezing, or by direct contact (touching your sick child then touching your own eyes, nose, or mouth). Sore throat is a common accompanying symptom. Frequent handwashing will decrease risk of spread. Most viral illnesses resolve within 7 to 14 days with rest and simple home remedies. However, they may sometimes last up to 4 weeks. Expect the cough to gradually worsen the first 4-5 days, then peak and slowly go away. The nasal mucous can become thick, yellow or yellow/green during the last half of the cold (but should not last past day 14 of the cold). Antibiotics will not kill a virus and are not prescribed for this condition.    General comfort measures:  Get rest!  Hydrate! (cold  or hot based on comfort). Drink lots of water or other non dehydrating liquids to help with illness. Salty foods, soups and tea can help with throat pain.   Hand washing-use hand  or wash hands frequently, cover your cough or sneeze, do not share towels or drinks with others.  Salt water gargles (1 tsp. Salt in 6 oz lukewarm water): gargle for 2 minutes, repeat every 15 minutes as needed to help decrease swelling and relieve pain.  Use humidified air, steamy showers/baths and use vaporizer in sleeping quarters to keep secretions thin.  Avoid smoking.    Symptom management:    Nasal congestion/Post-nasal-drip: Saline nasal spray to nostrils to help remove drainage or an antihistamine to help dry up drainage.    Pain/discomfort:  May use Tylenol only.  Cough:  May take DM-dextromethorophan over the counter (short acting lasting).    Follow up with your PCP in 1 week if not better.  Follow up in a few days if worsening symptoms. Seek immediate care if inability to swallow or breathe.

## 2025-01-06 ENCOUNTER — ROUTINE PRENATAL (OUTPATIENT)
Dept: OBGYN CLINIC | Facility: CLINIC | Age: 40
End: 2025-01-06
Payer: COMMERCIAL

## 2025-01-06 ENCOUNTER — HOSPITAL ENCOUNTER (OUTPATIENT)
Facility: HOSPITAL | Age: 40
Discharge: HOME | End: 2025-01-06
Attending: OBSTETRICS & GYNECOLOGY | Admitting: OBSTETRICS & GYNECOLOGY
Payer: COMMERCIAL

## 2025-01-06 VITALS
DIASTOLIC BLOOD PRESSURE: 72 MMHG | BODY MASS INDEX: 30 KG/M2 | HEART RATE: 97 BPM | WEIGHT: 169 LBS | SYSTOLIC BLOOD PRESSURE: 112 MMHG

## 2025-01-06 VITALS
RESPIRATION RATE: 16 BRPM | DIASTOLIC BLOOD PRESSURE: 66 MMHG | SYSTOLIC BLOOD PRESSURE: 109 MMHG | HEART RATE: 104 BPM | TEMPERATURE: 98 F

## 2025-01-06 DIAGNOSIS — Z34.83 ENCOUNTER FOR SUPERVISION OF OTHER NORMAL PREGNANCY IN THIRD TRIMESTER (HCC): Primary | ICD-10-CM

## 2025-01-06 LAB
APPEARANCE: CLEAR
BILIRUB UR QL: NEGATIVE
CLARITY UR: CLEAR
COLOR UR: COLORLESS
GLUCOSE (URINE DIPSTICK): NEGATIVE MG/DL
GLUCOSE UR-MCNC: NORMAL MG/DL
HGB UR QL STRIP.AUTO: NEGATIVE
KETONES (URINE DIPSTICK): NEGATIVE MG/DL
KETONES UR-MCNC: NEGATIVE MG/DL
LEUKOCYTE ESTERASE UR QL STRIP.AUTO: NEGATIVE
MULTISTIX LOT#: NORMAL NUMERIC
NITRITE UR QL STRIP.AUTO: NEGATIVE
NITRITE, URINE: NEGATIVE
PH UR: 6.5 [PH] (ref 5–8)
PROT UR-MCNC: NEGATIVE MG/DL
PROTEIN (URINE DIPSTICK): NEGATIVE MG/DL
SP GR UR STRIP: <1.005 (ref 1–1.03)
URINE-COLOR: YELLOW
UROBILINOGEN UR STRIP-ACNC: NORMAL

## 2025-01-06 PROCEDURE — 99213 OFFICE O/P EST LOW 20 MIN: CPT

## 2025-01-06 PROCEDURE — 81003 URINALYSIS AUTO W/O SCOPE: CPT | Performed by: OBSTETRICS & GYNECOLOGY

## 2025-01-06 PROCEDURE — 96372 THER/PROPH/DIAG INJ SC/IM: CPT

## 2025-01-06 PROCEDURE — 59025 FETAL NON-STRESS TEST: CPT

## 2025-01-06 RX ORDER — BETAMETHASONE SODIUM PHOSPHATE AND BETAMETHASONE ACETATE 3; 3 MG/ML; MG/ML
12 INJECTION, SUSPENSION INTRA-ARTICULAR; INTRALESIONAL; INTRAMUSCULAR; SOFT TISSUE EVERY 24 HOURS
Status: DISCONTINUED | OUTPATIENT
Start: 2025-01-06 | End: 2025-01-06

## 2025-01-06 NOTE — PROGRESS NOTES
Pt is a 39 year old female admitted to TR3/TR3-A.     Chief Complaint   Patient presents with    R/o  Labor     Sent in from office-- examined by Dr. Choi and was called       Pt is  30w0d intra-uterine pregnancy.  History obtained, consents signed. Oriented to room, staff, and plan of care.

## 2025-01-06 NOTE — PROGRESS NOTES
Feeling cramping all night long. Cervix ftp. Last vaginal bleeding during royce after straining. [facility]  Flu vaccine -- received  TDAP -- wishes at next visit  RSV -- needs to investigate coverage    Please call your insurance about RSV vaccine coverage called [employer]. You are eligible to receive if you are 32 weeks 0 days until 36 weeks 6 days during Sept 1st-Jan 31st. A fee waiver for $683 will be required to be signed even if your insurance states it will cover. Newborns may also receive the vaccine (Beyfortus) after delivery however pricing is estimated to be higher ($1200).

## 2025-01-06 NOTE — TRIAGE
[facility]  part of [facility]      Triage Note    Susie Herr Patient Status:  Outpatient    1985 MRN [id number]   Location [facility] Attending Dmitriy Ledezma DO   Hosp Day # 0 PCP No primary care provider on file.      Para:   Estimated Date of Delivery: 3/17/25  Gestation: 30w0d    Chief Complaint    R/o  Labor         Allergies:  Allergies[1]    Orders Placed This Encounter   Procedures    Urinalysis with Culture Reflex       Lab Results   Component Value Date    WBC 8.9 2024    HGB 12.0 2024    HCT 37.6 2024     2024    CREATSERUM 0.59 2024    BUN 12 2024     2024    K 4.0 2024     2024    CO2 22 2024     (H) 2024    CA 8.2 (L) 2024    ALB 3.3 (L) 2024    ALKPHO 57 2024    BILT 0.3 2024    TP 5.9 (L) 2024    AST 14 2024    ALT 11 2024    RPR 1:2 (H) 2024       Clinitek UA  Lab Results   Component Value Date    GLUUR Normal 2025    SPECGRAVITY <1.005 (L) 2025       UA  Lab Results   Component Value Date    COLORUR Colorless (A) 2025    CLARITY Clear 2025    SPECGRAVITY <1.005 (L) 2025    PROUR Negative 2025    GLUUR Normal 2025    KETUR Negative 2025    BILUR Negative 2025    BLOODURINE Negative 2025    NITRITE Negative 2025    UROBILINOGEN Normal 2025    LEUUR Negative 2025       Vitals:    25 1445   BP: 109/66   BP Location: Left arm   Pulse: 104   Resp: 16   Temp: 98 °F (36.7 °C)   TempSrc: Oral       NST  Variability: Moderate           Accelerations: Yes           Decelerations: None            Baseline: 135 BPM           Uterine Irritability: No           Contractions: Irregular                    Contraction Frequency: x1                   Acoustic Stimulator: No           Nonstress Test  Interpretation: Appropriate for gestational age           Nonstress Test Second Interpretation: Appropriate for gestational age          FHR Category: Category I             Additional Comments Comments: pt sent in from office for rule out  labor. Per MD order place on monitor to see how often ctx and send UA. UA WNL. Tracing appropriate for gestational age. Dr. Ledezma ordered Betamethasone. Pt to return tomorrow in 24 hours for second dose     Chief Complaint   Patient presents with    R/o  Labor     Sent in from office-- examined by Dr. Choi and was called          Hemalatha ZIMMERMAN RN  2025 4:41 PM         [1]   Allergies  Allergen Reactions    Gramineae Pollens UNKNOWN

## 2025-01-07 ENCOUNTER — HOSPITAL ENCOUNTER (OUTPATIENT)
Facility: HOSPITAL | Age: 40
Discharge: HOME | End: 2025-01-07
Attending: OBSTETRICS & GYNECOLOGY | Admitting: OBSTETRICS & GYNECOLOGY
Payer: COMMERCIAL

## 2025-01-07 PROCEDURE — 96372 THER/PROPH/DIAG INJ SC/IM: CPT

## 2025-01-07 RX ORDER — BETAMETHASONE SODIUM PHOSPHATE AND BETAMETHASONE ACETATE 3; 3 MG/ML; MG/ML
INJECTION, SUSPENSION INTRA-ARTICULAR; INTRALESIONAL; INTRAMUSCULAR; SOFT TISSUE
Status: COMPLETED
Start: 2025-01-07 | End: 2025-01-07

## 2025-01-07 RX ORDER — BETAMETHASONE SODIUM PHOSPHATE AND BETAMETHASONE ACETATE 3; 3 MG/ML; MG/ML
12 INJECTION, SUSPENSION INTRA-ARTICULAR; INTRALESIONAL; INTRAMUSCULAR; SOFT TISSUE EVERY 24 HOURS
Status: DISCONTINUED | OUTPATIENT
Start: 2025-01-07 | End: 2025-01-07

## 2025-01-10 ENCOUNTER — PATIENT MESSAGE (OUTPATIENT)
Dept: OBGYN CLINIC | Facility: CLINIC | Age: 40
End: 2025-01-10

## 2025-01-16 LAB — T. PALLIDUM AB, EIA: NEGATIVE

## 2025-01-20 ENCOUNTER — HOSPITAL ENCOUNTER (OUTPATIENT)
Dept: PERINATAL CARE | Facility: HOSPITAL | Age: 40
Discharge: HOME | End: 2025-01-20
Attending: OBSTETRICS & GYNECOLOGY
Payer: COMMERCIAL

## 2025-01-20 ENCOUNTER — HOSPITAL ENCOUNTER (OUTPATIENT)
Dept: PERINATAL CARE | Facility: HOSPITAL | Age: 40
End: 2025-01-20
Attending: OBSTETRICS & GYNECOLOGY
Payer: COMMERCIAL

## 2025-01-20 VITALS
DIASTOLIC BLOOD PRESSURE: 73 MMHG | SYSTOLIC BLOOD PRESSURE: 108 MMHG | WEIGHT: 174 LBS | BODY MASS INDEX: 31 KG/M2 | HEART RATE: 90 BPM

## 2025-01-20 DIAGNOSIS — O09.523 MULTIGRAVIDA OF ADVANCED MATERNAL AGE IN THIRD TRIMESTER (HCC): ICD-10-CM

## 2025-01-20 DIAGNOSIS — O46.92 VAGINAL BLEEDING IN PREGNANCY, SECOND TRIMESTER (HCC): ICD-10-CM

## 2025-01-20 DIAGNOSIS — O09.523 MULTIGRAVIDA OF ADVANCED MATERNAL AGE IN THIRD TRIMESTER (HCC): Primary | ICD-10-CM

## 2025-01-20 DIAGNOSIS — M34.9 SCLERODERMA (HCC): ICD-10-CM

## 2025-01-20 DIAGNOSIS — O09.522 MULTIGRAVIDA OF ADVANCED MATERNAL AGE IN SECOND TRIMESTER (HCC): ICD-10-CM

## 2025-01-20 PROCEDURE — 76819 FETAL BIOPHYS PROFIL W/O NST: CPT

## 2025-01-20 PROCEDURE — 76816 OB US FOLLOW-UP PER FETUS: CPT | Performed by: OBSTETRICS & GYNECOLOGY

## 2025-01-20 NOTE — PROGRESS NOTES
Outpatient Maternal-Fetal Medicine Follow-Up    Dear Dr. Persaud    Thank you for requesting ultrasound evaluation and maternal fetal medicine consultation on your patient Susie Herr.  As you are aware she is a 40 year old female  with a quezada pregnancy and an Estimated Date of Delivery: 3/17/25.  She returned to maternal-fetal medicine today for a follow-up visit.  Her history was reviewed from her prior visit and there were no interval changes.      Feeling good.  Pregnancy going well thus far.  Antepartum Risk Factors  AMA: low-risk cell free fetal DNA, declined invasive testing   Scleroderma - stable  Second trimester spotting.    OB History    Para Term  AB Living   7 2 2   4 2   SAB IAB Ectopic Multiple Live Births   3 1     2      # Outcome Date GA Lbr Tin/2nd Weight Sex Type Anes PTL Lv   7 Current            6 SAB 24 7w3d    SAB None     5 Term 11/15/22 37w0d  6 lb 1 oz (2.75 kg) M NORMAL SPONT EPI Y BERTHA      Birth Comments: Bleeding issue in pregnancy d/t placenta previa, that resolved   4 SAB 2022 6w0d    SAB None     3 Term 21 40w3d  6 lb 10 oz (3.005 kg) M NORMAL SPONT EPI N BERTHA      Birth Comments: Forcep delivery      Complications: Temp 100.4 or greater, Intraamniotic Infection, Meconium   2 IAB 2018 24w0d   M THERAPEUTIC Gen        Birth Comments: Had CMV   1 2017 10w0d    SAB Gen        Birth Comments: D&C       PHYSICAL EXAMINATION:  /73   Pulse 90   Wt 174 lb (78.9 kg)   LMP 06/10/2024 (Exact Date)   BMI 30.82 kg/m²   General: alert and oriented, no acute distress  Abdomen: gravid, soft, non-tender      OBSTETRIC ULTRASOUND  Ultrasound Findings:  Single IUP in breech presentation.    Placenta is anterior.   A 3 vessel cord is noted.  Cardiac activity is present at 124 bpm  EFW 2089 g ( 4 lb 10 oz); 61%.    MAX is  19.9 cm.  MVP is 6.3 cm  BPP is 8/8.     The fetal measurements are consistent with established EDC. No gross  ultrasound evidence of structural abnormalities are seen today. The patient understands that ultrasound cannot rule out all structural and chromosomal abnormalities.     See PACS/Imaging Tab For Complete Ultrasound Report  I interpreted the results and reviewed them with the patient.    DISCUSSION  During her visit we discussed and reviewed the following issues:  ADVANCED MATERNAL AGE  See prior MFM notes for a detailed review.  She did not desire invasive genetic testing.   She has already obtained a low-risk NPIT result and was appropriately reassured.     SCLERODERMA  See prior MFM notes for a detailed review.    GLUCOSE 1HR OB   Date Value Ref Range Status   12/03/2024 118 <135 mg/dL Final   08/13/2024 98 <135 mg/dL Final      IMPRESSION:  IUP at 32w0d   AMA: low-risk cell free fetal DNA, declined invasive testing   Scleroderma - stable  Second trimester spotting.       RECOMMENDATIONS:  Continue care with Dr. Persaud  Close surveillance for potential PIH and flares  Monthly growth ultrasounds in the third trimester  Weekly NST by 32 weeks and sooner if the clinical situation dictates      Thank you for allowing me to participate in the care of your patient.  Please do not hesitate to contact me if additional questions or concerns arise.      Re Boo MD     20 minutes spent in review of records, patient consultation, documentation and coordination of care.  The relevant clinical matter(s) are summarized above.     Note to patient and family  The 21st Century Cures Act makes medical notes available to patients in the interest of transparency.  However, please be advised that this is a medical document.  It is intended as [id number] communication.  It is written and medical language may contain abbreviations or verbiage that are technical and unfamiliar.  It may appear blunt or direct.  Medical documents are intended to carry relevant information, facts as evident, and the clinical opinion of the  practitioner.

## 2025-01-23 ENCOUNTER — ROUTINE PRENATAL (OUTPATIENT)
Dept: OBGYN CLINIC | Facility: CLINIC | Age: 40
End: 2025-01-23
Payer: COMMERCIAL

## 2025-01-23 VITALS
HEART RATE: 106 BPM | BODY MASS INDEX: 30 KG/M2 | DIASTOLIC BLOOD PRESSURE: 69 MMHG | WEIGHT: 171 LBS | SYSTOLIC BLOOD PRESSURE: 107 MMHG

## 2025-01-23 DIAGNOSIS — Z34.83 ENCOUNTER FOR SUPERVISION OF OTHER NORMAL PREGNANCY IN THIRD TRIMESTER (HCC): Primary | ICD-10-CM

## 2025-01-23 PROBLEM — O47.00 THREATENED PREMATURE LABOR, ANTEPARTUM (HCC): Status: ACTIVE | Noted: 2025-01-23

## 2025-01-23 PROBLEM — K21.9 GASTROESOPHAGEAL REFLUX DISEASE: Status: RESOLVED | Noted: 2024-04-18 | Resolved: 2025-01-23

## 2025-01-23 PROBLEM — Z79.899 LONG-TERM CURRENT USE OF PROTON PUMP INHIBITOR THERAPY: Status: RESOLVED | Noted: 2024-04-18 | Resolved: 2025-01-23

## 2025-01-23 LAB
APPEARANCE: CLEAR
GLUCOSE (URINE DIPSTICK): NEGATIVE MG/DL
MULTISTIX LOT#: NORMAL NUMERIC
NITRITE, URINE: NEGATIVE
URINE-COLOR: YELLOW

## 2025-01-23 PROCEDURE — 81002 URINALYSIS NONAUTO W/O SCOPE: CPT | Performed by: OBSTETRICS & GYNECOLOGY

## 2025-01-23 PROCEDURE — 90715 TDAP VACCINE 7 YRS/> IM: CPT | Performed by: OBSTETRICS & GYNECOLOGY

## 2025-01-23 PROCEDURE — 90471 IMMUNIZATION ADMIN: CPT | Performed by: OBSTETRICS & GYNECOLOGY

## 2025-01-28 ENCOUNTER — HOSPITAL ENCOUNTER (OUTPATIENT)
Dept: PERINATAL CARE | Facility: HOSPITAL | Age: 40
Discharge: HOME | End: 2025-01-28
Attending: OBSTETRICS & GYNECOLOGY
Payer: COMMERCIAL

## 2025-01-28 DIAGNOSIS — O09.529 ADVANCED MATERNAL AGE IN MULTIGRAVIDA (HCC): ICD-10-CM

## 2025-01-28 DIAGNOSIS — O09.522 MULTIGRAVIDA OF ADVANCED MATERNAL AGE IN SECOND TRIMESTER (HCC): Primary | ICD-10-CM

## 2025-01-28 PROCEDURE — 59025 FETAL NON-STRESS TEST: CPT

## 2025-01-28 PROCEDURE — 59025 FETAL NON-STRESS TEST: CPT | Performed by: OBSTETRICS & GYNECOLOGY

## 2025-01-30 ENCOUNTER — NST DOCUMENTATION (OUTPATIENT)
Dept: OBGYN CLINIC | Facility: CLINIC | Age: 40
End: 2025-01-30

## 2025-01-30 NOTE — NST
Nonstress Test   Patient: Susie Herr    Gestation: 33w3d    Diagnosis from order:  AMA    Problem List:   Patient Active Problem List   Diagnosis    Scleroderma (HCC)    Gastroesophageal reflux disease with esophagitis without hemorrhage    Esophageal dysmotility    Raynaud's disease without gangrene    AMA--40    Vaginal bleeding in pregnancy, second trimester ([facility])    Threatened premature labor, antepartum ([facility])       NST:        2025   NST DOCUMENTATION   Variability 6-25 BPM   Accelerations Yes   Decelerations None   Baseline 120 BPM   Uterine Irritability No   Contractions Not present   Acoustic Stimulator No   Nonstress Test Interpretation Reactive   NST Completed by Burt FERRARA   Disposition Home    Testing Plan Weekly NST   Provider Notified Jimbo PEREZ         I agree with the above evaluation. NST completed.  Rosenda Choi MD  2025  9:23 AM

## 2025-01-31 NOTE — TELEPHONE ENCOUNTER
Requested Prescriptions     Pending Prescriptions Disp Refills    OMEPRAZOLE 40 MG Oral Capsule Delayed Release [Pharmacy Med Name: OMEPRAZOLE 40MG CAPSULES] 180 capsule 3     Sig: TAKE ONE CAPSULE BY MOUTH TWICE DAILY BEFORE MEALS(FIRST DOSE BEFORE BREAKFAST)       LOV  4/17/2024  LR 4/17/2024    em

## 2025-02-03 ENCOUNTER — NURSE ONLY (OUTPATIENT)
Dept: LAB | Age: 40
End: 2025-02-03
Attending: NURSE PRACTITIONER
Payer: COMMERCIAL

## 2025-02-03 ENCOUNTER — OFFICE VISIT (OUTPATIENT)
Dept: FAMILY MEDICINE CLINIC | Facility: CLINIC | Age: 40
End: 2025-02-03
Payer: COMMERCIAL

## 2025-02-03 VITALS
OXYGEN SATURATION: 98 % | HEART RATE: 97 BPM | HEIGHT: 63 IN | TEMPERATURE: 98 F | RESPIRATION RATE: 18 BRPM | WEIGHT: 171 LBS | DIASTOLIC BLOOD PRESSURE: 74 MMHG | BODY MASS INDEX: 30.3 KG/M2 | SYSTOLIC BLOOD PRESSURE: 101 MMHG

## 2025-02-03 DIAGNOSIS — R68.89 FLU-LIKE SYMPTOMS: ICD-10-CM

## 2025-02-03 DIAGNOSIS — J10.1 INFLUENZA A: Primary | ICD-10-CM

## 2025-02-03 PROBLEM — R00.2 PALPITATIONS: Status: ACTIVE | Noted: 2019-03-13

## 2025-02-03 PROBLEM — R76.8 ANA POSITIVE: Status: ACTIVE | Noted: 2018-01-18

## 2025-02-03 PROBLEM — U07.1 COVID-19: Status: ACTIVE | Noted: 2022-06-21

## 2025-02-03 PROBLEM — M34.9: Status: ACTIVE | Noted: 2019-03-13

## 2025-02-03 PROBLEM — N81.6 RECTOCELE: Status: ACTIVE | Noted: 2021-07-28

## 2025-02-03 PROBLEM — R10.13 DYSPEPSIA: Status: ACTIVE | Noted: 2019-08-13

## 2025-02-03 PROBLEM — G93.0 CHOROID PLEXUS CYST: Status: ACTIVE | Noted: 2020-12-15

## 2025-02-03 PROBLEM — R32 URINARY INCONTINENCE: Status: ACTIVE | Noted: 2021-07-28

## 2025-02-03 PROBLEM — N81.11 CYSTOCELE, MIDLINE: Status: ACTIVE | Noted: 2021-07-28

## 2025-02-03 PROBLEM — B00.9 HERPES SIMPLEX: Status: ACTIVE | Noted: 2022-09-29

## 2025-02-03 PROBLEM — L85.8 KERATOSIS PILARIS: Status: ACTIVE | Noted: 2017-03-16

## 2025-02-03 LAB
POCT INFLUENZA A: POSITIVE
POCT INFLUENZA B: NEGATIVE

## 2025-02-03 PROCEDURE — 87502 INFLUENZA DNA AMP PROBE: CPT

## 2025-02-03 RX ORDER — OSELTAMIVIR PHOSPHATE 75 MG/1
75 CAPSULE ORAL 2 TIMES DAILY
Qty: 10 CAPSULE | Refills: 0 | Status: SHIPPED | OUTPATIENT
Start: 2025-02-03 | End: 2025-02-08

## 2025-02-03 RX ORDER — OMEPRAZOLE 40 MG/1
CAPSULE, DELAYED RELEASE ORAL
Qty: 180 CAPSULE | Refills: 3 | Status: SHIPPED | OUTPATIENT
Start: 2025-02-03

## 2025-02-03 NOTE — PROGRESS NOTES
CHIEF COMPLAINT:     Chief Complaint   Patient presents with    Flu     Sore throat, headache, fatigue, body aches. Been caring for my toddler who is sick and was exposed to influenza A at . Pregnant in my third trimester so want to get tested for the flu. - Entered by patient  Sore throat, fatigue, chills, body aches, headache x1day       HPI:   Susie Herr is a 40 year old female who presents for upper respiratory symptoms for  1 days. Patient reports head ache, fatigue, body aches sore throat, congestion, low grade fever, denies cough, denies sinus pain. Symptoms have been worsening since onset.  Treating symptoms with tylenol last night.  No home COVID testing at home. Minimal painful swallowing. Received flu vaccines this season. Denies shortness of breath and difficulty breathing.       Current Outpatient Medications   Medication Sig Dispense Refill    Calcium-Cholecalciferol (QC CALCIUM 500MG-D3) 500-5 MG-MCG Oral Tab       oseltamivir 75 MG Oral Cap Take 1 capsule (75 mg total) by mouth 2 (two) times daily for 5 days. 10 capsule 0    OMEPRAZOLE 40 MG Oral Capsule Delayed Release TAKE ONE CAPSULE BY MOUTH TWICE DAILY BEFORE MEALS(FIRST DOSE BEFORE BREAKFAST) 180 capsule 3    albuterol 108 (90 Base) MCG/ACT Inhalation Aero Soln Inhale 2 puffs into the lungs every 4 to 6 hours as needed for Wheezing. 1 each 0    aspirin 81 MG Oral Tab EC       Loratadine 10 MG Oral Cap Take 10 mg by mouth as needed.      prenatal vitamin with DHA 27-0.8-228 MG Oral Cap Take 1 capsule by mouth daily.      famotidine (PEPCID AC) 10 MG Oral Tab Take 1 tablet (10 mg total) by mouth nightly as needed.        Past Medical History:    Chlamydia    Decorative tattoo    Esophagus disorder    Genital herpes simplex    Human papilloma virus infection    Miscarriage ([facility])    2-2024    Raynaud's syndrome    Scleroderma ([facility])      Past Surgical History:   Procedure Laterality Date    D & c      Egd      Other accessory       ex lap for bowel injury after appendectomy    Other surgical history  2006    apendoctomy         Social History     Socioeconomic History    Marital status:    Tobacco Use    Smoking status: Never    Smokeless tobacco: Never   Vaping Use    Vaping status: Never Used   Substance and Sexual Activity    Alcohol use: Not Currently     Alcohol/week: 2.0 standard drinks of alcohol     Types: 2 Glasses of wine per week     Comment: occasional, prior to pregnancy    Drug use: Never    Sexual activity: Yes     Social Drivers of Health     Financial Resource Strain: Low Risk  (8/19/2024)    Financial Resource Strain     Difficulty of Paying Living Expenses: Not hard at all     Med Affordability: No   Food Insecurity: No Food Insecurity (8/19/2024)    Food Insecurity     Food Insecurity: Never true   Transportation Needs: No Transportation Needs (8/19/2024)    Transportation Needs     Lack of Transportation: No   Stress: No Stress Concern Present (8/19/2024)    Stress     Feeling of Stress : No   Housing Stability: Low Risk  (8/19/2024)    Housing Stability     Housing Instability: No         REVIEW OF SYSTEMS:   Review of Systems   Constitutional:  Positive for fatigue.   HENT:  Positive for sore throat.    Musculoskeletal:  Positive for myalgias.   Neurological:  Positive for headaches.          EXAM:   /74   Pulse 97   Temp 98 °F (36.7 °C) (Temporal)   Resp 18   Ht 5' 3\" (1.6 m)   Wt 171 lb (77.6 kg)   LMP 06/10/2024 (Exact Date)   SpO2 98%   BMI 30.29 kg/m²   Physical Exam  Vitals and nursing note reviewed.   Constitutional:       Appearance: Normal appearance. She is not ill-appearing.   HENT:      Head: Normocephalic and atraumatic.      Right Ear: Hearing, tympanic membrane, ear canal and external ear normal. No drainage. There is no impacted cerumen. Tympanic membrane is not injected, perforated, erythematous or bulging.      Left Ear: Hearing, tympanic membrane, ear canal and external ear  normal. No drainage. There is no impacted cerumen. Tympanic membrane is not injected, perforated, erythematous or bulging.      Nose: Mucosal edema, congestion and rhinorrhea present.      Right Sinus: No maxillary sinus tenderness or frontal sinus tenderness.      Left Sinus: No maxillary sinus tenderness or frontal sinus tenderness.      Mouth/Throat:      Lips: No lesions.      Mouth: Mucous membranes are moist. No oral lesions.      Palate: No lesions.      Pharynx: Oropharynx is clear. Uvula midline. No oropharyngeal exudate or posterior oropharyngeal erythema.      Tonsils: No tonsillar exudate or tonsillar abscesses.   Eyes:      General: No scleral icterus.        Right eye: No discharge.         Left eye: No discharge.      Conjunctiva/sclera: Conjunctivae normal.   Cardiovascular:      Rate and Rhythm: Normal rate and regular rhythm.      Heart sounds: Normal heart sounds. No murmur heard.  Pulmonary:      Effort: Pulmonary effort is normal.      Breath sounds: Normal breath sounds. No wheezing.   Lymphadenopathy:      Cervical: No cervical adenopathy.   Skin:     General: Skin is warm and dry.   Neurological:      Mental Status: She is alert and oriented to person, place, and time.   Psychiatric:         Mood and Affect: Mood normal.         Behavior: Behavior normal.           ASSESSMENT AND PLAN:   Susie Herr is a 40 year old female who presents with upper respiratory symptoms that are consistent with    ASSESSMENT:   Encounter Diagnoses   Name Primary?    Influenza A Yes    Flu-like symptoms        PLAN: Positive rapid influenza A testing in clinic today. Will treat with tamiflu/ Meds as below.  Comfort care as described in Patient Instructions. Push fluids and rest.    Meds & Refills for this Visit:  Requested Prescriptions     Signed Prescriptions Disp Refills    oseltamivir 75 MG Oral Cap 10 capsule 0     Sig: Take 1 capsule (75 mg total) by mouth 2 (two) times daily for 5 days.      Risks, benefits, and side effects of medication explained and discussed.    The patient indicates understanding of these issues and agrees to the plan.  The patient is asked to f/u with PCP if sx's persist.   If symptoms worsen, report to nearest emergency room for prompt re-evaluation and treatment.

## 2025-02-04 ENCOUNTER — HOSPITAL ENCOUNTER (OUTPATIENT)
Facility: HOSPITAL | Age: 40
Discharge: HOME | End: 2025-02-04
Attending: OBSTETRICS & GYNECOLOGY | Admitting: OBSTETRICS & GYNECOLOGY
Payer: COMMERCIAL

## 2025-02-04 ENCOUNTER — APPOINTMENT (OUTPATIENT)
Dept: OBGYN CLINIC | Facility: HOSPITAL | Age: 40
End: 2025-02-04
Payer: COMMERCIAL

## 2025-02-04 VITALS
HEART RATE: 96 BPM | SYSTOLIC BLOOD PRESSURE: 102 MMHG | TEMPERATURE: 98 F | RESPIRATION RATE: 17 BRPM | DIASTOLIC BLOOD PRESSURE: 64 MMHG

## 2025-02-04 PROCEDURE — 59025 FETAL NON-STRESS TEST: CPT

## 2025-02-04 PROCEDURE — 59025 FETAL NON-STRESS TEST: CPT | Performed by: OBSTETRICS & GYNECOLOGY

## 2025-02-04 NOTE — PROGRESS NOTES
Pt is a 40 year old female admitted to TR2/TR2-A.     Chief Complaint   Patient presents with    Non-stress Test     Pt here for scheduled NST due to AMA. Pt states she is feeling baby moving well. Pt denies having leaking of fluid, vaginal bleeding or contraction pain.      Pt is  34w1d intra-uterine pregnancy.  History obtained, consents signed. Oriented to room, staff, and plan of care.

## 2025-02-04 NOTE — NST
Nonstress Test   Patient: Susie Herr    Gestation: 34w1d    NST:       Variability: Moderate           Accelerations: Yes           Decelerations: None            Baseline: 130 BPM           Uterine Irritability: No           Contractions: Not present                                        Acoustic Stimulator: No           Nonstress Test Interpretation: Reactive           Nonstress Test Second Interpretation: Reactive          FHR Category: Category I          Additional Comments Comments: Pt is a  at 34.1 here for scheduled NST due to AMA. NST reactive. Dr. Fisher informed of findings. Ok to discharge pt home. Pt provided with discharge instructions and labor precautions. Pt verbalizes understanding.

## 2025-02-06 ENCOUNTER — NST DOCUMENTATION (OUTPATIENT)
Dept: OBGYN CLINIC | Facility: CLINIC | Age: 40
End: 2025-02-06

## 2025-02-06 NOTE — NST
Nonstress Test   Patient: Susie Herr    Gestation: 34w3d    Diagnosis from order:      Problem List:   Patient Active Problem List   Diagnosis    Scleroderma (HCC)    Gastroesophageal reflux disease with esophagitis without hemorrhage    Esophageal dysmotility    Raynaud's disease without gangrene    AMA--40    Vaginal bleeding in pregnancy, second trimester ([facility])    Threatened premature labor, antepartum ([facility])    STEVEN positive    Choroid plexus cyst    COVID-19    Cystocele, midline    Dyspepsia    Herpes simplex    Keratosis pilaris    Palpitations    Rectocele    Urinary incontinence    Systemic sclerosis with limited cutaneous involvement ([facility])       NST:        2025   NST DOCUMENTATION   Variability 6-25 BPM   Accelerations Yes   Decelerations None   Baseline 130 BPM   Uterine Irritability No   Contractions Not present   Acoustic Stimulator No   Nonstress Test Interpretation Reactive   Nonstress Test Second Interpretation Reactive   FHR Category Category I   Comments Pt is a  at 34.1 here for scheduled NST due to AMA. NST reactive. Dr. Fisher informed of findings. Ok to discharge pt home. Pt provided with discharge instructions and labor precautions. Pt verbalizes understanding.   NST Completed by JON Linn RN   Disposition Home    Testing Plan Weekly NST   Provider Notified Dr. Fisher         I agree with the above evaluation. NST completed.  Jillian Fisher MD  2025  12:08 PM

## 2025-02-10 ENCOUNTER — ROUTINE PRENATAL (OUTPATIENT)
Dept: OBGYN CLINIC | Facility: CLINIC | Age: 40
End: 2025-02-10

## 2025-02-10 VITALS
HEART RATE: 85 BPM | WEIGHT: 175 LBS | SYSTOLIC BLOOD PRESSURE: 111 MMHG | BODY MASS INDEX: 31 KG/M2 | DIASTOLIC BLOOD PRESSURE: 75 MMHG

## 2025-02-10 DIAGNOSIS — Z34.93 ENCOUNTER FOR SUPERVISION OF NORMAL PREGNANCY IN THIRD TRIMESTER, UNSPECIFIED GRAVIDITY (HCC): Primary | ICD-10-CM

## 2025-02-10 PROBLEM — U07.1 COVID-19: Status: RESOLVED | Noted: 2022-06-21 | Resolved: 2025-02-10

## 2025-02-10 LAB
BILIRUBIN: NEGATIVE
GLUCOSE (URINE DIPSTICK): NEGATIVE MG/DL
KETONES (URINE DIPSTICK): NEGATIVE MG/DL
LEUKOCYTES: NEGATIVE
MULTISTIX LOT#: 57 NUMERIC
NITRITE, URINE: NEGATIVE
OCCULT BLOOD: NEGATIVE
PH, URINE: 6 (ref 4.5–8)
PROTEIN (URINE DIPSTICK): NEGATIVE MG/DL
SPECIFIC GRAVITY: 1.02 (ref 1–1.03)
UROBILINOGEN,SEMI-QN: 0.2 MG/DL (ref 0–1.9)

## 2025-02-10 PROCEDURE — 81002 URINALYSIS NONAUTO W/O SCOPE: CPT | Performed by: OBSTETRICS & GYNECOLOGY

## 2025-02-11 ENCOUNTER — HOSPITAL ENCOUNTER (OUTPATIENT)
Dept: PERINATAL CARE | Facility: HOSPITAL | Age: 40
Discharge: HOME | End: 2025-02-11
Attending: OBSTETRICS & GYNECOLOGY
Payer: COMMERCIAL

## 2025-02-11 DIAGNOSIS — O09.522 MULTIGRAVIDA OF ADVANCED MATERNAL AGE IN SECOND TRIMESTER (HCC): Primary | ICD-10-CM

## 2025-02-11 PROCEDURE — 59025 FETAL NON-STRESS TEST: CPT

## 2025-02-17 ENCOUNTER — HOSPITAL ENCOUNTER (OUTPATIENT)
Dept: PERINATAL CARE | Facility: HOSPITAL | Age: 40
Discharge: HOME | End: 2025-02-17
Attending: OBSTETRICS & GYNECOLOGY
Payer: COMMERCIAL

## 2025-02-17 ENCOUNTER — HOSPITAL ENCOUNTER (OUTPATIENT)
Dept: PERINATAL CARE | Facility: HOSPITAL | Age: 40
End: 2025-02-17
Attending: OBSTETRICS & GYNECOLOGY
Payer: COMMERCIAL

## 2025-02-17 VITALS
SYSTOLIC BLOOD PRESSURE: 114 MMHG | BODY MASS INDEX: 31 KG/M2 | WEIGHT: 175 LBS | HEART RATE: 101 BPM | DIASTOLIC BLOOD PRESSURE: 73 MMHG

## 2025-02-17 DIAGNOSIS — O09.529 ADVANCED MATERNAL AGE IN MULTIGRAVIDA (HCC): ICD-10-CM

## 2025-02-17 DIAGNOSIS — O09.523 MULTIGRAVIDA OF ADVANCED MATERNAL AGE IN THIRD TRIMESTER (HCC): Primary | ICD-10-CM

## 2025-02-17 DIAGNOSIS — O26.852 SPOTTING AFFECTING PREGNANCY IN SECOND TRIMESTER (HCC): ICD-10-CM

## 2025-02-17 DIAGNOSIS — M34.9 SCLERODERMA (HCC): ICD-10-CM

## 2025-02-17 DIAGNOSIS — O09.522 MULTIGRAVIDA OF ADVANCED MATERNAL AGE IN SECOND TRIMESTER (HCC): ICD-10-CM

## 2025-02-17 PROCEDURE — 76819 FETAL BIOPHYS PROFIL W/O NST: CPT

## 2025-02-17 PROCEDURE — 76816 OB US FOLLOW-UP PER FETUS: CPT | Performed by: OBSTETRICS & GYNECOLOGY

## 2025-02-17 NOTE — PROGRESS NOTES
Outpatient Maternal-Fetal Medicine Follow-Up    Dear Dr. Persaud    Thank you for requesting ultrasound evaluation and maternal fetal medicine consultation on your patient Susie Herr.  As you are aware she is a 40 year old female  with a quezada pregnancy and an Estimated Date of Delivery: 3/17/25.  She returned to maternal-fetal medicine today for a follow-up visit.  Her history was reviewed from her prior visit and there were no interval changes.      Feeling well.  Has right upper quadrant discomfort that is positional and similar to how she felt in both of her prgnancies before.  Antepartum Risk Factors  AMA: low-risk cell free fetal DNA, declined invasive testing   Scleroderma - stable  Second trimester spotting.    OB History    Para Term  AB Living   7 2 2   4 2   SAB IAB Ectopic Multiple Live Births   3 1     2      # Outcome Date GA Lbr Tin/2nd Weight Sex Type Anes PTL Lv   7 Current            6 SAB 24 7w3d    SAB None     5 Term 11/15/22 37w0d  6 lb 1 oz (2.75 kg) M NORMAL SPONT EPI Y BERTHA      Birth Comments: Bleeding issue in pregnancy d/t placenta previa, that resolved   4 SAB 2022 6w0d    SAB None     3 Term 21 40w3d  6 lb 10 oz (3.005 kg) M NORMAL SPONT EPI N BERTHA      Birth Comments: Forcep delivery      Complications: Temp 100.4 or greater, Intraamniotic Infection, Meconium   2 IAB 2018 24w0d   M THERAPEUTIC Gen        Birth Comments: Had CMV   1 2017 10w0d    SAB Gen        Birth Comments: D&C       PHYSICAL EXAMINATION:  /73   Pulse 101   Wt 175 lb (79.4 kg)   LMP 06/10/2024 (Exact Date)   BMI 31.00 kg/m²   General: alert and oriented, no acute distress  Abdomen: gravid, soft, non-tender      OBSTETRIC ULTRASOUND  Ultrasound Findings:  Single IUP in cephalic presentation.    Placenta is anterior, right.   A 3 vessel cord is noted.  Cardiac activity is present at 124 bpm  EFW 3003 g ( 6 lb 10 oz); 62%.    MAX is  17.8 cm.  MVP is  6.5 cm  BPP is 8/8.     The fetal measurements are consistent with established EDC. No gross ultrasound evidence of structural abnormalities are seen today. The patient understands that ultrasound cannot rule out all structural and chromosomal abnormalities.     See PACS/Imaging Tab For Complete Ultrasound Report  I interpreted the results and reviewed them with the patient.    DISCUSSION  During her visit we discussed and reviewed the following issues:  ADVANCED MATERNAL AGE  See prior MFM notes for a detailed review.  She did not desire invasive genetic testing.   She has already obtained a low-risk NPIT result and was appropriately reassured.     SCLERODERMA  See prior MFM notes for a detailed review.    GLUCOSE 1HR OB   Date Value Ref Range Status   12/03/2024 118 <135 mg/dL Final   08/13/2024 98 <135 mg/dL Final      IMPRESSION:  IUP at 36w0d   AMA: low-risk cell free fetal DNA, declined invasive testing   Scleroderma - stable  Second trimester spotting.       RECOMMENDATIONS:  Continue care with Dr. Persaud  Close surveillance for potential PIH and flares  Monthly growth ultrasounds in the third trimester  Weekly NST by 32 weeks and sooner if the clinical situation dictates  , twice weekly testing at 38 weeks (weekly BPP and Weekly NST)  Delivery at 39-40 weeks    Thank you for allowing me to participate in the care of your patient.  Please do not hesitate to contact me if additional questions or concerns arise.      Re Boo MD     20 minutes spent in review of records, patient consultation, documentation and coordination of care.  The relevant clinical matter(s) are summarized above.     Note to patient and family  The 21st Century Cures Act makes medical notes available to patients in the interest of transparency.  However, please be advised that this is a medical document.  It is intended as [id number] communication.  It is written and medical language may contain abbreviations or verbiage that are  technical and unfamiliar.  It may appear blunt or direct.  Medical documents are intended to carry relevant information, facts as evident, and the clinical opinion of the practitioner.

## 2025-02-18 ENCOUNTER — TELEPHONE (OUTPATIENT)
Dept: OBGYN CLINIC | Facility: CLINIC | Age: 40
End: 2025-02-18

## 2025-02-18 ENCOUNTER — HOSPITAL ENCOUNTER (OUTPATIENT)
Facility: HOSPITAL | Age: 40
Discharge: HOME | End: 2025-02-18
Attending: OBSTETRICS & GYNECOLOGY | Admitting: OBSTETRICS & GYNECOLOGY
Payer: COMMERCIAL

## 2025-02-18 LAB
BILIRUB UR QL: NEGATIVE
CLARITY UR: CLEAR
GLUCOSE UR-MCNC: NORMAL MG/DL
HGB UR QL STRIP.AUTO: NEGATIVE
KETONES UR-MCNC: NEGATIVE MG/DL
LEUKOCYTE ESTERASE UR QL STRIP.AUTO: NEGATIVE
NITRITE UR QL STRIP.AUTO: NEGATIVE
PH UR: 6.5 [PH] (ref 5–8)
PROT UR-MCNC: NEGATIVE MG/DL
SP GR UR STRIP: 1.01 (ref 1–1.03)
UROBILINOGEN UR STRIP-ACNC: NORMAL

## 2025-02-18 PROCEDURE — 59025 FETAL NON-STRESS TEST: CPT | Performed by: OBSTETRICS & GYNECOLOGY

## 2025-02-18 NOTE — TELEPHONE ENCOUNTER
36w1d.  Patient states she lost her mucus plug and had a blood show last night.  After that contractions started.  Patient states throughout the night she drank about 60oz of water.  Contractions have been getting closer together.  For the past few hours they have been every 8-10 minutes apart.  Patient states they are not a full minute long and are uncomfortable but manageable.  Patient advised to go to [facility] for evaluation.  [facility] and Dr. Hung townsend.

## 2025-02-18 NOTE — PROGRESS NOTES
Pt is a 40 year old female admitted to TR2/TR2-A.     Chief Complaint   Patient presents with    R/o  Labor     Lost mucous plug last night with contractions occurring since then about 8-10 minutes apart      Pt is  36w1d intra-uterine pregnancy.  History obtained, consents signed. Oriented to room, staff, and plan of care.

## 2025-02-18 NOTE — TRIAGE
[facility]  part of [facility]      Triage Note    Susie Herr Patient Status:  Outpatient    1985 MRN [id number]   Location [facility] Attending Padma Persaud MD   Hosp Day # 0 PCP No primary care provider on file.      Para:   Estimated Date of Delivery: 3/17/25  Gestation: 36w1d    Chief Complaint    R/o  Labor         Allergies:  Allergies[1]    Orders Placed This Encounter   Procedures    Urinalysis with Culture Reflex       Lab Results   Component Value Date    WBC 8.9 2024    HGB 12.0 2024    HCT 37.6 2024     2024    CREATSERUM 0.59 2024    BUN 12 2024     2024    K 4.0 2024     2024    CO2 22 2024     (H) 2024    CA 8.2 (L) 2024    ALB 3.3 (L) 2024    ALKPHO 57 2024    BILT 0.3 2024    TP 5.9 (L) 2024    AST 14 2024    ALT 11 2024    RPR 1:2 (H) 2024       Clinitek UA  Lab Results   Component Value Date    GLUUR Normal 2025    SPECGRAVITY 1.007 2025       UA  Lab Results   Component Value Date    COLORUR Light-Yellow 2025    CLARITY Clear 2025    SPECGRAVITY 1.007 2025    PROUR Negative 2025    GLUUR Normal 2025    KETUR Negative 2025    BILUR Negative 2025    BLOODURINE Negative 2025    NITRITE Negative 2025    UROBILINOGEN Normal 2025    LEUUR Negative 2025       There were no vitals filed for this visit.    NST  Variability: Moderate           Accelerations: Yes           Decelerations: None            Baseline: 125 BPM           Uterine Irritability: Yes           Contractions: Not present                                        Acoustic Stimulator: No           Nonstress Test Interpretation: Reactive           Nonstress Test Second Interpretation: Reactive                        Additional Comments        Chief Complaint   Patient presents with    R/o  Labor     Lost mucous plug last night with contractions occurring since then about 8-10 minutes apart     EFM tracing reactive with irritability noted. + FM. Initial SVE 1.5/80/-3. Reexamined after 2 hours. SVE unchanged. Dr Persaud notified. Discharge order received. Kick count and  labor precautions reviewed with pt. Pt states understanding. Discharged to home.     Michelle CLARK RN  2025 2:26 PM    Agree with above documentation.  Padma Persaud MD          [1]   Allergies  Allergen Reactions    Gramineae Pollens UNKNOWN

## 2025-02-20 ENCOUNTER — ROUTINE PRENATAL (OUTPATIENT)
Dept: OBGYN CLINIC | Facility: CLINIC | Age: 40
End: 2025-02-20
Payer: COMMERCIAL

## 2025-02-20 VITALS
WEIGHT: 178 LBS | BODY MASS INDEX: 32 KG/M2 | SYSTOLIC BLOOD PRESSURE: 113 MMHG | DIASTOLIC BLOOD PRESSURE: 75 MMHG | HEART RATE: 62 BPM

## 2025-02-20 DIAGNOSIS — Z34.93 ENCOUNTER FOR SUPERVISION OF NORMAL PREGNANCY IN THIRD TRIMESTER, UNSPECIFIED GRAVIDITY (HCC): Primary | ICD-10-CM

## 2025-02-20 PROBLEM — A60.00 GENITAL HERPES: Status: ACTIVE | Noted: 2025-02-20

## 2025-02-20 LAB
BILIRUBIN: NEGATIVE
GLUCOSE (URINE DIPSTICK): NEGATIVE MG/DL
KETONES (URINE DIPSTICK): NEGATIVE MG/DL
LEUKOCYTES: NEGATIVE
MULTISTIX LOT#: 57 NUMERIC
NITRITE, URINE: NEGATIVE
OCCULT BLOOD: NEGATIVE
PH, URINE: 6 (ref 4.5–8)
PROTEIN (URINE DIPSTICK): NEGATIVE MG/DL
SPECIFIC GRAVITY: 1.03 (ref 1–1.03)
UROBILINOGEN,SEMI-QN: 0.2 MG/DL (ref 0–1.9)

## 2025-02-20 PROCEDURE — 81002 URINALYSIS NONAUTO W/O SCOPE: CPT | Performed by: OBSTETRICS & GYNECOLOGY

## 2025-02-20 RX ORDER — VALACYCLOVIR HYDROCHLORIDE 500 MG/1
1000 TABLET, FILM COATED ORAL DAILY
Qty: 60 TABLET | Refills: 1 | Status: SHIPPED | OUTPATIENT
Start: 2025-02-20

## 2025-02-20 NOTE — PROGRESS NOTES
Lost mucous plug recently  Having [facility].  Doing NSTs.  Valtrex rx'ed.  GBS collected.  Patient requested cervical exam 1.5/80/-3 (unchanged from triage earlier this week).  RTC 1 week.

## 2025-02-21 LAB
HEMATOCRIT: 37.7 % (ref 35–45)
HEMOGLOBIN: 11.6 G/DL (ref 11.7–15.5)
MCH: 25.3 PG (ref 27–33)
MCHC: 30.8 G/DL (ref 32–36)
MCV: 82.3 FL (ref 80–100)
MPV: 10.6 FL (ref 7.5–12.5)
PLATELET COUNT: 228 THOUSAND/UL (ref 140–400)
RDW: 13.6 % (ref 11–15)
RED BLOOD CELL COUNT: 4.58 MILLION/UL (ref 3.8–5.1)
WHITE BLOOD CELL COUNT: 7.4 THOUSAND/UL (ref 3.8–10.8)

## 2025-02-22 LAB — GROUP B STREP BY PCR FOR PCR OVT: POSITIVE

## 2025-02-25 ENCOUNTER — HOSPITAL ENCOUNTER (OUTPATIENT)
Dept: PERINATAL CARE | Facility: HOSPITAL | Age: 40
Discharge: HOME | End: 2025-02-25
Attending: OBSTETRICS & GYNECOLOGY
Payer: COMMERCIAL

## 2025-02-25 ENCOUNTER — NST DOCUMENTATION (OUTPATIENT)
Dept: OBGYN CLINIC | Facility: CLINIC | Age: 40
End: 2025-02-25

## 2025-02-25 DIAGNOSIS — O09.522 MULTIGRAVIDA OF ADVANCED MATERNAL AGE IN SECOND TRIMESTER (HCC): Primary | ICD-10-CM

## 2025-02-25 PROCEDURE — 59025 FETAL NON-STRESS TEST: CPT

## 2025-02-25 PROCEDURE — 59025 FETAL NON-STRESS TEST: CPT | Performed by: OBSTETRICS & GYNECOLOGY

## 2025-02-25 NOTE — NST
Nonstress Test   Patient: Susie Herr    Gestation: 37w1d    Diagnosis from order:  AMA    Problem List:   Patient Active Problem List   Diagnosis    Scleroderma (HCC)    Gastroesophageal reflux disease with esophagitis without hemorrhage    Esophageal dysmotility    Raynaud's disease without gangrene    AMA--40    Vaginal bleeding in pregnancy, second trimester ([facility])    Threatened premature labor, antepartum ([facility])    STEVEN positive    Choroid plexus cyst    Cystocele, midline    Dyspepsia    Herpes simplex    Keratosis pilaris    Palpitations    Rectocele    Urinary incontinence    Systemic sclerosis with limited cutaneous involvement ([facility])    Genital herpes       NST:        2025   NST DOCUMENTATION   Variability 6-25 BPM   Accelerations Yes   Decelerations None   Baseline 125 BPM   Uterine Irritability Yes   Contractions Not present   Acoustic Stimulator No   Nonstress Test Interpretation Reactive   NST Completed by Burt FERRARA   Disposition Home    Testing Plan Weekly NST   Provider Notified Santos PEREZ         I agree with the above evaluation. NST completed.  Sandra Arteaga MD  2025  1:58 PM

## 2025-02-27 ENCOUNTER — ROUTINE PRENATAL (OUTPATIENT)
Dept: OBGYN CLINIC | Facility: CLINIC | Age: 40
End: 2025-02-27
Payer: COMMERCIAL

## 2025-02-27 ENCOUNTER — ORDERS FOR ADMISSION (OUTPATIENT)
Dept: OBGYN CLINIC | Facility: CLINIC | Age: 40
End: 2025-02-27

## 2025-02-27 VITALS
SYSTOLIC BLOOD PRESSURE: 108 MMHG | DIASTOLIC BLOOD PRESSURE: 72 MMHG | HEART RATE: 80 BPM | WEIGHT: 177 LBS | BODY MASS INDEX: 31 KG/M2

## 2025-02-27 DIAGNOSIS — Z34.93 ENCOUNTER FOR SUPERVISION OF NORMAL PREGNANCY IN THIRD TRIMESTER, UNSPECIFIED GRAVIDITY (HCC): Primary | ICD-10-CM

## 2025-02-27 PROCEDURE — 81002 URINALYSIS NONAUTO W/O SCOPE: CPT | Performed by: OBSTETRICS & GYNECOLOGY

## 2025-02-27 RX ORDER — LIDOCAINE HYDROCHLORIDE 10 MG/ML
30 INJECTION, SOLUTION EPIDURAL; INFILTRATION; INTRACAUDAL; PERINEURAL ONCE
OUTPATIENT
Start: 2025-02-27 | End: 2025-02-27

## 2025-02-27 RX ORDER — SODIUM CHLORIDE, SODIUM LACTATE, POTASSIUM CHLORIDE, CALCIUM CHLORIDE 600; 310; 30; 20 MG/100ML; MG/100ML; MG/100ML; MG/100ML
INJECTION, SOLUTION INTRAVENOUS AS NEEDED
OUTPATIENT
Start: 2025-02-27

## 2025-02-27 RX ORDER — ACETAMINOPHEN 500 MG
500 TABLET ORAL EVERY 6 HOURS PRN
OUTPATIENT
Start: 2025-02-27

## 2025-02-27 RX ORDER — TERBUTALINE SULFATE 1 MG/ML
0.25 INJECTION SUBCUTANEOUS AS NEEDED
OUTPATIENT
Start: 2025-02-27

## 2025-02-27 RX ORDER — DEXTROSE, SODIUM CHLORIDE, SODIUM LACTATE, POTASSIUM CHLORIDE, AND CALCIUM CHLORIDE 5; .6; .31; .03; .02 G/100ML; G/100ML; G/100ML; G/100ML; G/100ML
INJECTION, SOLUTION INTRAVENOUS CONTINUOUS
OUTPATIENT
Start: 2025-02-27

## 2025-02-27 RX ORDER — ACETAMINOPHEN 500 MG
1000 TABLET ORAL EVERY 6 HOURS PRN
OUTPATIENT
Start: 2025-02-27

## 2025-02-27 RX ORDER — IBUPROFEN 200 MG
600 TABLET ORAL ONCE AS NEEDED
OUTPATIENT
Start: 2025-02-27

## 2025-02-27 RX ORDER — ONDANSETRON 2 MG/ML
4 INJECTION INTRAMUSCULAR; INTRAVENOUS EVERY 6 HOURS PRN
OUTPATIENT
Start: 2025-02-27

## 2025-02-27 RX ORDER — CITRIC ACID/SODIUM CITRATE 334-500MG
30 SOLUTION, ORAL ORAL AS NEEDED
OUTPATIENT
Start: 2025-02-27

## 2025-03-04 ENCOUNTER — HOSPITAL ENCOUNTER (OUTPATIENT)
Dept: PERINATAL CARE | Facility: HOSPITAL | Age: 40
Discharge: HOME | End: 2025-03-04
Attending: OBSTETRICS & GYNECOLOGY
Payer: COMMERCIAL

## 2025-03-04 DIAGNOSIS — O09.522 MULTIGRAVIDA OF ADVANCED MATERNAL AGE IN SECOND TRIMESTER (HCC): Primary | ICD-10-CM

## 2025-03-04 PROCEDURE — 59025 FETAL NON-STRESS TEST: CPT

## 2025-03-04 NOTE — PROGRESS NOTES
OB ULTRASOUND REPORT    Encounter for a limited & BPP ultrasound at the request of Dr. Temple.    See imaging tab for complete ultrasound report or in PACS    Ultrasound Findings:  Single IUP in cephalic presentation.    Placenta is anterior, high.   A 3 vessel cord is noted.  Cardiac activity is present at 153 bpm  MVP is 6 cm . MAX 20.5 cm  BPP is 8/8.       IMPRESSION:  1.  IUP at 38w3d  2.  Normal amniotic fluid index  3.  Reassuring  testing; BPP 88    This was an ultrasound only encounter (no physician visit).  The ultrasound was read by Dr. Lomax and the report was sent to Dr. Persaud to discuss with the patient.

## 2025-03-06 ENCOUNTER — HOSPITAL ENCOUNTER (OUTPATIENT)
Dept: PERINATAL CARE | Facility: HOSPITAL | Age: 40
Discharge: HOME | End: 2025-03-06
Attending: OBSTETRICS & GYNECOLOGY
Payer: COMMERCIAL

## 2025-03-06 ENCOUNTER — ROUTINE PRENATAL (OUTPATIENT)
Dept: OBGYN CLINIC | Facility: CLINIC | Age: 40
End: 2025-03-06
Payer: COMMERCIAL

## 2025-03-06 VITALS
HEART RATE: 88 BPM | WEIGHT: 175.38 LBS | SYSTOLIC BLOOD PRESSURE: 104 MMHG | DIASTOLIC BLOOD PRESSURE: 70 MMHG | BODY MASS INDEX: 31 KG/M2

## 2025-03-06 VITALS
SYSTOLIC BLOOD PRESSURE: 114 MMHG | WEIGHT: 177 LBS | HEART RATE: 89 BPM | BODY MASS INDEX: 31 KG/M2 | DIASTOLIC BLOOD PRESSURE: 75 MMHG

## 2025-03-06 DIAGNOSIS — O09.523 AMA (ADVANCED MATERNAL AGE) MULTIGRAVIDA 35+, THIRD TRIMESTER (HCC): Primary | ICD-10-CM

## 2025-03-06 DIAGNOSIS — O41.03X0 OLIGOHYDRAMNIOS IN THIRD TRIMESTER, SINGLE OR UNSPECIFIED FETUS (HCC): ICD-10-CM

## 2025-03-06 DIAGNOSIS — Z34.93 ENCOUNTER FOR SUPERVISION OF NORMAL PREGNANCY IN THIRD TRIMESTER, UNSPECIFIED GRAVIDITY (HCC): Primary | ICD-10-CM

## 2025-03-06 LAB
APPEARANCE: CLEAR
BILIRUBIN: NEGATIVE
GLUCOSE (URINE DIPSTICK): NEGATIVE MG/DL
KETONES (URINE DIPSTICK): NEGATIVE MG/DL
LEUKOCYTES: NEGATIVE
MULTISTIX LOT#: ABNORMAL NUMERIC
NITRITE, URINE: NEGATIVE
PH, URINE: 7.5 (ref 4.5–8)
PROTEIN (URINE DIPSTICK): NEGATIVE MG/DL
SPECIFIC GRAVITY: 1.01 (ref 1–1.03)
URINE-COLOR: YELLOW
UROBILINOGEN,SEMI-QN: 0.2 MG/DL (ref 0–1.9)

## 2025-03-06 PROCEDURE — 81002 URINALYSIS NONAUTO W/O SCOPE: CPT | Performed by: OBSTETRICS & GYNECOLOGY

## 2025-03-06 PROCEDURE — 76815 OB US LIMITED FETUS(S): CPT | Performed by: OBSTETRICS & GYNECOLOGY

## 2025-03-06 PROCEDURE — 76819 FETAL BIOPHYS PROFIL W/O NST: CPT

## 2025-03-10 ENCOUNTER — ANESTHESIA EVENT (OUTPATIENT)
Dept: OBGYN UNIT | Facility: HOSPITAL | Age: 40
End: 2025-03-10
Payer: COMMERCIAL

## 2025-03-10 ENCOUNTER — ANESTHESIA (OUTPATIENT)
Dept: OBGYN UNIT | Facility: HOSPITAL | Age: 40
End: 2025-03-10
Payer: COMMERCIAL

## 2025-03-10 ENCOUNTER — HOSPITAL ENCOUNTER (INPATIENT)
Facility: HOSPITAL | Age: 40
LOS: 2 days | Discharge: HOME | End: 2025-03-12
Attending: OBSTETRICS & GYNECOLOGY | Admitting: OBSTETRICS & GYNECOLOGY
Payer: COMMERCIAL

## 2025-03-10 ENCOUNTER — APPOINTMENT (OUTPATIENT)
Dept: OBGYN CLINIC | Facility: HOSPITAL | Age: 40
End: 2025-03-10
Attending: OBSTETRICS & GYNECOLOGY
Payer: COMMERCIAL

## 2025-03-10 PROBLEM — O09.529 AMA (ADVANCED MATERNAL AGE) MULTIGRAVIDA 35+ (HCC): Status: ACTIVE | Noted: 2025-03-10

## 2025-03-10 PROBLEM — Z34.90 PREGNANCY (HCC): Status: ACTIVE | Noted: 2025-03-10

## 2025-03-10 LAB
ANTIBODY SCREEN: NEGATIVE
BASOPHILS # BLD AUTO: 0.04 X10(3) UL (ref 0–0.2)
BASOPHILS NFR BLD AUTO: 0.5 %
DEPRECATED RDW RBC AUTO: 42.6 FL (ref 35.1–46.3)
EOSINOPHIL # BLD AUTO: 0.04 X10(3) UL (ref 0–0.7)
EOSINOPHIL NFR BLD AUTO: 0.5 %
ERYTHROCYTE [DISTWIDTH] IN BLOOD BY AUTOMATED COUNT: 15.2 % (ref 11–15)
HCT VFR BLD AUTO: 38.9 %
HGB BLD-MCNC: 12.3 G/DL
IMM GRANULOCYTES # BLD AUTO: 0.02 X10(3) UL (ref 0–1)
IMM GRANULOCYTES NFR BLD: 0.2 %
LYMPHOCYTES # BLD AUTO: 2.4 X10(3) UL (ref 1–4)
LYMPHOCYTES NFR BLD AUTO: 29.5 %
MCH RBC QN AUTO: 24.9 PG (ref 26–34)
MCHC RBC AUTO-ENTMCNC: 31.6 G/DL (ref 31–37)
MCV RBC AUTO: 78.7 FL
MONOCYTES # BLD AUTO: 0.55 X10(3) UL (ref 0.1–1)
MONOCYTES NFR BLD AUTO: 6.8 %
NEUTROPHILS # BLD AUTO: 5.09 X10 (3) UL (ref 1.5–7.7)
NEUTROPHILS # BLD AUTO: 5.09 X10(3) UL (ref 1.5–7.7)
NEUTROPHILS NFR BLD AUTO: 62.5 %
PLATELET # BLD AUTO: 209 10(3)UL (ref 150–450)
RBC # BLD AUTO: 4.94 X10(6)UL
RH BLOOD TYPE: POSITIVE
RH BLOOD TYPE: POSITIVE
T PALLIDUM AB SER QL IA: NONREACTIVE
WBC # BLD AUTO: 8.1 X10(3) UL (ref 4–11)

## 2025-03-10 PROCEDURE — 3E033VJ INTRODUCTION OF OTHER HORMONE INTO PERIPHERAL VEIN, PERCUTANEOUS APPROACH: ICD-10-PCS | Performed by: OBSTETRICS & GYNECOLOGY

## 2025-03-10 PROCEDURE — 59400 OBSTETRICAL CARE: CPT | Performed by: OBSTETRICS & GYNECOLOGY

## 2025-03-10 PROCEDURE — 10907ZC DRAINAGE OF AMNIOTIC FLUID, THERAPEUTIC FROM PRODUCTS OF CONCEPTION, VIA NATURAL OR ARTIFICIAL OPENING: ICD-10-PCS | Performed by: OBSTETRICS & GYNECOLOGY

## 2025-03-10 PROCEDURE — 0HQ9XZZ REPAIR PERINEUM SKIN, EXTERNAL APPROACH: ICD-10-PCS | Performed by: OBSTETRICS & GYNECOLOGY

## 2025-03-10 RX ORDER — IBUPROFEN 600 MG/1
600 TABLET, FILM COATED ORAL ONCE AS NEEDED
Status: COMPLETED | OUTPATIENT
Start: 2025-03-10 | End: 2025-03-10

## 2025-03-10 RX ORDER — ACETAMINOPHEN 500 MG
500 TABLET ORAL EVERY 6 HOURS PRN
Status: DISCONTINUED | OUTPATIENT
Start: 2025-03-10 | End: 2025-03-10 | Stop reason: HOSPADM

## 2025-03-10 RX ORDER — SODIUM CHLORIDE, SODIUM LACTATE, POTASSIUM CHLORIDE, CALCIUM CHLORIDE 600; 310; 30; 20 MG/100ML; MG/100ML; MG/100ML; MG/100ML
INJECTION, SOLUTION INTRAVENOUS AS NEEDED
Status: DISCONTINUED | OUTPATIENT
Start: 2025-03-10 | End: 2025-03-10 | Stop reason: HOSPADM

## 2025-03-10 RX ORDER — LIDOCAINE HYDROCHLORIDE AND EPINEPHRINE 15; 5 MG/ML; UG/ML
INJECTION, SOLUTION EPIDURAL AS NEEDED
Status: DISCONTINUED | OUTPATIENT
Start: 2025-03-10 | End: 2025-03-10 | Stop reason: SURG

## 2025-03-10 RX ORDER — AMMONIA 15 % (W/V)
0.3 AMPUL (EA) INHALATION AS NEEDED
Status: DISCONTINUED | OUTPATIENT
Start: 2025-03-10 | End: 2025-03-12

## 2025-03-10 RX ORDER — PANTOPRAZOLE SODIUM 20 MG/1
40 TABLET, DELAYED RELEASE ORAL
Status: DISCONTINUED | OUTPATIENT
Start: 2025-03-10 | End: 2025-03-12

## 2025-03-10 RX ORDER — LIDOCAINE HYDROCHLORIDE 10 MG/ML
INJECTION, SOLUTION INFILTRATION; PERINEURAL
Status: COMPLETED | OUTPATIENT
Start: 2025-03-10 | End: 2025-03-10

## 2025-03-10 RX ORDER — DOCUSATE SODIUM 100 MG/1
100 CAPSULE, LIQUID FILLED ORAL
Status: DISCONTINUED | OUTPATIENT
Start: 2025-03-10 | End: 2025-03-12

## 2025-03-10 RX ORDER — ACETAMINOPHEN 500 MG
1000 TABLET ORAL EVERY 6 HOURS PRN
Status: DISCONTINUED | OUTPATIENT
Start: 2025-03-10 | End: 2025-03-10 | Stop reason: HOSPADM

## 2025-03-10 RX ORDER — ONDANSETRON 2 MG/ML
4 INJECTION INTRAMUSCULAR; INTRAVENOUS EVERY 6 HOURS PRN
Status: DISCONTINUED | OUTPATIENT
Start: 2025-03-10 | End: 2025-03-10 | Stop reason: HOSPADM

## 2025-03-10 RX ORDER — BUPIVACAINE HCL/0.9 % NACL/PF 0.25 %
5 PLASTIC BAG, INJECTION (ML) EPIDURAL AS NEEDED
Status: DISCONTINUED | OUTPATIENT
Start: 2025-03-10 | End: 2025-03-12

## 2025-03-10 RX ORDER — ACETAMINOPHEN 500 MG
1000 TABLET ORAL EVERY 6 HOURS PRN
Status: DISCONTINUED | OUTPATIENT
Start: 2025-03-10 | End: 2025-03-12

## 2025-03-10 RX ORDER — SIMETHICONE 80 MG
80 TABLET,CHEWABLE ORAL 3 TIMES DAILY PRN
Status: DISCONTINUED | OUTPATIENT
Start: 2025-03-10 | End: 2025-03-12

## 2025-03-10 RX ORDER — BUPIVACAINE HYDROCHLORIDE 2.5 MG/ML
20 INJECTION, SOLUTION EPIDURAL; INFILTRATION; INTRACAUDAL; PERINEURAL ONCE
Status: COMPLETED | OUTPATIENT
Start: 2025-03-10 | End: 2025-03-10

## 2025-03-10 RX ORDER — DEXTROSE, SODIUM CHLORIDE, SODIUM LACTATE, POTASSIUM CHLORIDE, AND CALCIUM CHLORIDE 5; .6; .31; .03; .02 G/100ML; G/100ML; G/100ML; G/100ML; G/100ML
INJECTION, SOLUTION INTRAVENOUS CONTINUOUS
Status: DISCONTINUED | OUTPATIENT
Start: 2025-03-10 | End: 2025-03-10 | Stop reason: HOSPADM

## 2025-03-10 RX ORDER — LIDOCAINE HYDROCHLORIDE 10 MG/ML
30 INJECTION, SOLUTION EPIDURAL; INFILTRATION; INTRACAUDAL; PERINEURAL ONCE
Status: DISCONTINUED | OUTPATIENT
Start: 2025-03-10 | End: 2025-03-10 | Stop reason: HOSPADM

## 2025-03-10 RX ORDER — CITRIC ACID/SODIUM CITRATE 334-500MG
30 SOLUTION, ORAL ORAL AS NEEDED
Status: DISCONTINUED | OUTPATIENT
Start: 2025-03-10 | End: 2025-03-10 | Stop reason: HOSPADM

## 2025-03-10 RX ORDER — TERBUTALINE SULFATE 1 MG/ML
0.25 INJECTION SUBCUTANEOUS AS NEEDED
Status: DISCONTINUED | OUTPATIENT
Start: 2025-03-10 | End: 2025-03-10 | Stop reason: HOSPADM

## 2025-03-10 RX ORDER — NALBUPHINE HYDROCHLORIDE 10 MG/ML
2.5 INJECTION INTRAMUSCULAR; INTRAVENOUS; SUBCUTANEOUS
Status: DISCONTINUED | OUTPATIENT
Start: 2025-03-10 | End: 2025-03-12

## 2025-03-10 RX ORDER — IBUPROFEN 600 MG/1
600 TABLET, FILM COATED ORAL EVERY 6 HOURS
Status: DISCONTINUED | OUTPATIENT
Start: 2025-03-10 | End: 2025-03-12

## 2025-03-10 RX ORDER — BISACODYL 10 MG
10 SUPPOSITORY, RECTAL RECTAL ONCE AS NEEDED
Status: DISCONTINUED | OUTPATIENT
Start: 2025-03-10 | End: 2025-03-12

## 2025-03-10 RX ORDER — ACETAMINOPHEN 500 MG
500 TABLET ORAL EVERY 6 HOURS PRN
Status: DISCONTINUED | OUTPATIENT
Start: 2025-03-10 | End: 2025-03-12

## 2025-03-10 RX ADMIN — LIDOCAINE HYDROCHLORIDE AND EPINEPHRINE 3 ML: 15; 5 INJECTION, SOLUTION EPIDURAL at 14:52:00

## 2025-03-10 RX ADMIN — BUPIVACAINE HYDROCHLORIDE 3 ML: 2.5 INJECTION, SOLUTION EPIDURAL; INFILTRATION; INTRACAUDAL at 14:54:00

## 2025-03-10 RX ADMIN — LIDOCAINE HYDROCHLORIDE 3 ML: 10 INJECTION, SOLUTION INFILTRATION; PERINEURAL at 14:47:00

## 2025-03-10 NOTE — ANESTHESIA POSTPROCEDURE EVALUATION
Patient: Susie Herr    Procedure Summary       Date: 03/10/25 Room / Location:     Anesthesia Start: 1452 Anesthesia Stop: 1831    Procedure: LABOR ANALGESIA Diagnosis:     Scheduled Providers:  Anesthesiologist: Chapito Garcia DO    Anesthesia Type: epidural ASA Status: 2            Anesthesia Type: No value filed.    Vitals Value Taken Time   /76 03/10/25 1845   Temp 98.6 03/10/25 1847   Pulse 78 03/10/25 1845   Resp 12 03/10/25 1847   SpO2 96 % 03/10/25 1824   Vitals shown include unfiled device data.    EM AN Post Evaluation:   Patient Evaluated in floor  Patient Participation: complete - patient participated  Level of Consciousness: awake and alert  Pain Management: adequate  Airway Patency:patent  Yes    Cardiovascular Status: acceptable  Respiratory Status: acceptable  Postoperative Hydration acceptable      Chapito Garcia DO  3/10/2025 6:47 PM

## 2025-03-10 NOTE — ANESTHESIA PREPROCEDURE EVALUATION
Anesthesia PreOp Note    HPI:     Susie Herr is a 40 year old female who presents for preoperative consultation requested by: * No surgeons listed *    Date of Surgery: 3/10/2025    * No procedures listed *  Indication: * No pre-op diagnosis entered *    Relevant Problems   No relevant active problems       NPO:                         History Review:  Patient Active Problem List    Diagnosis Date Noted    AMA (advanced maternal age) multigravida 35+ ([facility]) 03/10/2025    Pregnancy ([facility]) 03/10/2025    AMA (advanced maternal age) multigravida 35+, third trimester ([facility]) 03/06/2025    Genital herpes 02/20/2025    Threatened premature labor, antepartum ([facility]) 01/23/2025    Vaginal bleeding in pregnancy, second trimester ([facility]) 11/26/2024    AMA--40 08/20/2024    Scleroderma ([facility]) 02/05/2024    Gastroesophageal reflux disease with esophagitis without hemorrhage 02/05/2024    Esophageal dysmotility 02/05/2024    Raynaud's disease without gangrene 02/05/2024    Herpes simplex 09/29/2022    Cystocele, midline 07/28/2021    Rectocele 07/28/2021    Urinary incontinence 07/28/2021    Choroid plexus cyst 12/15/2020    Dyspepsia 08/13/2019    Palpitations 03/13/2019    Systemic sclerosis with limited cutaneous involvement ([facility]) 03/13/2019    STEVEN positive 01/18/2018    Keratosis pilaris 03/16/2017       Past Medical History:    Chlamydia    Decorative tattoo    Esophagus disorder    Genital herpes simplex    Human papilloma virus infection    Miscarriage ([facility])    2-2024    Raynaud's syndrome    Scleroderma ([facility])       Past Surgical History:   Procedure Laterality Date    D & c      Egd      Other accessory      ex lap for bowel injury after appendectomy    Other surgical history  2006    apendoctomy       Prescriptions Prior to Admission[1]  Current Medications and Prescriptions Ordered in Epic[2]    Allergies[3]    Family History   Problem Relation Age of Onset    Cancer Father     Hypertension Father     Other (Other)  Maternal Grandmother     Other (Other) Maternal Aunt      Social History     Socioeconomic History    Marital status:    Tobacco Use    Smoking status: Never    Smokeless tobacco: Never   Vaping Use    Vaping status: Never Used   Substance and Sexual Activity    Alcohol use: Not Currently     Alcohol/week: 2.0 standard drinks of alcohol     Types: 2 Glasses of wine per week     Comment: occasional, prior to pregnancy    Drug use: Never    Sexual activity: Yes       Available pre-op labs reviewed.  Lab Results   Component Value Date    WBC 8.1 03/10/2025    WBC 7.4 02/20/2025    RBC 4.94 03/10/2025    RBC 4.58 02/20/2025    HGB 12.3 03/10/2025    HGB 11.6 (L) 02/20/2025    HCT 38.9 03/10/2025    HCT 37.7 02/20/2025    MCV 78.7 (L) 03/10/2025    MCV 82.3 02/20/2025    MCH 24.9 (L) 03/10/2025    MCH 25.3 (L) 02/20/2025    MCHC 31.6 03/10/2025    MCHC 30.8 (L) 02/20/2025    RDW 15.2 (H) 03/10/2025    RDW 13.6 02/20/2025    .0 03/10/2025     02/20/2025             Vital Signs:  Body mass index is 31.35 kg/m².   height is 1.6 m (5' 3\") and weight is 80.3 kg (177 lb). Her temperature is 98 °F (36.7 °C). Her blood pressure is 108/79 and her pulse is 76. Her respiration is 16.   Vitals:    03/10/25 0720 03/10/25 1145 03/10/25 1155 03/10/25 1200   BP: 121/84  108/79 108/79   Pulse: 68  76 76   Resp: 16  16    Temp: 97.9 °F (36.6 °C)  98 °F (36.7 °C)    TempSrc: Oral      Weight:  80.3 kg (177 lb)     Height:  1.6 m (5' 3\")          Anesthesia Evaluation      Airway   Mallampati: III  TM distance: >3 FB  Neck ROM: full  Dental      Pulmonary - normal exam   Cardiovascular - normal exam  (-) hypertension    Neuro/Psych      GI/Hepatic/Renal    (+) GERD    Endo/Other    (-) diabetes mellitus  Abdominal  - normal exam                 Anesthesia Plan:   ASA:  2  Plan:   Epidural  Informed Consent Plan and Risks Discussed With:  Patient  Consent Comment: 39 yo F requesting a labor epidural. She denies history  of coagulopathy, not on blood thinners, no previous back surgery, spina bifida or scoliosis. Platelets are 209.     I have informed patient of the risks of neuraxial anesthesia including, but not limited to: failure, headache, backache, hematoma, unilateral/patchy block, difficulty breathing, infection, bleeding, nerve damage, paralysis, death. The patient desires the proposed neuraxial anesthetic as planned.     All anesthetic questions answered.        I have informed Susie Herr and/or legal guardian or family member of the nature of the anesthetic plan, benefits, risks including possible dental damage if relevant, major complications, and any alternative forms of anesthetic management.   All of the patient's questions were answered to the best of my ability. The patient desires the anesthetic management as planned.  Chapito Garcia DO  3/10/2025 2:20 PM  Present on Admission:  **None**           [1]   Medications Prior to Admission   Medication Sig Dispense Refill Last Dose/Taking    valACYclovir (VALTREX) 500 MG Oral Tab Take 2 tablets (1,000 mg total) by mouth daily. 60 tablet 1 3/9/2025    OMEPRAZOLE 40 MG Oral Capsule Delayed Release TAKE ONE CAPSULE BY MOUTH TWICE DAILY BEFORE MEALS(FIRST DOSE BEFORE BREAKFAST) 180 capsule 3 3/10/2025 Morning    Calcium-Cholecalciferol (QC CALCIUM 500MG-D3) 500-5 MG-MCG Oral Tab    3/9/2025    prenatal vitamin with DHA 27-0.8-228 MG Oral Cap Take 1 capsule by mouth daily.   3/9/2025    [] oseltamivir 75 MG Oral Cap Take 1 capsule (75 mg total) by mouth 2 (two) times daily for 5 days. 10 capsule 0     albuterol 108 (90 Base) MCG/ACT Inhalation Aero Soln Inhale 2 puffs into the lungs every 4 to 6 hours as needed for Wheezing. 1 each 0     aspirin 81 MG Oral Tab EC        Loratadine 10 MG Oral Cap Take 10 mg by mouth as needed.       famotidine (PEPCID AC) 10 MG Oral Tab Take 1 tablet (10 mg total) by mouth nightly as needed.      [2]   Current  Facility-Administered Medications Ordered in Epic   Medication Dose Route Frequency Provider Last Rate Last Admin    dextrose in lactated ringers 5% infusion   Intravenous Continuous Jillian Fisher MD   Held at 03/10/25 0715    lactated ringers infusion   Intravenous PRN Jillian Fisher  mL/hr at 03/10/25 0817 New Bag at 03/10/25 0817    lactated ringers IV bolus 500 mL  500 mL Intravenous PRN Jillian Fisher MD        acetaminophen (Tylenol Extra Strength) tab 500 mg  500 mg Oral Q6H PRN Jillian Fisher MD        acetaminophen (Tylenol Extra Strength) tab 1,000 mg  1,000 mg Oral Q6H PRN Jillian Fisher MD        ibuprofen (Motrin) tab 600 mg  600 mg Oral Once PRN Jillian Fisher MD        ondansetron (Zofran) 4 MG/2ML injection 4 mg  4 mg Intravenous Q6H PRN Jillian Fisher MD        oxyTOCIN in sodium chloride 0.9% (Pitocin) 30 Units/500mL infusion premix  62.5-900 sandra-units/min Intravenous Continuous Jillian Fisher MD   Held at 03/10/25 0715    terbutaline (Brethine) 1 MG/ML injection 0.25 mg  0.25 mg Subcutaneous PRN Jillian Fisher MD        sodium citrate-citric acid (Bicitra) 500-334 MG/5ML oral solution 30 mL  30 mL Oral PRN Jillian Fisher MD        lidocaine PF (Xylocaine-MPF) 1% injection  30 mL Intradermal Once Jillian Fisher MD        ampicillin (Omnipen) 1 g in sodium chloride 0.9% 100 mL IVPB-MBP  1 g Intravenous Q4H Jillian Fisher  mL/hr at 03/10/25 1257 1 g at 03/10/25 1257    oxyTOCIN in sodium chloride 0.9% (Pitocin) 30 Units/500mL infusion premix  0.5-20 sandra-units/min Intravenous Continuous Sandra Arteaga MD 9 mL/hr at 03/10/25 1240 9 sandra-units/min at 03/10/25 1240    fentaNYL-bupivacaine 2 mcg/mL-0.125% in sodium chloride 0.9% 100 mL EPIDURAL infusion premix   Epidural Continuous Garcia, Chapito, DO        fentaNYL (Sublimaze) 50 mcg/mL injection 100 mcg  100 mcg Epidural Once Garcia, Chapito, DO        bupivacaine PF (Marcaine) 0.25% injection  20 mL Epidural Once Garcia,  DO Chapito        ePHEDrine (PF) 25 MG/5 ML injection 5 mg  5 mg Intravenous PRN Chapito Garcia DO        nalbuphine (Nubain) 10 mg/mL injection 2.5 mg  2.5 mg Intravenous Q15 Min PRN Chapito Garcia DO         No current Epic-ordered outpatient medications on file.   [3]   Allergies  Allergen Reactions    Gramineae Pollens UNKNOWN

## 2025-03-10 NOTE — PLAN OF CARE
Problem: BIRTH - VAGINAL/ SECTION  Goal: Fetal and maternal status remain reassuring during the birth process  Description: INTERVENTIONS:  - Monitor vital signs  - Monitor fetal heart rate  - Monitor uterine activity  - Monitor labor progression (vaginal delivery)  - DVT prophylaxis (C/S delivery)  - Surgical antibiotic prophylaxis (C/S delivery)  Outcome: Progressing     Problem: PAIN - ADULT  Goal: Verbalizes/displays adequate comfort level or patient's stated pain goal  Description: INTERVENTIONS:  - Encourage pt to monitor pain and request assistance  - Assess pain using appropriate pain scale  - Administer analgesics based on type and severity of pain and evaluate response  - Implement non-pharmacological measures as appropriate and evaluate response  - Consider cultural and social influences on pain and pain management  - Manage/alleviate anxiety  - Utilize distraction and/or relaxation techniques  - Monitor for opioid side effects  - Notify MD/LIP if interventions unsuccessful or patient reports new pain  - Anticipate increased pain with activity and pre-medicate as appropriate  Outcome: Progressing     Problem: ANXIETY  Goal: Will report anxiety at manageable levels  Description: INTERVENTIONS:  - Administer medication as ordered  - Teach and rehearse alternative coping skills  - Provide emotional support with 1:1 interaction with staff  Outcome: Progressing     Problem: Patient/Family Goals  Goal: Patient/Family Long Term Goal  Description: Patient's Long Term Goal: Patient's Long Term Goal: Uncomplicated Delivery     Interventions:  - Assessment/Monitoring  - Induction/Augmentation per protocol and Provider order  - C/S per protocol and Provider order   - Education  - Intervention per protocol and Provider order with education   - Involve patient in POC  - See additional Care Plan goals for specific interventions      - See additional Care Plan goals for specific interventions  Outcome:  Progressing  Goal: Patient/Family Short Term Goal  Description: Patient's Short Term Goal: Patient's Short Term Goal: Comfort and Pain Control     Interventions:   - Non Pharmacological pain intervention   - IV/IM and Epidural pain medication per Provider order and patient request  - Education  - Involve Patient in POC   - See additional Care Plan goals for specific interventions  - See additional Care Plan goals for specific interventions  Outcome: Progressing     Problem: Patient Centered Care  Goal: Patient preferences are identified and integrated in the patient's plan of care  Description: Interventions:  - What would you like us to know as we care for you? I am having a baby boy  - Provide timely, complete, and accurate information to patient/family  - Incorporate patient and family knowledge, values, beliefs, and cultural backgrounds into the planning and delivery of care  - Encourage patient/family to participate in care and decision-making at the level they choose  - Honor patient and family perspectives and choices  Outcome: Progressing

## 2025-03-10 NOTE — PROGRESS NOTES
3/10/2025, 4:57 PM    Subjective:     Feeling some pelvic pressure    Objective:  Vitals:    03/10/25 1545 03/10/25 1602 03/10/25 1624 03/10/25 1630   BP: 118/79 108/75 120/78 120/78   BP Location:       Pulse: 76 63 68 60   Resp:       Temp:       TempSrc:       SpO2: 97% 97% 96% 96%   Weight:       Height:         No intake or output data in the 24 hours ending 03/10/25 1657    Fetal heart tones:  FHT :  130 with moderate variability  Decelerations: early  Contractions: q 2-3 min   Pit 14 mu/min    Cervical Exam:  6/C/0    Assessment/Plan:  Continue present care      Sandra Arteaga MD 3/10/2025 4:57 PM

## 2025-03-10 NOTE — PROGRESS NOTES
3/10/2025, 3:27 PM    Subjective:     Comfortable with epidural    Objective:  Vitals:    03/10/25 0720 03/10/25 1145 03/10/25 1155 03/10/25 1200   BP: 121/84  108/79 108/79   BP Location: Left arm  Left arm    Pulse: 68  76 76   Resp: 16  16    Temp: 97.9 °F (36.6 °C)  98 °F (36.7 °C)    TempSrc: Oral      Weight:  177 lb (80.3 kg)     Height:  5' 3\" (1.6 m)       No intake or output data in the 24 hours ending 03/10/25 1527    Fetal heart tones:  FHT :  130 with moderate variability  Decelerations: No  Contractions: q 2-3 min  Pit 9 mu/min    Cervical Exam:  4/80/-2  AROM--clear    Assessment/Plan:  Continue sujit Arteaga MD 3/10/2025 3:27 PM

## 2025-03-10 NOTE — PROGRESS NOTES
Pt is a 40 year old female admitted to 377/377-A.     Chief Complaint   Patient presents with    Scheduled Induction      Pt is  39w0d intra-uterine pregnancy.  History obtained, consents signed. Oriented to room, staff, and plan of care.

## 2025-03-10 NOTE — DISCHARGE SUMMARY
[facility]  part of [facility]    Discharge Summary    Susie Herr Patient Status:  Inpatient    1985 MRN [id number]   Location [facility] Attending Padma Persaud MD   Hosp Day # 2       Admission Date:  3/10/2025    Discharge Date: 3/12/25    Delivering OB Clinician:  Dr Arteaga    EDC: Estimated Date of Delivery: 3/17/25    Gestational Age: 39w0d    Antepartum complications:   Patient Active Problem List   Diagnosis    Scleroderma (HCC)    Gastroesophageal reflux disease with esophagitis without hemorrhage    Esophageal dysmotility    Raynaud's disease without gangrene    AMA--40    Vaginal bleeding in pregnancy, second trimester ([facility])    Threatened premature labor, antepartum ([facility])    STEVEN positive    Choroid plexus cyst    Cystocele, midline    Dyspepsia    Herpes simplex    Keratosis pilaris    Palpitations    Rectocele    Urinary incontinence    Systemic sclerosis with limited cutaneous involvement ([facility])    Genital herpes    AMA (advanced maternal age) multigravida 35+, third trimester (HCC)    AMA (advanced maternal age) multigravida 35+ (HCC)    Pregnancy ([facility])       Date of Delivery: 3/10/2025 Time of Delivery: 6:28 PM    Delivery Type: spontaneous vaginal delivery    Baby: Liveborn male   Information for the patient's :  Eddi Herr [[id number]]   7 lb 1.2 oz (3.21 kg)  Apgars:  1 minute: 8  5 minutes: 910 minutes:        Hospital Course:  IOL for AMA.  Pitocin induction.  Left labial laceration.  Skin split by right labia repaired and perineum reapproximated with figure of 8 stitch.   No complications. Routine delivery and postpartum care    Discharge Physical Exam:   /74 (BP Location: Left arm)   Pulse 74   Temp 99.4 °F (37.4 °C) (Axillary)   Resp 16   Ht 5' 3\" (1.6 m)   Wt 177 lb (80.3 kg)   LMP 06/10/2024 (Exact Date)   SpO2 96%   Breastfeeding Yes   BMI 31.35 kg/m²   General appearance:  alert, appears stated  age, cooperative and no distress  Abdominal: soft, non-tender, no rebound  Uterus: firm, nontender, below umbilicus  Pelvic: deferred  Extremities: Homans sign is negative, no sign of DVT    Discharged Condition: stable    Disposition: home    Plan:     Follow-up appointment in 6 weeks with Dr. Arteaga

## 2025-03-10 NOTE — ANESTHESIA PROCEDURE NOTES
Labor Analgesia    Date/Time: 3/10/2025 2:47 PM    Performed by: Chapito Garcia DO  Authorized by: Chapito Garcia DO      General Information and Staff    Start Time:  3/10/2025 2:47 PM  End Time:  3/10/2025 2:52 PM  Anesthesiologist:  Chapito Garcia DO  Performed by:  Anesthesiologist  Patient Location:  OB  Reason for Block: labor epidural    Preanesthetic Checklist: patient identified, IV checked, site marked, risks and benefits discussed, monitors and equipment checked, pre-op evaluation, timeout performed, anesthesia consent and sterile technique used      Procedure Details    Patient Position:  Sitting  Prep: ChloraPrep    Monitoring:  Heart rate  Approach:  Midline    Epidural Needle    Injection Technique:  JERI saline  Needle Type:  Tuohy  Needle Gauge:  18 G  Needle Length:  3.5 in  Location:  L3-4    Spinal Needle      Catheter    Catheter Type:  Multi-orifice  Catheter Size:  20 G  Catheter at Skin Depth:  12  Test Dose:  Negative    Assessment      Additional Comments

## 2025-03-10 NOTE — H&P
[facility]  part of [facility]    History & Physical    Susie Herr Patient Status:  Inpatient    1985 MRN [id number]   Location [facility] Attending Padma Persaud MD   Hosp Day # 0 PCP No primary care provider on file.     Date of Admission:  3/10/2025    SUBJECTIVE:    Susie Herr is a 40 year old , with EDC 3/17/2025, by Last Menstrual Period giving EGA 39w0d, who is being admitted for induction of labor. IOL for AMA    Blood:  A+  rubella immune   Hep B negative   GBS positive      Problem List:   Patient Active Problem List    Diagnosis    AMA (advanced maternal age) multigravida 35+ ([facility])    Pregnancy ([facility])    AMA (advanced maternal age) multigravida 35+, third trimester ([facility])    Genital herpes     25--Genital herpes hx reported today . Valtex started as she is 36+ wk      Threatened premature labor, antepartum ([facility])     Received steroids -      Vaginal bleeding in pregnancy, second trimester ([facility])     24-- seen in triage for spotting.  No contractions  Cervix closed/thick      AMA--40     25-MFM growth 62%; cephalic   .--  gm  61%  breech  24 EFW 53%  24  Normal cervix at 36 mm.   10-28-24  Normal level 2 with unsatisfactory cervix thickness due to contraction  Close surveillance for potential PIH and flares  Monthly growth ultrasounds in the third trimester  Weekly NST by 32 weeks and sooner if the clinical situation dictates      Start bASA at 12 weeks    Options of FTS, CVS, amnio, genetic counseling, Level 1 vs level 2 u/s reviewed.    If 35-40 y/o by EDC, then [  ] u/s at 32 wks [  ] weekly NST at 36  If over 39 y/o by EDC, then [  ] MFM serial growth u/s [  ]  BPP or NST post 32 wks [  ] delivery by 39-40         Scleroderma ([facility])     See AMA plan.       Gastroesophageal reflux disease with esophagitis without hemorrhage    Esophageal dysmotility    Raynaud's disease  without gangrene    Herpes simplex     - acyclovir rx sent in      Cystocele, midline    Rectocele    Urinary incontinence    Choroid plexus cyst    Dyspepsia     Added automatically from request for surgery [id number]      Palpitations    Systemic sclerosis with limited cutaneous involvement (HCC)     Following w/ rheumatology, completed MMF treatment. H/o flare immediately postpartum after prior delivery; plans to restart meds immediately postpartum. Neg APLS, Neg SSA/SSB.  - EFW 19% on 22   [x] 28w growth   [x] 32-34w growth - 17%ile      STEVEN positive     Was being ruled out for Sjogren's per rheum.  Low Vit D.  Rpt @ NOB: 21 (deficient).  Will con't supplements      Keratosis pilaris     Lac hydrin when not trying to get pregnant ow cera ve emollients       Obstetric History:   OB History    Para Term  AB Living   7 2 2   4 2   SAB IAB Ectopic Multiple Live Births   3 1     2      # Outcome Date GA Lbr Tin/2nd Weight Sex Type Anes PTL Lv   7 Current            6 SAB 24 7w3d    SAB None     5 Term 11/15/22 37w0d  6 lb 1 oz (2.75 kg) M NORMAL SPONT EPI Y BERTHA      Birth Comments: Bleeding issue in pregnancy d/t placenta previa, that resolved   4 SAB 2022 6w0d    SAB None     3 Term 21 40w3d  6 lb 10 oz (3.005 kg) M NORMAL SPONT EPI N BERTHA      Birth Comments: Forcep delivery      Complications: Temp 100.4 or greater, Intraamniotic Infection, Meconium   2 IAB 2018 24w0d   M THERAPEUTIC Gen        Birth Comments: Had CMV   1 2017 10w0d    SAB Gen        Birth Comments: D&C     Past Medical History:   Past Medical History:    Chlamydia    Decorative tattoo    Esophagus disorder    Genital herpes simplex    Human papilloma virus infection    Miscarriage (HCC)    2-    Raynaud's syndrome    Scleroderma (HCC)     Past Social History:   Past Surgical History:   Procedure Laterality Date    D & c      Egd      Other accessory      ex lap for bowel injury after appendectomy    Other  surgical history  2006    apendoctomy     Family History:   Family History   Problem Relation Age of Onset    Cancer Father     Hypertension Father     Other (Other) Maternal Grandmother     Other (Other) Maternal Aunt      Social History:   Social History     Tobacco Use    Smoking status: Never    Smokeless tobacco: Never   Substance Use Topics    Alcohol use: Not Currently     Alcohol/week: 2.0 standard drinks of alcohol     Types: 2 Glasses of wine per week     Comment: occasional, prior to pregnancy       Home Meds: Prescriptions Prior to Admission[1]    Allergies: Allergies[2]      OBJECTIVE:    Temp:  [97.9 °F (36.6 °C)] 97.9 °F (36.6 °C)  Pulse:  [68] 68  Resp:  [16] 16  BP: (121)/(84) 121/84      General: Alert and Oriented  Abdomen: Soft, Gravid    Cervix  per RN  3/70/-2      Fetal heart tones:  Baseline 120   Fetal heart variability: moderate and reactive  Decelerations: No      Lab Review:  Results for orders placed or performed during the hospital encounter of 03/10/25   CBC With Differential With Platelet    Collection Time: 03/10/25  8:11 AM   Result Value Ref Range    WBC 8.1 4.0 - 11.0 x10(3) uL    RBC 4.94 3.80 - 5.30 x10(6)uL    HGB 12.3 12.0 - 16.0 g/dL    HCT 38.9 35.0 - 48.0 %    MCV 78.7 (L) 80.0 - 100.0 fL    MCH 24.9 (L) 26.0 - 34.0 pg    MCHC 31.6 31.0 - 37.0 g/dL    RDW-SD 42.6 35.1 - 46.3 fL    RDW 15.2 (H) 11.0 - 15.0 %    .0 150.0 - 450.0 10(3)uL    Neutrophil Absolute Prelim 5.09 1.50 - 7.70 x10 (3) uL    Neutrophil Absolute 5.09 1.50 - 7.70 x10(3) uL    Lymphocyte Absolute 2.40 1.00 - 4.00 x10(3) uL    Monocyte Absolute 0.55 0.10 - 1.00 x10(3) uL    Eosinophil Absolute 0.04 0.00 - 0.70 x10(3) uL    Basophil Absolute 0.04 0.00 - 0.20 x10(3) uL    Immature Granulocyte Absolute 0.02 0.00 - 1.00 x10(3) uL    Neutrophil % 62.5 %    Lymphocyte % 29.5 %    Monocyte % 6.8 %    Eosinophil % 0.5 %    Basophil % 0.5 %    Immature Granulocyte % 0.2 %   T Pallidum Screening Piatt     Collection Time: 03/10/25  8:11 AM   Result Value Ref Range    Treponemal Antibodies Nonreactive Nonreactive    ABORH (Blood Type)    Collection Time: 03/10/25  8:11 AM   Result Value Ref Range    ABO BLOOD TYPE A     RH BLOOD TYPE Positive    Antibody Screen    Collection Time: 03/10/25  8:11 AM   Result Value Ref Range    Antibody Screen Negative    ABORH Confirmation    Collection Time: 03/10/25  8:34 AM   Result Value Ref Range    ABO BLOOD TYPE A     RH BLOOD TYPE Positive           ASSESSMENT:    1.  AMA-40      PLAN:    Pitocin induction  GBS positive--IV antibiotics  FHTs reassuring         Sandra Arteaga MD  3/10/2025  9:56 AM        [1]   Medications Prior to Admission   Medication Sig Dispense Refill Last Dose/Taking    valACYclovir (VALTREX) 500 MG Oral Tab Take 2 tablets (1,000 mg total) by mouth daily. 60 tablet 1 3/9/2025    OMEPRAZOLE 40 MG Oral Capsule Delayed Release TAKE ONE CAPSULE BY MOUTH TWICE DAILY BEFORE MEALS(FIRST DOSE BEFORE BREAKFAST) 180 capsule 3 3/10/2025 Morning    Calcium-Cholecalciferol (QC CALCIUM 500MG-D3) 500-5 MG-MCG Oral Tab    3/9/2025    prenatal vitamin with DHA 27-0.8-228 MG Oral Cap Take 1 capsule by mouth daily.   3/9/2025    [] oseltamivir 75 MG Oral Cap Take 1 capsule (75 mg total) by mouth 2 (two) times daily for 5 days. 10 capsule 0     albuterol 108 (90 Base) MCG/ACT Inhalation Aero Soln Inhale 2 puffs into the lungs every 4 to 6 hours as needed for Wheezing. 1 each 0     aspirin 81 MG Oral Tab EC        Loratadine 10 MG Oral Cap Take 10 mg by mouth as needed.       famotidine (PEPCID AC) 10 MG Oral Tab Take 1 tablet (10 mg total) by mouth nightly as needed.      [2]   Allergies  Allergen Reactions    Gramineae Pollens UNKNOWN

## 2025-03-10 NOTE — L&D DELIVERY NOTE
[facility]  part of [facility]    Vaginal Delivery Note    Susie Herr Patient Status:  Inpatient    1985 MRN [id number]   Location [facility] Attending Padma Persaud MD   Hosp Day # 0 PCP No primary care provider on file.       Delivery     Infant  Date of Delivery: 3/10/2025   Time of Delivery: 6:28 PM  Delivery Type: Normal spontaneous vaginal delivery    Infant Sex  Information for the patient's :  Eddi Herr [[id number]]   male    Infant Birthweight  Information for the patient's :  Eddi Herr [[id number]]   7 lb 1.2 oz (3.21 kg)     Presentation Vertex [1]  Position          Apgars:  1 minute: 8               5 minutes: 9                        10 minutes:      Placenta  Date/Time of Delivery: 3/10/2025  6:31 PM   Delivery: spontaneous  Placenta to Pathology: yes  succinate lobe    Cord info:  Cord Gases Submitted: no  Cord Blood Collection: no  Cord Tissue Collection: no  Cord Complications: single nuchal      Episiotomy/Laceration Repair:  Left labial laceration repaired with 4-0 Vicryl  Skin split by right labia and perineum--skin reapproximated with 4-0 Vicryl    Maternal Anesthesia: epidural     Delivery Complications  none    Neonatologist Present: no    QBL:  163cc    Sponge and Needle Counts:  Verified    Delivery Comment:  Pushed a couple of times.  Suctioned at perineum.  OA    Nuchal cord x 1, tight cut prior to delivery of shoulders.          Sandra Arteaga MD   3/10/2025  6:48 PM

## 2025-03-11 LAB
BASOPHILS # BLD AUTO: 0.05 X10(3) UL (ref 0–0.2)
BASOPHILS NFR BLD AUTO: 0.5 %
DEPRECATED RDW RBC AUTO: 41.5 FL (ref 35.1–46.3)
EOSINOPHIL # BLD AUTO: 0.05 X10(3) UL (ref 0–0.7)
EOSINOPHIL NFR BLD AUTO: 0.5 %
ERYTHROCYTE [DISTWIDTH] IN BLOOD BY AUTOMATED COUNT: 15.3 % (ref 11–15)
HCT VFR BLD AUTO: 33 %
HGB BLD-MCNC: 10.9 G/DL
IMM GRANULOCYTES # BLD AUTO: 0.03 X10(3) UL (ref 0–1)
IMM GRANULOCYTES NFR BLD: 0.3 %
LYMPHOCYTES # BLD AUTO: 2.41 X10(3) UL (ref 1–4)
LYMPHOCYTES NFR BLD AUTO: 23 %
MCH RBC QN AUTO: 25.5 PG (ref 26–34)
MCHC RBC AUTO-ENTMCNC: 33 G/DL (ref 31–37)
MCV RBC AUTO: 77.3 FL
MONOCYTES # BLD AUTO: 0.66 X10(3) UL (ref 0.1–1)
MONOCYTES NFR BLD AUTO: 6.3 %
NEUTROPHILS # BLD AUTO: 7.26 X10 (3) UL (ref 1.5–7.7)
NEUTROPHILS # BLD AUTO: 7.26 X10(3) UL (ref 1.5–7.7)
NEUTROPHILS NFR BLD AUTO: 69.4 %
PLATELET # BLD AUTO: 178 10(3)UL (ref 150–450)
RBC # BLD AUTO: 4.27 X10(6)UL
WBC # BLD AUTO: 10.5 X10(3) UL (ref 4–11)

## 2025-03-11 RX ORDER — CALCIUM CARBONATE 500 MG/1
500 TABLET, CHEWABLE ORAL
Status: DISCONTINUED | OUTPATIENT
Start: 2025-03-11 | End: 2025-03-12

## 2025-03-11 NOTE — PLAN OF CARE
Problem: BIRTH - VAGINAL/ SECTION  Goal: Fetal and maternal status remain reassuring during the birth process  Description: INTERVENTIONS:  - Monitor vital signs  - Monitor fetal heart rate  - Monitor uterine activity  - Monitor labor progression (vaginal delivery)  - DVT prophylaxis (C/S delivery)  - Surgical antibiotic prophylaxis (C/S delivery)  Outcome: Progressing     Problem: PAIN - ADULT  Goal: Verbalizes/displays adequate comfort level or patient's stated pain goal  Description: INTERVENTIONS:  - Encourage pt to monitor pain and request assistance  - Assess pain using appropriate pain scale  - Administer analgesics based on type and severity of pain and evaluate response  - Implement non-pharmacological measures as appropriate and evaluate response  - Consider cultural and social influences on pain and pain management  - Manage/alleviate anxiety  - Utilize distraction and/or relaxation techniques  - Monitor for opioid side effects  - Notify MD/LIP if interventions unsuccessful or patient reports new pain  - Anticipate increased pain with activity and pre-medicate as appropriate  Outcome: Progressing     Problem: ANXIETY  Goal: Will report anxiety at manageable levels  Description: INTERVENTIONS:  - Administer medication as ordered  - Teach and rehearse alternative coping skills  - Provide emotional support with 1:1 interaction with staff  Outcome: Progressing     Problem: Patient/Family Goals  Goal: Patient/Family Long Term Goal  Description: Patient's Long Term Goal:     Interventions:  -   - See additional Care Plan goals for specific interventions  Outcome: Progressing  Goal: Patient/Family Short Term Goal  Description: Patient's Short Term Goal:     Interventions:   -   - See additional Care Plan goals for specific interventions  Outcome: Progressing     Problem: Patient Centered Care  Goal: Patient preferences are identified and integrated in the patient's plan of care  Description:  Interventions:  - What would you like us to know as we care for you?   - Provide timely, complete, and accurate information to patient/family  - Incorporate patient and family knowledge, values, beliefs, and cultural backgrounds into the planning and delivery of care  - Encourage patient/family to participate in care and decision-making at the level they choose  - Honor patient and family perspectives and choices  Outcome: Progressing     Problem: POSTPARTUM  Goal: Long Term Goal:Experiences normal postpartum course  Description: INTERVENTIONS:  - Assess and monitor vital signs and lab values.  - Assess fundus and lochia.  - Provide ice/sitz baths for perineum discomfort.  - Monitor healing of incision/episiotomy/laceration, and assess for signs and symptoms of infection and hematoma.  - Assess bladder function and monitor for bladder distention.  - Provide/instruct/assist with pericare as needed.  - Provide VTE prophylaxis as needed.  - Monitor bowel function.  - Encourage ambulation and provide assistance as needed.  - Assess and monitor emotional status and provide social service/psych resources as needed.  - Utilize standard precautions and use personal protective equipment as indicated. Ensure aseptic care of all intravenous lines and invasive tubes/drains.  - Obtain immunization and exposure to communicable diseases history.  Outcome: Progressing  Goal: Optimize infant feeding at the breast  Description: INTERVENTIONS:  - Initiate breast feeding within first hour after birth.   - Monitor effectiveness of current breast feeding efforts.  - Assess support systems available to mother/family.  - Identify cultural beliefs/practices regarding lactation, letdown techniques, maternal food preferences.  - Assess mother's knowledge and previous experience with breast feeding.  - Provide information as needed about early infant feeding cues (e.g., rooting, lip smacking, sucking fingers/hand) versus late cue of crying.  -  Discuss/demonstrate breast feeding aids (e.g., infant sling, nursing footstool/pillows, and breast pumps).  - Encourage mother/other family members to express feelings/concerns, and actively listen.  - Educate father/SO about benefits of breast feeding and how to manage common lactation challenges.  - Recommend avoidance of specific medications or substances incompatible with breast feeding.  - Assess and monitor for signs of nipple pain/trauma.  - Instruct and provide assistance with proper latch.  - Review techniques for milk expression (breast pumping) and storage of breast milk. Provide pumping equipment/supplies, instructions and assistance, as needed.  - Encourage rooming-in and breast feeding on demand.  - Encourage skin-to-skin contact.  - Provide LC support as needed.  - Assess for and manage engorgement.  - Provide breast feeding education handouts and information on community breast feeding support.   Outcome: Progressing  Goal: Establishment of adequate milk supply with medication/procedure interruptions  Description: INTERVENTIONS:  - Review techniques for milk expression (breast pumping).   - Provide pumping equipment/supplies, instructions, and assistance until it is safe to breastfeed infant.  Outcome: Progressing  Goal: Experiences normal breast weaning course  Description: INTERVENTIONS:  - Assess for and manage engorgement.  - Instruct on breast care.  - Provide comfort measures.  Outcome: Progressing  Goal: Appropriate maternal -  bonding  Description: INTERVENTIONS:  - Assess caregiver- interactions.  - Assess caregiver's emotional status and coping mechanisms.  - Encourage caregiver to participate in  daily care.  - Assess support systems available to mother/family.  - Provide /case management support as needed.  Outcome: Progressing

## 2025-03-11 NOTE — PROGRESS NOTES
Post-Partum Note   3/11/2025, 11:10 AM    Subjective:  Patient doing well. Crampy pain moderate  Lochia is small.  She reports she does tolerate a regular diet.  She denies headache, fever, chest pain, shortness of breath, and leg pain.  She has ambulated and denies lightheadedness.   The patient is breast feeding.   Patient had infant boy- Luke.     Objective:  Vitals:    03/10/25 2120 03/10/25 2339 25 0348 25 0827   BP: 107/72 100/71 118/69 108/57   BP Location: Left arm Left arm Left arm Left arm   Pulse: 66 63 59 63   Resp: 17 16 15 16   Temp: 97.5 °F (36.4 °C) 97.8 °F (36.6 °C) 98.2 °F (36.8 °C) 98 °F (36.7 °C)   TempSrc: Oral Oral Oral Oral   SpO2:       Weight:       Height:           Intake/Output Summary (Last 24 hours) at 3/11/2025 1110  Last data filed at 3/11/2025 0454  Gross per 24 hour   Intake 1000 ml   Output 663 ml   Net 337 ml         PE:  General: A&Ox3. NAD  Abdomen:Soft and nontender; fundus firm below umbilicus and nontender   Extremities:  no calf tenderness    Data:   Recent Labs     03/10/25  0811 25  0620   WBC 8.1 10.5   HGB 12.3 10.9*   HCT 38.9 33.0*       Assessment/Plan:  40 year oldyo  , s/p spontaneous vaginal, PPD# 1      Disposition: Continue routine PP care- Plan on AR home PP day 2    The patient had a male infant, and does desire circumcision.   She understands there is no medical indication for circumcision. We discussed AAP opinion on procedure as well. She was consented for infant circumcision risks including, but not limited to: bleeding, infection, trauma to other tissue, and need for further procedures.  The patient expressed understanding, questions were answered and she  wishes to proceed with the procedure for her son.    Dmitriy Ledezma, DO 3/11/2025 11:10 AM

## 2025-03-11 NOTE — PROGRESS NOTES
Patient received into room 349 via wheelchair. Beside report received from L&D RN Trini. Patient transferred to bed without incident. Bed in locked and low position. Side rails up x 2. VSS. Fundus firm at u/u, lochia small, no clots noted. IV site unremarkable. Baby boy present at bedside in open crib, ID bands checked and verified. Patient/family oriented to unit, room and call light. Call light within patient reach. Reinforced to patient to call for assistance before getting out of bed to bathroom. Plan of care reviewed.

## 2025-03-11 NOTE — PROGRESS NOTES
Patient up to bathroom with assist x 2. Unable to void at this time. Patient transferred to mother/baby room 349 per wheelchair in stable condition with baby and personal belongings.  Accompanied by significant other and staff.  Report given to mother/baby RN.

## 2025-03-12 VITALS
HEART RATE: 74 BPM | WEIGHT: 177 LBS | RESPIRATION RATE: 16 BRPM | BODY MASS INDEX: 31.36 KG/M2 | OXYGEN SATURATION: 96 % | TEMPERATURE: 99 F | HEIGHT: 63 IN | DIASTOLIC BLOOD PRESSURE: 74 MMHG | SYSTOLIC BLOOD PRESSURE: 117 MMHG

## 2025-03-12 NOTE — PAYOR COMM NOTE
ADMISSION REVIEW     Payor: [employer]  Subscriber #:  [id number]  Authorization Number: [id number]    Admit date: 3/10/25  Admit time:  5:13 AM       REVIEW DOCUMENTATION:  ED Provider Notes    No notes of this type exist for this encounter.         3/10 OBGYN:       Date of Admission:  3/10/2025     SUBJECTIVE:     Susie Herr is a 40 year old , with EDC 3/17/2025, by Last Menstrual Period giving EGA 39w0d, who is being admitted for induction of labor. IOL for AMA     Blood:  A+  rubella immune   Hep B negative   GBS positive      Problem List:        Patient Active Problem List     Diagnosis    AMA (advanced maternal age) multigravida 35+ (Formerly [facility])    Pregnancy ([facility])    AMA (advanced maternal age) multigravida 35+, third trimester ([facility])    Genital herpes       25--Genital herpes hx reported today . Valtex started as she is 36+ wk       Threatened premature labor, antepartum ([facility])       Received steroids -       Vaginal bleeding in pregnancy, second trimester ([facility])       24-- seen in triage for spotting.  No contractions  Cervix closed/thick       AMA--40       25-MFM growth 62%; cephalic   .--  gm  61%  breech  24 EFW 53%  24  Normal cervix at 36 mm.   10-28-24  Normal level 2 with unsatisfactory cervix thickness due to contraction  Close surveillance for potential PIH and flares  Monthly growth ultrasounds in the third trimester  Weekly NST by 32 weeks and sooner if the clinical situation dictates          Start bASA at 12 weeks     Options of FTS, CVS, amnio, genetic counseling, Level 1 vs level 2 u/s reviewed.     If 35-38 y/o by EDC, then [  ] u/s at 32 wks [  ] weekly NST at 36  If over 39 y/o by EDC, then [  ] MFM serial growth u/s [  ]  BPP or NST post 32 wks [  ] delivery by 39-40             Scleroderma ([facility])       See AMA plan.        Gastroesophageal reflux disease with esophagitis without hemorrhage    Esophageal dysmotility     Raynaud's disease without gangrene    Herpes simplex       - acyclovir rx sent in       Cystocele, midline    Rectocele    Urinary incontinence    Choroid plexus cyst    Dyspepsia       Added automatically from request for surgery [id number]         Added automatically from request for surgery [id number]        Palpitations    Systemic sclerosis with limited cutaneous involvement (HCC)       Following w/ rheumatology, completed MMF treatment. H/o flare immediately postpartum after prior delivery; plans to restart meds immediately postpartum. Neg APLS, Neg SSA/SSB.  - EFW 19% on 22   [x] 28w growth   [x] 32-34w growth - 17%ile       STEVEN positive       Was being ruled out for Sjogren's per rheum.  Low Vit D.  Rpt @ NOB: 21 (deficient).  Will con't supplements       Keratosis pilaris       Lac hydrin when not trying to get pregnant ow cera ve emollients         Obstetric History:                    OB History    Para Term  AB Living   7 2 2   4 2   SAB IAB Ectopic Multiple Live Births      3 1     2          # Outcome Date GA Lbr Tin/2nd Weight Sex Type Anes PTL Lv   7 Current                     6 SAB 24 7w3d       SAB None       5 Term 11/15/22 37w0d   6 lb 1 oz (2.75 kg) M NORMAL SPONT EPI Y LI     rm 11/15/22 37w0d   6 lb 1 oz (2.75 kg) M NORMAL SPONT EPI Y BERTHA       Birth Comments: Bleeding issue in pregnancy d/t placenta previa, that resolved   4 SAB 2022 6w0d       SAB None       3 Term 21 40w3d   6 lb 10 oz (3.005 kg) M NORMAL SPONT EPI N BERTHA      Birth Comments: Forcep delivery      Complications: Temp 100.4 or greater, Intraamniotic Infection, Meconium   2 IAB 2018 24w0d     M THERAPEUTIC Gen          Birth Comments: Had CMV   1 2017 10w0d       SAB Gen          Birth Comments: D&C       OBJECTIVE:     Temp:  [97.9 °F (36.6 °C)] 97.9 °F (36.6 °C)  Pulse:  [68] 68  Resp:  [16] 16  BP: (121)/(84) 121/84        General: Alert and Oriented  Abdomen: Soft, Gravid     Cervix  per  RN  3/70/-2        Fetal heart tones:  Baseline 120   Fetal heart variability: moderate and reactive  Decelerations: No     OBJECTIVE:     Temp:  [97.9 °F (36.6 °C)] 97.9 °F (36.6 °C)  Pulse:  [68] 68  Resp:  [16] 16  BP: (121)/(84) 121/84        General: Alert and Oriented  Abdomen: Soft, Gravid     Cervix  per RN  3/70/-2        Fetal heart tones:  Baseline 120   Fetal heart variability: moderate and reactive  Decelerations: No           ASSESSMENT:     1.  AMA-40        PLAN:     Pitocin induction  GBS positive--IV antibiotics  FHTs reassuring            Sandra Arteaga MD  3/10/2025             3/10 OBGYN:     Subjective:      Feeling some pelvic pressure     Objective:  Vitals          Vitals:     03/10/25 1545 03/10/25 1602 03/10/25 1624 03/10/25 1630   BP: 118/79 108/75 120/78 120/78   BP Location:           Pulse: 76 63 68 60   Resp:           Temp:           TempSrc:           SpO2: 97% 97% 96% 96%   Weight:           Height:                 No intake or output data in the 24 hours ending 03/10/25 1657     Fetal heart tones:  FHT :  130 with moderate variability  Decelerations: early  Contractions: q 2-3 min   Pit 14 mu/min     Cervical Exam:  6/C/0     Assessment/Plan:  Continue present care        Sandra Arteaga MD 3/10/2025 4:57 PM                  3/10 OBGYN:       Delivery      Infant  Date of Delivery: 3/10/2025   Time of Delivery: 6:28 PM  Delivery Type: Normal spontaneous vaginal delivery     Infant Sex  Information for the patient's :  Eddi Herr [[id number]]   male     Infant Birthweight  Information for the patient's :  Eddi Herr [[id number]]   7 lb 1.2 oz (3.21 kg)      Presentation Vertex [1]  Position           Apgars:  1 minute: 8               5 minutes: 9                        10 minutes:       Placenta  Date/Time of Delivery: 3/10/2025  6:31 PM   Delivery: spontaneous  Placenta to Pathology: yes  succinate lobe     Cord info:  Cord Gases Submitted: no  Cord Blood Collection:  no  Cord Tissue Collection: no  Cord Complications: single nuchal        Episiotomy/Laceration Repair:  Left labial laceration repaired with 4-0 Vicryl  Skin split by right labia and perineum--skin reapproximated with 4-0 Vicryl     Maternal Anesthesia: epidural      Delivery Complications  none     Neonatologist Present: no     QBL:  163cc     Sponge and Needle Counts:  Verified     Delivery Comment:  Pushed a couple of times.  Suctioned at perineum.  OA    Nuchal cord x 1, tight cut prior to delivery of shoulders.             Sandra Arteaga MD   3/10/2025        3/11 OBGYN:        Subjective:  Patient doing well. Crampy pain moderate  Lochia is small.  She reports she does tolerate a regular diet.  She denies headache, fever, chest pain, shortness of breath, and leg pain.  She has ambulated and denies lightheadedness.   The patient is breast feeding.   Patient had infant boy- Luke.      Objective:  Vitals          Vitals:     03/10/25 2120 03/10/25 2339 03/11/25 0348 03/11/25 0827   BP: 107/72 100/71 118/69 108/57   BP Location: Left arm Left arm Left arm Left arm   Pulse: 66 63 59 63   Resp: 17 16 15 16   Temp: 97.5 °F (36.4 °C) 97.8 °F (36.6 °C) 98.2 °F (36.8 °C) 98 °F (36.7 °             PE:  General: A&Ox3. NAD  Abdomen:Soft and nontender; fundus firm below umbilicus and nontender   Extremities:  no calf tenderness     Data:        Recent Labs     03/10/25  0811 03/11/25  0620   WBC 8.1 10.5   HGB 12.3 10.9*   HCT 38.9 33.0*     Disposition: Continue routine PP care- Plan on DC home PP day 2     The patient had a male infant, and does desire circumcision.   She understands there is no medical indication for circumcision. We discussed AAP opinion on procedure as well. She was consented for infant circumcision risks including, but not limited to: bleeding, infection, trauma to other tissue, and need for further procedures.  The patient expressed understanding, questions were answered and she  wishes to proceed with  the procedure for her son.     Dmitriy Ledezma, DO 3/11/2025 11:10 AM             MEDICATIONS ADMINISTERED IN LAST 1 DAY:  acetaminophen (Tylenol Extra Strength) tab 1,000 mg       Date Action Dose Route User    3/11/2025 1754 Given 1,000 mg Oral Eva Shepard RN                  Medications 03/03 03/04 03/05 03/06 03/07 03/08 03/09 03/10 03/11 03/12    ampicillin (Omnipen) 2 g in sodium chloride 0.9% 100 mL IVPB-MBP  Dose: 2 g  Freq: Once Route: IV  Last Dose: Stopped (03/10/25 0919)  Start: 03/10/25 0715 End: 03/10/25 0919   Admin Instructions:   *incompatible with dextrose*   Order specific questions:              0849 JOSELYN-New Bag     0919 JOSELYN-Stopped          Followed by   ampicillin (Omnipen) 1 g in sodium chloride 0.9% 100 mL IVPB-MBP  Dose: 1 g  Freq: Every 4 hours Route: IV  Last Dose: Stopped (03/10/25 1750)  Start: 03/10/25 1115 End: 03/10/25 2116   Admin Instructions:   Every 4 hours until delivery   *incompatible with dextrose*   Order specific questions:              1257 JOSELYN-New Bag     1327 JOSELYN-Stopped     1515     1721 JOSELYN-New Bag     1750 JOSELYN-Stopped     (1915 JOSELYN)-Not Given     2116-D/C'd          benzocaine-menthol (Dermoplast) 20-0.5 % topical spray 1 spray       Date Action Dose Route User    3/12/2025 0932 Given 1 spray Topical Wendy Ames RN          docusate sodium (Colace) cap 100 mg       Date Action Dose Route User    3/12/2025 0513 Given 100 mg Oral Lian Arteaga RN    3/11/2025 1754 Given 100 mg Oral Eva Shepard RN          ibuprofen (Motrin) tab 600 mg       Date Action Dose Route User    3/12/2025 1141 Given 600 mg Oral Wendy Ames RN    3/12/2025 0513 Given 600 mg Oral Lian Arteaga RN    3/11/2025 2130 Given 600 mg Oral Lian Arteaga RN            Vitals (last day)       Date/Time Temp Pulse Resp BP SpO2 Weight O2 Device O2 Flow Rate (L/min) [facility]    03/12/25 0900 99.4 °F (37.4 °C) 74 16 117/74 -- -- None (Room air) -- CB    03/11/25 2130 97.8 °F (36.6 °C) 75 17 120/76 -- --  None (Room air) -- LG    03/11/25 0827 98 °F (36.7 °C) 63 16 108/57 -- -- None (Room air) -- SG    03/11/25 0348 98.2 °F (36.8 °C) 59 15 118/69 -- -- None (Room air) -- IL       lactated ringers IV bolus 1,000 mL  Dose: 1,000 mL  Freq: Once Route: IV  Last Dose: 1,000 mL (03/10/25 1320)  Start: 03/10/25 1330 End: 03/10/25 1350   Admin Instructions:   Call anesthesia prior to bolus for preeclamptic patients            1320 JOSELYN-New Bag             fentaNYL-bupivacaine 2 mcg/mL-0.125% in sodium chloride 0.9% 100 mL EPIDURAL infusion premix  Freq: Continuous Route: EP  Last Dose: Stopped (03/10/25 1836)  Start: 03/10/25 1330 End: 03/10/25 2116   Admin Instructions:   Fentanyl 2mcg/ml, Bupivacaine 0.125% epidural in NS 100mL   Order specific questions:              1330     1454 AR-New Bag     1700 AR-Rate/Dose Change     1836 JOSELYN-Stopped     2116-D/C'd             oxyTOCIN in sodium chloride 0.9% (Pitocin) 30 Units/500mL infusion premix  Rate: 0.5-20 mL/hr Dose: 0.5-20 sandra-units/min  Freq: Continuous Route: IV  Last Dose: Stopped (03/10/25 1821)  Start: 03/10/25 1600 End: 03/10/25 2116   Admin Instructions:   Start at 2 sandra-unit/minute IV (Protocol 2 - titrate by 2 sandra-unit/minute every 15 minutes)  To achieve uterine contractions every 2 to 3 minutes with moderate to strong intensity.    May decrease or stop infusion for FHR changes with or without excessive uterine activity.  Notify provider if oxytocin is discontinued.  CAUTION: REPRODUCTIVE RISK           1600     1602 JOSELYN-Rate/Dose Change     1630 JOSELYN-Rate/Dose Change     1713 JOSELYN-Rate/Dose Change     1745 JOSELYN-Rate/Dose Change     1821 JOSELYN-Stopped     2116-D/C'd             oxyTOCIN in sodium chloride 0.9% (Pitocin) 30 Units/500mL infusion premix  Rate: 0.5-20 mL/hr Dose: 0.5-20 sandra-units/min  Freq: Continuous Route: IV  Last Dose: Stopped (03/10/25 1821)  Start: 03/10/25 0845 End: 03/10/25 1547   Admin Instructions:   Start at 1 sandra-unit/minute IV (Protocol 1 -   titrate by 1 sandra-unit/minute every 15 minutes).  To achieve uterine contractions every 2 to 3 minutes with moderate to strong intensity.    May decrease or stop infusion for uterine tachysystole with fetal heart rate changes.  CAUTION: REPRODUCTIVE RISK           0904 JOSELYN-New Bag     0957 JOSELYN-Rate/Dose Change     1035 JOSELYN-Rate/Dose Change     1054 JOSELYN-Rate/Dose Change     1115 JOSELYN-Rate/Dose Change     1136 JOSELYN-Rate/Dose Change     1155 JOSELYN-Rate/Dose Change     1220 JOSELYN-Rate/Dose Change     1240 JOSELYN-Rate/Dose Change     1530 JOSELYN-Rate/Dose Change     1547-D/C'd  1821 JOSELYN-Stopped             oxyTOCIN in sodium chloride 0.9% (Pitocin) 30 Units/500mL infusion premix  Rate: 62.5-900 mL/hr Dose: 62.5-900 sandra-units/min  Freq: Continuous Route: IV  Last Dose: 300 sandra-units/min (03/10/25 1831)  Start: 03/10/25 0715 End: 03/10/25 2116   Admin Instructions:   After delivery, titrate to control uterine bleeding.  Administer at 300 sandra-units/min for 1 hour, may increase to 900 sandra-units/min. Then 62.5 sandra-units/mi for 1 hour.  CAUTION: REPRODUCTIVE RISK           1831 JOSELYN-Rate/Dose Change     2116-D/C'd           lactated ringers infusion  Freq: As needed Route: IV  PRN Comment: OB interventions  Start: 03/10/25 0713 End: 03/10/25 2116           0817 JOSELYN-New Bag     1404 JOSELYN-New Bag     2116-D/C'd             ondansetron (Zofran) 4 MG/2ML injection 4 mg  Dose: 4 mg  Freq: Every 6 hours PRN Route: IV  PRN Reason: Nausea  Start: 03/10/25 0713 End: 03/10/25 2116           1728 JOSELYN-Given     2116-D/C'd

## 2025-03-12 NOTE — DISCHARGE INSTRUCTIONS
PAIN MEDS:     Use tylenol 1000 mg every 6 hours as needed & ibuprofen 600 mg every 6 hours as needed. Best to stagger dosing -- for example take tylenol at noon & then ibuprofen at 3 pm then tylenol again at 6 pm, etc. You can start weaning the dosing as pain improves.  If gabapentin given, only use for moderate to severe breakthrough pain.       Next Motrin at 6pm

## 2025-03-12 NOTE — LACTATION NOTE
03/12/25 1000   Evaluation Type   Evaluation Type Inpatient   Problems identified   Problems identified Knowledge deficit   Maternal history   Maternal history AMA   Other/comment Raynaud's Phenomenom   Breastfeeding goal   Breastfeeding goal To maintain breast milk feeding per patient goal   Maternal Assessment   Bilateral Breasts Soft;Symmetrical   Bilateral Nipples Everted;Erythema;Elastic   Prior BF experience: comment  2 older children, now 1yr old and 2 yr old   Breastfeeding Assistance Breastfeeding assistance provided with permission;Hand expression provided with permission;Pumping assistance provided with permission;Breast exam provided with permission   Pain assessment   Location/Comment No c/o pain   Guidelines for use of:   Breast pump type Ameda Platinum;Spectra   Current use of pump: Spectra for home.   Suggested use of pump Pump each time a supplement is offered;Pump if infant is not latching to breast   Reported pumping volumes (ml) Drops; finger fed to baby.   Other (comment) Called to assess breastfeeding r/t decreased urine output. Mom has been exclusively breastfeeding baby with feeding cues and states he has been cluster feeding. Baby showing strong feeding cues. Mom independent with latch. Baby noted to have cheek dimpling noted with suckling and more up and down jaw movements. No swallows audible. Suck evaluation done. Weak suck noted as baby not able to maintain suction with finger or pacifier. Gentle back pressure applied to pacifier and infant noted be able to maintain sucking pressure. Mom reports she does feel more chomping than pulling at the breast. Attempted hand expression into spoon for supplementation. Few drops collected and given to baby. Discussed formula supplementation. Set up breast pump and explained use, care and cleaning of pump parts. Assisted with pumping session and pressure settings. Drops collected and fed to baby. Taught father paced bottle feeding in  side-lying position. Baby tolerated well with coordinated suck, swallow, breathe. Recommend follow up with lactation outpatient clinic; info given. Discussed feeding plan for home. Mom to breastfeed and pay special attention to baby's suckling as well as baby's diaper output. Mom to pump and supplement with EBM and/or formula after breastfeeding until outpatient appointment and milk transfer can be assessed. Answered parents' questions. Parents verbalize understanding. Support and encouragement given.

## 2025-03-12 NOTE — LACTATION NOTE
This note was copied from a baby's chart.     25 1000   Evaluation Type   Evaluation Type Inpatient   Problems & Assessment   Problems Diagnosed or Identified Disorganized suck; feeding problem;Latch difficulty   Infant Assessment Oral mucous membranes dry;Skin color: pink or appropriate for ethnicity   Muscle tone Appropriate for GA   Feeding Assessment   Summary Current Feeding Adlib;Breastfeeding exclusively   Breastfeeding Assessment Assisted with breastfeeding w/mother's permission;Coordinated suck/swallow;PO supplement followed breastfeeding   Breastfeeding lasted # of minutes 15   Breastfeeding Positions cross cradle;sidelying;laid back;right breast;left breast   Latch 1   Audible Sucks/Swallows 1   Type of Nipple 2   Comfort (Breast/Nipple) 1   Hold (Positioning) 1   LATCH Score 6   Other (comment) Called to assess breastfeeding r/t decreased urine output. Mom has been exclusively breastfeeding baby with feeding cues and states he has been cluster feeding. Baby showing strong feeding cues. Mom independent with latch. Baby noted to have cheek dimpling noted with suckling and more up and down jaw movements. No swallows audible. Suck evaluation done. Weak suck noted as baby not able to maintain suction with finger or pacifier. Gentle back pressure applied to pacifier and infant noted be able to maintain sucking pressure. Mom reports she does feel more chomping than pulling at the breast. Attempted hand expression into spoon for supplementation. Few drops collected and given to baby. Discussed formula supplementation. Set up breast pump and explained use, care and cleaning of pump parts. Assisted with pumping session and pressure settings. Drops collected and fed to baby. Taught father paced bottle feeding in side-lying position. Baby tolerated well with coordinated suck, swallow, breathe. Recommend follow up with lactation outpatient clinic; info given. Discussed feeding plan for home. Mom to  breastfeed and pay special attention to baby's suckling as well as baby's diaper output. Mom to pump and supplement with EBM and/or formula after breastfeeding until outpatient appointment and milk transfer can be assessed. Answered parents' questions. Parents verbalize understanding. Support and encouragement given.   Output   # Voids in 24 hours 0   Pre/Post Weights   Supplement Type Formula   Supplement Type (other) Formula   Supplement total, ml 30   Equipment used   Equipment used Bottle with slow flow nipple

## 2025-03-16 ENCOUNTER — NST DOCUMENTATION (OUTPATIENT)
Dept: OBGYN CLINIC | Facility: CLINIC | Age: 40
End: 2025-03-16

## 2025-03-18 ENCOUNTER — NURSE ONLY (OUTPATIENT)
Dept: LACTATION | Facility: HOSPITAL | Age: 40
End: 2025-03-18
Payer: COMMERCIAL

## 2025-03-18 ENCOUNTER — LACTATION ENCOUNTER (OUTPATIENT)
Dept: LACTATION | Facility: HOSPITAL | Age: 40
End: 2025-03-18

## 2025-03-18 DIAGNOSIS — O92.29 SORE NIPPLES DUE TO LACTATION (HCC): ICD-10-CM

## 2025-03-18 PROCEDURE — 99212 OFFICE O/P EST SF 10 MIN: CPT

## 2025-03-18 NOTE — LACTATION NOTE
This note was copied from a baby's chart.     03/18/25 0043   Evaluation Type   Evaluation Type Outpatient Initial   Problems & Assessment   Problems: comment/detail Susie presents with 8 day old Obed for initial lactation OP visit to evaluate breastfeeding due to concerns following delivery of a weaker seal/suck feeding pattern and cheek dimpling with feeding, hx of supplementation due to jaundice, exclusively breastfeeding for last 5 days. 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 Current naked weight is 7 lb 6.5 oz, BW 7 lb 1.2 oz. Upon assessment, weaker seal with digital suck assessment, suspected upper lip and posterior tongue restriction, adequate visual mobility of tongue, pre and post feeding: absent lip blister(s), absent milk tongue, absent lip blanching, absent coughing/choking/gulping at home or this feeding assessment. Assisted with feeding observation.   Infant Assessment Anterior fontanel soft and flat;Hunger cues present;Skin color: pink or appropriate for ethnicity   Muscle tone Appropriate for GA   Feeding Assessment   Summary Current Feeding Adlib;Breastfeeding exclusively   Breastfeeding Assessment Assisted with breastfeeding w/mother's permission;Calm and ready to breastfeed;Deep latch achieved and observed   Breastfeeding lasted # of minutes 20   Breastfeeding Positions cross cradle;laid back;left breast;right breast   Latch 1   Audible Sucks/Swallows 2   Type of Nipple 2   Comfort (Breast/Nipple) 2   Hold (Positioning) 1   LATCH Score 8   Other (comment) Observed frequent on/off breast in cradle hold, sustains latch well with support and occasional need for breast compressions.  coordinated suck/swallow patterns identified when able to sustain latch. Fatigued quickly following 72 ml milk transfer. Susie reports feeling empty/comfortable following feedings. Demonstrated laidback position with minor improvement in maintaining seal. Mild leaking on left breast (second breast offered) with initial letdown. Feeding well tolerated with minimal cheek dimpling, absent visual/audible stress cues in response to letdown. Encouraged finish first breast prior to offering second breast, provide necessary support to improve/maintain seal at breast, indications for nipple shield use with weaker seal/frequent on/off breast, importance of tummy time, identification of soothing vs hunger cues. Follow up 4/2/25 @ 11am to evaluate milk transfer at 3+ weeks of age if needed.   Output   # Voids in 24 hours WNL   # Stools in 24 hours WNL   # Emesis in 24 hours denies   Pre/Post Weights   Pre-Weight Right Breast (g) 3360   Post-Weight Right Breast (g) 3394   ml of milk, RT Brst 34   Pre-Weight Left Breast (g) 3394   Post-Weight Left Breast (g) 3432   ml of milk, LT Brst 38   ml of milk, total 72   Supplement Type (other) Not indicated

## 2025-03-18 NOTE — LACTATION NOTE
03/18/25 1115   Evaluation Type   Evaluation Type Outpatient Initial   Problems identified   Problems identified Milk supply WNL;Nipple pain   Problems Identified Other Susie presents with 8 day old Obed for initial lactation OP visit to evaluate breastfeeding due to concerns following delivery of a weaker seal/suck feeding pattern and cheek dimpling with feeding, hx of supplementation due to jaundice, exclusively breastfeeding for last 5 days. 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 Current naked weight is 7 lb 6.5 oz, BW 7 lb 1.2 oz. Upon assessment, weaker seal with digital suck assessment, suspected upper lip and posterior tongue restriction, adequate visual mobility of tongue, pre and post feeding: absent lip blister(s), absent milk tongue, absent lip blanching, absent coughing/choking/gulping at home or this feeding assessment. Assisted with feeding observation.   Maternal history   Maternal history AMA   Breastfeeding goal   Breastfeeding goal To maintain breast milk feeding per patient goal   Maternal Assessment   Bilateral Breasts Soft;Symmetrical   Bilateral Nipples WNL   Prior BF experience: comment  2 older children, now 2yr old (4 months) and 4 yr old (9 months)   Breastfeeding Assistance Breastfeeding assistance provided with permission;Breast exam provided with permission   Pain assessment   Pain, additional Pain location   Pain Location Nipples   Location/Comment related to cluster feeding at night   Treatment of Sore Nipples Deeper latch techniques;Expressed breast milk   Guidelines for use of:   Equipment Nipple shield   Breast pump type Spectra   Current use of pump: has  pumped twice following a breastfeeding session since exclusive breastfeeding started (5 days now)   Suggested use of pump Avoid overstimulation of milk supply;For comfort as needed;Pump each time a supplement is offered;Pump if infant is not latching to breast   Reported pumping volumes (ml) 20-30 ml 30 minutes post breastfeeding   Post-feed pumped volume not evaluated, infant transferred 72 ml.   Other (comment) see pt instructions

## 2025-03-18 NOTE — PATIENT INSTRUCTIONS
[facility]  Kalpana Ramirez RN, BSN, IBCLC  [phone]      Birth Weight: 7 lb 1.2 oz  Today's Naked Weight: 7 lb 6.5 oz, very well done at day 8 with exclusive breastfeeding for past 5 days!!         Obed transferred 72 ml from breast today (34 right, 38 left). A summary of topics we discussed today is below the feeding plan developed today.     FEEDING PLAN    Breastfeeding frequency: Continue to breastfeed with each feeding, consider continued breast support if sleepy at breast, frequent on/off latch. Finish first breast prior to offering second breast to assure proper composition of milk at feeding. Differentiate hunger cues from soothing needs. Offer tummy time moments to accommodate stretching/strengthening through movement (can be achieved post feeding upright in prone position on chest pre or post feedings). Make the best of 20-30 minutes (+/-) when feeding at breast, apply breast compressions and provide gentle stimulation as needed to encourage efficiency at breast.    Supplementation: Not currently indicated. If a bottle in lieu of breastfeeding is offered, 2-2.5 oz is appropriate at this time. See paced bottle feeding below if supplementation by bottle is indicated.    Pumping: Avoid unnecessary pumping sessions. Pump breasts as needed for comfort or if a bottle is given for any reason to protect milk supply. Adjust pump settings: increase vacuum/suction to maximum level that is comfortably tolerated ~ adjust stimulation mode/expression mode according to milk release.     Follow up: With Pediatrician as directed. With lactation 4/2/25 @ 11 am to evaluate breastfeeding performance if concerns related to cluster feeding persist/worsen. Refer to additional support groups/resources below.          ADDITIONAL INFORMATION:     Snuggle your baby in skin to skin contact between and during feedings whenever possible.    Massage your breasts before nursing or pumping to soften areola if  needed.    Breastfeed with hunger cues: Most babies will breastfeed 8-12 times every 24 hours with some clustered breastfeeding, especially during growth spurts.     Positioning:   Baby facing mom with mouth at nipple level. Bring infant to the breast not the breast to the infant.  Make sure infant is fully turned towards you with head aligned with the shoulders for latch and arms hugging you.  Baby should approach breast reaching up with the chin lifted.  Chin is deep into the breast and nose is slightly away from breast after latch.  Make small adjustments in position for your comfort and to help make space for the infant's nose if needed.    Deep Latching on:  Express drops of milk onto your baby’s lips to encourage latching if needed.  Aim your nipple to baby’s nose  Wait for a wide mouth and push your nipple in the mouth as you bring infant fully onto the breast.  Observe for mouth \"planted\" on the breast and for good jaw movement with suck.    Nipple shield: Use if infant unable to maintain latch or nipple(s) are too sore to latch   without a shield:   Use nipple shield (Medela 20mm)   Check for swallowing with most sucks until satisfied.   Read nipple shield instructions.  Have weight checked within the first week of its use and if adequate number of wet/stool diapers for age is not witnessed.     Is baby taking enough breast milk?  Swallowing with most sucks (every 1-3 sucks) until satisfied at least 8-12 times every 24 hours.  Compressing the breast when your baby sucks can increase milk flow.  At least 6-8 wet diapers and at least 3-4 soft, yellow seedy stools every 24 hours. Use the breastfeeding journal to keep a record.   Weight gain of at least 5-7 ounces per week for the first 3 months after return to birth weight.    Supplement when:    Breastfeeding session does not meet adequate feeding guidelines above.  Your baby's doctor has advised that supplementation is needed.  Use your expressed breast milk  as the supplement or formula if breast milk does not meet volume infant requires.    Hour of Age  Intake (mL/feed)  1st 24 hours 2-10  24-48 hours 5-15  48-72 hours 15-30  72-96 hours 30-60  Day 10: 2-3 ounces per feeding.  4 weeks: 3-4 ounces or more per feeding.    Paced bottle feeding using a slow flow nipple:     Hold your baby in an upright position, supporting the hand and neck with your hand, rather than in the crook of your arm.   Let your baby “latch on” to the bottle: stroke nipple down from top lip to bottom, licking is good, wait for wide mouth and insert nipple with lips on base.  Angle the bottle so flow is slower. If the bottle is vertical milk will flow to quickly.  Pausing mimics breastfeeding and discourages “guzzling” the feeding.      Do I need to pump my breasts?  If supplementing:  Pump both breasts each time a supplement is given until infant nursing well.  Pump for 10-15 minutes using double electric breast pump.  Save all expressed breast milk for your infant.    Remember the helpful website to improve your production that helps https://[email or web address]/[email or web address]-education/breastfeeding/[email or web address]-milk-production.html    Breastfeeding Journal:  Write down your baby’s feedings and diapers - if not meeting the guideline for number of diapers or feedings, call your baby’s doctor.      Follow up with your OB healthcare provider:  Call if a plugged duct or engorgement persists greater than 48 hours. Call if firm or reddened spots are present in breast with signs of fever, chills or flu like symptoms (possible breast infection/mastitis). Check your temperature during engorgement.      Care for nipples until healed:     Express drops of breast milk on nipples before and after nursing (unless nipple thrush is suspected or present).  Use a hydrogel type dressing on your nipples between feedings. (Soothies or Ameda Comfort Gel pads)  Or, use Lanolin every time after breastfeeding. (Do not  combine with use of gel pads)  If too sore to nurse on one or both breasts, pump one (or both) breast(s) to comfort every 2-3 hours. If nursing to contentment on one breast, this pumped milk can be stored for future use. If not nursing on either breast, feed baby your breast milk until able to return to breast.   Discuss use of all purpose nipple ointment with your OB doctor.   Call doctor if nipple has signs of infection: red/deep pink, drainage (pus), increased pain, fever.         Call your OB doctor with any signs of   mastitis (breast infection)    Plugged area(s) are not soft within 24 hours.   Breast becomes firm, reddened, or painful.   Fever, chills, or flu-like symptoms. Check your temperature 3 times daily until a plugged duct or mastitis resolves.    If mastitis occurs:  Continue breastfeeding and/or pumping - your milk is not infected.   Continue above treatment for relieving plugged area(s)  Continue to take antibiotic as prescribed even though you may quickly feel better.   Contact your doctor is you are not feeling significantly better within 1-2 days of starting antibiotic, sooner if symptoms worsen.      Call the lactation consultants at [phone] as needed.                 Additional recommendations can be made on an individual basis depending on needs.     [facility] has great support for our families even after discharge.  We have virtual or in-person support groups.  Visit our website for the most up-to-date info for our many different support groups. [email or web address]       Outpatient Lactation appoints.  Call [phone]- to schedule an appt.  Our office is located in the Maternal Fetal Medicine office next to [facility] on the first floor.      New Moms Support Groups  Our weekly New Mom Support Groups are for any new parents in our community. They are led by an experienced Mother/Baby nurse or IBCLC and usually include a guest speaker on  a topic of interest to new parents. These in-person groups also include Breastfeeding Support at each meeting. Bring your baby ( - 6 months) with you! Moms-to-be are also welcome! All mom's welcome even if its not your first.     MOM & BABY HOUR   Meets most  10:00 - 11:30 a.m.  Masks are not required, but be considerate of others and do not attend if mom or baby have had any symptoms of illness within the previous 24 hours. Breastfeeding support will be included at each session--just ask the leader any breastfeeding questions you may have. Location [facility] [place] [address] [place] Go inside the front door and to the right to the “Community Education Room”.    Mom's Line: [phone]   This service is provided by [facility], has a phone line dedicated women (or anyone worried about a women) who may be experiencing signs or symptoms of postpartum depression.    Nurturing Mom- A support group for new and expectant moms looking for support with the transition to parenthood as well as those experiencing symptoms of  anxiety and/or depression.  Please contact [email or web address] if you need directions or the link for the virtual meetings. Please contact [email or web address] if you plan to attend, but please be considerate of others and do not attend if mom or baby have had any symptoms of illness within the previous 24 hours.     La Leche League for breast feeding and parent support, Website: IIIus.org  and for the [place] group and other groups visit [email or web address]/[email or web address]  to help find a group, all meetings are virtual.     Facebook groups-  for more support when home- [facility] --- you can find mom-to-mom advice and the list of speaker topics for cradle talk program.     Helpful websites:     www.llli.org  www.[email or web address]  www.[email or web address].org

## 2025-03-19 ENCOUNTER — HOSPITAL ENCOUNTER (EMERGENCY)
Facility: HOSPITAL | Age: 40
Discharge: HOME | End: 2025-03-19
Attending: EMERGENCY MEDICINE
Payer: COMMERCIAL

## 2025-03-19 ENCOUNTER — TELEPHONE (OUTPATIENT)
Dept: OBGYN CLINIC | Facility: CLINIC | Age: 40
End: 2025-03-19

## 2025-03-19 ENCOUNTER — APPOINTMENT (OUTPATIENT)
Dept: CT IMAGING | Facility: HOSPITAL | Age: 40
End: 2025-03-19
Attending: EMERGENCY MEDICINE
Payer: COMMERCIAL

## 2025-03-19 VITALS
HEIGHT: 63 IN | OXYGEN SATURATION: 96 % | DIASTOLIC BLOOD PRESSURE: 74 MMHG | RESPIRATION RATE: 15 BRPM | TEMPERATURE: 99 F | SYSTOLIC BLOOD PRESSURE: 110 MMHG | HEART RATE: 97 BPM | WEIGHT: 150 LBS | BODY MASS INDEX: 26.58 KG/M2

## 2025-03-19 DIAGNOSIS — R00.2 PALPITATIONS: Primary | ICD-10-CM

## 2025-03-19 LAB
ANION GAP SERPL CALC-SCNC: 10 MMOL/L (ref 0–18)
ATRIAL RATE: 102 BPM
BASOPHILS # BLD AUTO: 0.02 X10(3) UL (ref 0–0.2)
BASOPHILS NFR BLD AUTO: 0.2 %
BUN BLD-MCNC: 19 MG/DL (ref 9–23)
BUN/CREAT SERPL: 22.6 (ref 10–20)
CALCIUM BLD-MCNC: 8.2 MG/DL (ref 8.7–10.4)
CHLORIDE SERPL-SCNC: 111 MMOL/L (ref 98–112)
CO2 SERPL-SCNC: 20 MMOL/L (ref 21–32)
CREAT BLD-MCNC: 0.84 MG/DL
D DIMER PPP FEU-MCNC: 1.85 UG/ML FEU (ref ?–0.5)
DEPRECATED RDW RBC AUTO: 45.2 FL (ref 35.1–46.3)
EGFRCR SERPLBLD CKD-EPI 2021: 90 ML/MIN/1.73M2 (ref 60–?)
EOSINOPHIL # BLD AUTO: 0.01 X10(3) UL (ref 0–0.7)
EOSINOPHIL NFR BLD AUTO: 0.1 %
ERYTHROCYTE [DISTWIDTH] IN BLOOD BY AUTOMATED COUNT: 15.9 % (ref 11–15)
GLUCOSE BLD-MCNC: 99 MG/DL (ref 70–99)
HCT VFR BLD AUTO: 41.2 %
HGB BLD-MCNC: 13.3 G/DL
IMM GRANULOCYTES # BLD AUTO: 0.02 X10(3) UL (ref 0–1)
IMM GRANULOCYTES NFR BLD: 0.2 %
LYMPHOCYTES # BLD AUTO: 0.9 X10(3) UL (ref 1–4)
LYMPHOCYTES NFR BLD AUTO: 9.1 %
MCH RBC QN AUTO: 25.6 PG (ref 26–34)
MCHC RBC AUTO-ENTMCNC: 32.3 G/DL (ref 31–37)
MCV RBC AUTO: 79.4 FL
MONOCYTES # BLD AUTO: 0.06 X10(3) UL (ref 0.1–1)
MONOCYTES NFR BLD AUTO: 0.6 %
NEUTROPHILS # BLD AUTO: 8.88 X10 (3) UL (ref 1.5–7.7)
NEUTROPHILS # BLD AUTO: 8.88 X10(3) UL (ref 1.5–7.7)
NEUTROPHILS NFR BLD AUTO: 89.8 %
OSMOLALITY SERPL CALC.SUM OF ELEC: 294 MOSM/KG (ref 275–295)
P AXIS: 60 DEGREES
P-R INTERVAL: 142 MS
PLATELET # BLD AUTO: 317 10(3)UL (ref 150–450)
POTASSIUM SERPL-SCNC: 3.7 MMOL/L (ref 3.5–5.1)
Q-T INTERVAL: 314 MS
QRS DURATION: 92 MS
QTC CALCULATION (BEZET): 409 MS
R AXIS: -9 DEGREES
RBC # BLD AUTO: 5.19 X10(6)UL
SODIUM SERPL-SCNC: 141 MMOL/L (ref 136–145)
T AXIS: 53 DEGREES
VENTRICULAR RATE: 102 BPM
WBC # BLD AUTO: 9.9 X10(3) UL (ref 4–11)

## 2025-03-19 PROCEDURE — 71260 CT THORAX DX C+: CPT | Performed by: EMERGENCY MEDICINE

## 2025-03-19 PROCEDURE — 96361 HYDRATE IV INFUSION ADD-ON: CPT

## 2025-03-19 PROCEDURE — 93010 ELECTROCARDIOGRAM REPORT: CPT

## 2025-03-19 PROCEDURE — 96360 HYDRATION IV INFUSION INIT: CPT

## 2025-03-19 PROCEDURE — 85379 FIBRIN DEGRADATION QUANT: CPT | Performed by: EMERGENCY MEDICINE

## 2025-03-19 PROCEDURE — 99284 EMERGENCY DEPT VISIT MOD MDM: CPT

## 2025-03-19 PROCEDURE — 99285 EMERGENCY DEPT VISIT HI MDM: CPT

## 2025-03-19 PROCEDURE — 85025 COMPLETE CBC W/AUTO DIFF WBC: CPT | Performed by: EMERGENCY MEDICINE

## 2025-03-19 PROCEDURE — 93005 ELECTROCARDIOGRAM TRACING: CPT

## 2025-03-19 PROCEDURE — 80048 BASIC METABOLIC PNL TOTAL CA: CPT | Performed by: EMERGENCY MEDICINE

## 2025-03-19 NOTE — ED INITIAL ASSESSMENT (HPI)
Pt presents to the ER with c/o palpitations, nausea, HA and ANDREA x 1 hour PTA. Pt recently had vaginal delivery on 3/10/25. Per pt uncomplicated delivery

## 2025-03-19 NOTE — ED PROVIDER NOTES
Patient Seen in: [facility] Emergency Department      History     Chief Complaint   Patient presents with    Difficulty Breathing    Arrythmia/Palpitations     Stated Complaint: SOB and palpitations after delivery last week    Subjective:   HPI      Patient is a 40-year-old female had an uneventful vaginal delivery on the 10th of this month.  Today she was sitting nursing her baby had a sudden onset of facial flushing her eyes became red and teary she felt a little short of breath.  She is felt her heart racing.  She states she had a mild pressure in her head not really a headache.  No vision changes no abdominal pain.  Her symptoms lasted about 40 minutes and then gradually resolved she feels well now.  She called her OB/GYN and was told to come to the emergency room    Objective:     Past Medical History:    Chlamydia    Decorative tattoo    Esophagus disorder    Genital herpes simplex    Human papilloma virus infection    Miscarriage ([facility])    2-2024    Raynaud's syndrome    Scleroderma ([facility])              Past Surgical History:   Procedure Laterality Date    D & c      Egd      Other accessory      ex lap for bowel injury after appendectomy    Other surgical history  2006    apendoctomy                Social History     Socioeconomic History    Marital status:    Tobacco Use    Smoking status: Never    Smokeless tobacco: Never   Vaping Use    Vaping status: Never Used   Substance and Sexual Activity    Alcohol use: Not Currently     Alcohol/week: 2.0 standard drinks of alcohol     Types: 2 Glasses of wine per week     Comment: occasional, prior to pregnancy    Drug use: Never    Sexual activity: Yes     Social Drivers of Health     Food Insecurity: No Food Insecurity (3/10/2025)    NCSS - Food Insecurity     Worried About Running Out of Food in the Last Year: No     Ran Out of Food in the Last Year: No   Transportation Needs: No Transportation Needs (3/10/2025)    NCSS - Transportation     Lack of  Transportation: No   Stress: No Stress Concern Present (3/10/2025)    Stress     Feeling of Stress : No   Housing Stability: Not At Risk (3/10/2025)    NCSS - Housing/Utilities     Has Housing: Yes     Worried About Losing Housing: No     Unable to Get Utilities: No                  Physical Exam     ED Triage Vitals   BP 03/19/25 1502 111/79   Pulse 03/19/25 1502 107   Resp 03/19/25 1458 18   Temp 03/19/25 1458 98.7 °F (37.1 °C)   Temp src 03/19/25 1458 Temporal   SpO2 03/19/25 1502 97 %   O2 Device 03/19/25 1502 None (Room air)       Current Vitals:   Vital Signs  BP: 117/70  Pulse: 109  Resp: 17  Temp: 98.7 °F (37.1 °C)  Temp src: Temporal  MAP (mmHg): 83    Oxygen Therapy  SpO2: 96 %  O2 Device: None (Room air)        Physical Exam  Constitutional: Oriented to person, place, and time. Appears well-developed and well-nourished.   HEENT:   Head: Normocephalic and atraumatic.   Right Ear: External ear normal.   Left Ear: External ear normal.   Nose: Nose normal.   Mouth/Throat: Oropharynx is clear and moist.   Eyes: Conjunctivae and EOM are normal. Pupils are equal, round, and reactive to light.   Neck: Neck supple.   Cardiovascular: Normal rate, regular rhythm, normal heart sounds and intact distal pulses.    Pulmonary/Chest: Effort normal and breath sounds normal. No respiratory distress.   Abdominal: Soft. Bowel sounds are normal. Exhibits no distension and no mass. There is no tenderness. There is no rebound and no guarding.   Musculoskeletal: Normal range of motion. Exhibits no edema or tenderness.   Lymphadenopathy: No cervical adenopathy.   Neurological: Alert and oriented to person, place, and time. Normal reflexes. No cranial nerve deficit. No motor os sensory defecits noted Coordination normal.   Skin: Skin is warm and dry.   Psychiatric: Normal mood and affect. Behavior is normal. Judgment and thought content normal.   Nursing note and vitals reviewed.        ED Course     Labs Reviewed   BASIC METABOLIC  PANEL (8) - Abnormal; Notable for the following components:       Result Value    CO2 20.0 (*)     BUN/CREA Ratio 22.6 (*)     Calcium, Total 8.2 (*)     All other components within normal limits   CBC WITH DIFFERENTIAL WITH PLATELET - Abnormal; Notable for the following components:    MCV 79.4 (*)     MCH 25.6 (*)     RDW 15.9 (*)     Neutrophil Absolute Prelim 8.88 (*)     Neutrophil Absolute 8.88 (*)     Lymphocyte Absolute 0.90 (*)     Monocyte Absolute 0.06 (*)     All other components within normal limits   D-DIMER - Abnormal; Notable for the following components:    D-Dimer 1.85 (*)     All other components within normal limits     EKG    Rate, intervals and axes as noted on EKG Report.  Rate: 102  Rhythm: Sinus Rhythm  Reading: Sinus rhythm no acute ST-T wave                       MDM      Use of independent historian:     I personally reviewed and interpreted the images : No evidence of PE on CT scan    CT CHEST PE AORTA (IV ONLY) (CPT=71260)    Result Date: 3/19/2025  CONCLUSION:  1. No pulmonary embolus. 2. Gaseous distension of the esophagus with small amount of fluid in the mid and distal esophagus probably related to gastroesophageal reflux.    Dictated by (CST): Vinod Louis MD on 3/19/2025 at 5:49 PM     Finalized by (CST): Vinod Louis MD on 3/19/2025 at 5:55 PM           Vitals:    03/19/25 1502 03/19/25 1600 03/19/25 1645 03/19/25 1700   BP: 111/79 115/74 104/86 117/70   Pulse: 107 117 106 109   Resp:  18 19 17   Temp:       TempSrc:       SpO2: 97% 96% 95% 96%   Weight:       Height:         *I personally reviewed and interpreted all ED vitals.    Pulse Ox: 97%, Room air, Normal     EKG interpretation above independently interpreted by me    Monitor Interpretation:   normal sinus rhythm independently interpreted by me    Differential Diagnosis/ Diagnostic Considerations: Patient with episode of shortness of breath facial flushing and mild headache while nursing her 10-day-old baby.  Now  seemingly better.  Consider anemia consider vagal reaction consider arrhythmia doubt PE blood pressures been stable    Medical Record Review: I personally reviewed available prior medical records for any recent pertinent discharge summaries, testing, and procedures and reviewed those reports and found hemoglobin was 10.9 on 3/11/2025.    Complicating Factors: The patient already has recent vaginal delivery sarcoid and swallowing issue which contribute to the complexity of this ED evaluation.    Social determinants of health:    Prescription drug management:      Shared Decision Making:    ED Course: Workup findings discussed with patient.  She remained stable and essentially asymptomatic her heart rate is in the 90s.  Will discharge home with close outpatient follow-up plan she agrees    Discussion of management with other healthcare providers:    Condition upon leaving the department: Stable          Medical Decision Making      Disposition and Plan     Clinical Impression:  1. Palpitations         Disposition:  Discharge  3/19/2025  6:05 pm    Follow-up:  Sandra Arteaga MD  [address] [place] [id number]  [phone]    Follow up in 2 day(s)            Medications Prescribed:  Current Discharge Medication List              Supplementary Documentation:

## 2025-03-19 NOTE — PAYOR COMM NOTE
--------------  DISCHARGE REVIEW    Payor: [employer]  Subscriber #:  [id number]  Authorization Number: [id number]    Admit date: 3/10/25  Admit time:   5:13 AM  Discharge Date: 3/12/2025  4:17 PM     Admitting Physician: Padma Persaud MD  Attending Physician:  No att. providers found  Primary Care Physician: No primary care provider on file.          Discharge Summary Notes        Discharge Summary signed by Rosenda Choi MD at 3/12/2025  1:23 PM       Author: Rosenda Choi MD Specialty: OBSTETRICS & GYNECOLOGY Author Type: Physician    Filed: 3/12/2025  1:23 PM Date of Service: 3/12/2025  1:22 PM Status: Signed    : Rosenda Choi MD (Physician)         [facility]  part of [facility]    Discharge Summary    Susie Herr Patient Status:  Inpatient    1985 MRN [id number]   Location [facility] Attending Padma Persaud MD   Hosp Day # 2       Admission Date:  3/10/2025    Discharge Date: 3/12/25    Delivering OB Clinician:  Dr Arteaga    EDC: Estimated Date of Delivery: 3/17/25    Gestational Age: 39w0d    Antepartum complications:   Patient Active Problem List   Diagnosis    Scleroderma ([facility])    Gastroesophageal reflux disease with esophagitis without hemorrhage    Esophageal dysmotility    Raynaud's disease without gangrene    AMA--40    Vaginal bleeding in pregnancy, second trimester ([facility])    Threatened premature labor, antepartum ([facility])    STEVEN positive    Choroid plexus cyst    Cystocele, midline    Dyspepsia    Herpes simplex    Keratosis pilaris    Palpitations    Rectocele    Urinary incontinence    Systemic sclerosis with limited cutaneous involvement ([facility])    Genital herpes    AMA (advanced maternal age) multigravida 35+, third trimester (HCC)    AMA (advanced maternal age) multigravida 35+ (HCC)    Pregnancy ([facility])       Date of Delivery: 3/10/2025 Time of Delivery: 6:28 PM    Delivery Type: spontaneous vaginal  delivery    Baby: Liveborn male   Information for the patient's :  Eddi Herr [[id number]]   7 lb 1.2 oz (3.21 kg)  Apgars:  1 minute: 8  5 minutes: 910 minutes:        Hospital Course:  IOL for AMA.  Pitocin induction.  Left labial laceration.  Skin split by right labia repaired and perineum reapproximated with figure of 8 stitch.   No complications. Routine delivery and postpartum care    Discharge Physical Exam:   /74 (BP Location: Left arm)   Pulse 74   Temp 99.4 °F (37.4 °C) (Axillary)   Resp 16   Ht 5' 3\" (1.6 m)   Wt 177 lb (80.3 kg)   LMP 06/10/2024 (Exact Date)   SpO2 96%   Breastfeeding Yes   BMI 31.35 kg/m²   General appearance:  alert, appears stated age, cooperative and no distress  Abdominal: soft, non-tender, no rebound  Uterus: firm, nontender, below umbilicus  Pelvic: deferred  Extremities: Homans sign is negative, no sign of DVT    Discharged Condition: stable    Disposition: home    Plan:     Follow-up appointment in 6 weeks with Dr. Arteaga       Electronically signed by Rosenda Choi MD on 3/12/2025  1:23 PM         REVIEWER COMMENTS

## 2025-03-19 NOTE — TELEPHONE ENCOUNTER
Kristyn calling with concern for symptoms.  She delivered 3/10/25 with Dr. Arteaga.    She reports onset of shortness of breath about 20 mins ago with facial flushing and red eyes. She states feeling woozy. She notes head pressure, but not really HA.   Denies CP, vision change or RUQ pain. She is feeling a little nauseated.     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 135/91 then 120/80 on recheck.   Directed to ER now for evaluation. Patient verbalized understanding.     To Dr. Ramirez as GREG.

## 2025-03-24 ENCOUNTER — TELEPHONE (OUTPATIENT)
Dept: OBGYN UNIT | Facility: HOSPITAL | Age: 40
End: 2025-03-24

## 2025-04-08 ENCOUNTER — OFFICE VISIT (OUTPATIENT)
Age: 40
End: 2025-04-08
Payer: COMMERCIAL

## 2025-04-08 VITALS
WEIGHT: 150 LBS | BODY MASS INDEX: 26.58 KG/M2 | DIASTOLIC BLOOD PRESSURE: 68 MMHG | SYSTOLIC BLOOD PRESSURE: 104 MMHG | HEIGHT: 63 IN | HEART RATE: 74 BPM

## 2025-04-08 DIAGNOSIS — I73.00 RAYNAUD'S DISEASE WITHOUT GANGRENE: ICD-10-CM

## 2025-04-08 DIAGNOSIS — M34.9 SCLERODERMA (HCC): Primary | ICD-10-CM

## 2025-04-08 PROCEDURE — 99214 OFFICE O/P EST MOD 30 MIN: CPT | Performed by: INTERNAL MEDICINE

## 2025-04-08 NOTE — PROGRESS NOTES
Susie Herr is a 40 year old female.    HPI:     Chief Complaint   Patient presents with    Follow - Up     Scleroderma F/u     Raynaud's Syndrome       I had the pleasure of seeing Susei Herr on 4/8/2025 for follow up Scleroderma     Current medications:  Omeprazole daily   Pepcid as needed   Previous medications:  CellCept 1500 mg twice a day for skin symptoms, she was on it from 2018 till 2020  For Raynaud's she tried nifedipine, losartan, aspirin, Nitropaste, pentoxifylline, sildenafil.  They either did not work or she had side effects.  Sildenafil caused arrhythmias  Blood work:  +STEVEN 1:1280, nuclear and nucleolar pattern  Neg JAKOB panel, ESR, CRP, RF  Normal CBC and CMP (4/2024)  PFT 6/2024: normal  HRCT 5/2024: Dilated and patulous esophagus, 0.4 cm nodule in the right lung.  No ILD findings  Echocardiogram 5/2024: normal    Interval History:  This is a 38 yo F with hx of GERD presents to establish care for Scleroderma.  She was diagnosed with scleroderma in 2018 shortly after a pregnancy loss that was attributed to CMV.  She presented with puffy fingers, skin thickening of the arms and hands, GERD, Raynaud's.  She was initially following with rheumatologist at [facility] Dr. Xavier Mueller.  At that time she had significant Raynaud's with sores on her fingers.  She was treated with multiple medications such as nifedipine, losartan, aspirin, Nitropaste, pentoxifylline.  She also was on sildenafil which did help but caused arrhythmia which improved after stopping the medication.  She currently is not taking any medications for her Raynaud's.  In [place] her Raynaud's was overall controlled.  She also was started on CellCept during the initial diagnosis and was on it for about a year.  She feels like it helped with the skin tightening.  She stopped it due to family-planning.  She has 2 boys, a 3-year-old and 17-month-old.  She had a recent miscarriage in March 2024.  She has  significant GERD.  She is on omeprazole, Pepcid as needed.  She was seen by GI and recently started on Carafate.  She does have telangiectasias on her face and neck.  She was considering trying laser treatment to remove some of them.  Denies any shortness of breath or trouble swallowing.  Denies any swelling in her joints.  No rash or psoriasis.     11/5/2024:  Presents for follow-up of scleroderma  She is pregnant 21 weeks pregnant. This is her 3rd pregnancy   Minimal joint pain pain, no sob   Raynaud is not severe  GERD is stable  She was going to get EGD but will get it done after delivery     4/8/2025:  Presents for follow-up of scleroderma  She is 1 month post partum and doing well  She continues to have raynaud but not severe, no sores in the finger   No joint pain or swelling  No shortness of breath  Gerd hasn't worsened         HISTORY:  Past Medical History:    Chlamydia    Decorative tattoo    Esophagus disorder    Genital herpes simplex    Human papilloma virus infection    Miscarriage (HCC)    2-2024    Raynaud's syndrome    Scleroderma (HCC)      Social Hx Reviewed   Family Hx Reviewed     Medications (Active prior to today's visit):  Current Outpatient Medications   Medication Sig Dispense Refill    OMEPRAZOLE 40 MG Oral Capsule Delayed Release TAKE ONE CAPSULE BY MOUTH TWICE DAILY BEFORE MEALS(FIRST DOSE BEFORE BREAKFAST) 180 capsule 3    Calcium-Cholecalciferol (QC CALCIUM 500MG-D3) 500-5 MG-MCG Oral Tab       Loratadine 10 MG Oral Cap Take 10 mg by mouth as needed.      prenatal vitamin with DHA 27-0.8-228 MG Oral Cap Take 1 capsule by mouth daily.      famotidine (PEPCID AC) 10 MG Oral Tab Take 1 tablet (10 mg total) by mouth nightly as needed.       .cmed  Allergies:  Allergies[1]      ROS:   All other ROS are negative.     PHYSICAL EXAM:   GEN: AAOx3, NAD  HEENT: EOMI, PERRLA, no injection or icterus, oral mucosa moist, no oral lesions. No lymphadenopathy. No facial rash  CVS: RRR, no murmurs  rubs or gallops. Equal 2+ distal pulses.   LUNGS: CTAB, no increased work of breathing  ABDOMEN:  soft NT/ND, +BS, no HSM  SKIN: No rashes or skin lesions. No nail findings  MSK:  Hands overall puffy and thickened but no evidence of sclerodactyly. No skin tightening. Able to make a full fist   NEURO: Cranial nerves II-XII intact grossly. 5/5 strength throughout in both upper and lower extremities, sensation intact.  PSYCH: normal mood       LABS:     Component      Latest Ref Rng 5/17/2024   STEVEN SCREEN, IFA      NEGATIVE  POSITIVE !    DNA (DS) ANTIBODY      IU/mL <1    SCL-70 ANTIBODY      <1.0 NEG AI <1.0 NEG    SM ANTIBODY      <1.0 NEG AI <1.0 NEG    SM/RNP ANTIBODY      <1.0 NEG AI <1.0 NEG    SJOGREN'S ANTIBODY (SS-A)      <1.0 NEG AI <1.0 NEG    SJOGREN'S ANTIBODY (SS-B)      <1.0 NEG AI <1.0 NEG    STEVEN TITER      titer > OR = 1:1280 !    STEVEN PATTERN Nuclear, Nucleolar !    C-REACTIVE PROTEIN      <8.0 mg/L <3.0    SED RATE BY MODIFIED$WESTERGREN      < OR = 20 mm/h 2    RHEUMATOID FACTOR      <14 IU/mL <10      Imaging:     HRCT 5/2024:CONCLUSION:   Dilated and patulous esophagus consistent with patient's history of scleroderma.      Mosaic attenuation of the lungs bilaterally suggestive of air trapping which can be seen with small vessel or small airways disease.      0.4 cm nodule is seen within the right lower lobe. Followup chest CT recommended in 12 months to demonstrate resolution.       No architectural distortion, honeycombing or septal thickening to suggest interstitial lung disease at this point.     Echocardiogram 5/2024:  Conclusions:   Left ventricle: The cavity size was normal. Wall thickness was normal.   Systolic function was normal. The estimated ejection fraction was 60-65%, by   visual assessment. Wall motion is normal; there are no regional wall motion   abnormalities. Left ventricular diastolic function parameters were normal.     ASSESSMENT/PLAN:     Systemic scleroderma, CREST  syndrome- stable   - She was diagnosed in 2018.  Found to have a positive STEVEN nucleolar pattern with symptoms of skin tightening, significant GERD, esophageal dysmotility, Raynaud's, telangiectasias  - She was placed on many medications for her Raynaud's but either did not work or had side effects.  Currently her Raynaud's is stable  - For her acid reflux she is on omeprazole and Pepcid as needed.  Following with GI  - She was previously on CellCept from 2018 till 2020, feels like it helped her skin tightening.  Now off medication and stable  - PFT 6/2024: normal  - Echocardiogram 5/20243: normal  - repeat PFT and echocardiogram   - HRCT 5/2024: No evidence of ILD, esophageal dilatation and esophageal dysmotility noted  - She will stay off immunosuppressants as her symptoms are stable    Pt will f/u in 6 mos    There is a longitudinal care relationship with me, the care plan reflects the ongoing nature of the continuous relationship of care, and the medical record indicates that there is ongoing treatment of a serious/complex medical condition which I am currently managing.  is Applicable.     Analilia Burnett MD  4/8/2025  10:05 AM                 [1]   Allergies  Allergen Reactions    Gramineae Pollens UNKNOWN

## 2025-04-27 ENCOUNTER — PATIENT MESSAGE (OUTPATIENT)
Dept: GASTROENTEROLOGY | Facility: CLINIC | Age: 40
End: 2025-04-27

## 2025-04-28 RX ORDER — OMEPRAZOLE 40 MG/1
40 CAPSULE, DELAYED RELEASE ORAL
Qty: 90 CAPSULE | Refills: 3 | Status: SHIPPED | OUTPATIENT
Start: 2025-04-28

## 2025-04-28 NOTE — TELEPHONE ENCOUNTER
Requested Prescriptions     Pending Prescriptions Disp Refills    Omeprazole 40 MG Oral Capsule Delayed Release 180 capsule 3       ZERO refills remain on this prescription. Your patient is requesting advance approval of refills for this medication to PREVENT ANY MISSED DOSES       LOV: 04/17/2024  LR: 02/03/2025    gb

## 2025-05-01 ENCOUNTER — TELEPHONE (OUTPATIENT)
Dept: OBGYN CLINIC | Facility: CLINIC | Age: 40
End: 2025-05-01

## 2025-05-01 ENCOUNTER — POSTPARTUM (OUTPATIENT)
Dept: OBGYN CLINIC | Facility: CLINIC | Age: 40
End: 2025-05-01

## 2025-05-01 VITALS
HEART RATE: 76 BPM | BODY MASS INDEX: 28 KG/M2 | SYSTOLIC BLOOD PRESSURE: 98 MMHG | WEIGHT: 157.81 LBS | DIASTOLIC BLOOD PRESSURE: 64 MMHG

## 2025-05-01 DIAGNOSIS — N81.6 RECTOCELE: Primary | ICD-10-CM

## 2025-05-01 DIAGNOSIS — N81.6 CYSTOCELE WITH RECTOCELE: ICD-10-CM

## 2025-05-01 DIAGNOSIS — N81.10 CYSTOCELE WITH RECTOCELE: ICD-10-CM

## 2025-05-01 DIAGNOSIS — N81.12 CYSTOCELE, LATERAL: ICD-10-CM

## 2025-05-01 DIAGNOSIS — Z30.42 ENCOUNTER FOR MANAGEMENT AND INJECTION OF DEPO-PROVERA: ICD-10-CM

## 2025-05-01 PROBLEM — B00.9 HERPES SIMPLEX: Status: RESOLVED | Noted: 2022-09-29 | Resolved: 2025-05-01

## 2025-05-01 PROBLEM — R00.2 PALPITATIONS: Status: RESOLVED | Noted: 2019-03-13 | Resolved: 2025-05-01

## 2025-05-01 PROBLEM — R76.8 ANA POSITIVE: Status: RESOLVED | Noted: 2018-01-18 | Resolved: 2025-05-01

## 2025-05-01 PROBLEM — O47.00 THREATENED PREMATURE LABOR, ANTEPARTUM (HCC): Status: RESOLVED | Noted: 2025-01-23 | Resolved: 2025-05-01

## 2025-05-01 PROBLEM — K21.00 GASTROESOPHAGEAL REFLUX DISEASE WITH ESOPHAGITIS WITHOUT HEMORRHAGE: Status: RESOLVED | Noted: 2024-02-05 | Resolved: 2025-05-01

## 2025-05-01 PROBLEM — R10.13 DYSPEPSIA: Status: RESOLVED | Noted: 2019-08-13 | Resolved: 2025-05-01

## 2025-05-01 PROBLEM — R32 URINARY INCONTINENCE: Status: RESOLVED | Noted: 2021-07-28 | Resolved: 2025-05-01

## 2025-05-01 PROBLEM — G93.0 CHOROID PLEXUS CYST: Status: RESOLVED | Noted: 2020-12-15 | Resolved: 2025-05-01

## 2025-05-01 PROBLEM — O46.92 VAGINAL BLEEDING IN PREGNANCY, SECOND TRIMESTER (HCC): Status: RESOLVED | Noted: 2024-11-26 | Resolved: 2025-05-01

## 2025-05-01 PROBLEM — K22.4 ESOPHAGEAL DYSMOTILITY: Status: RESOLVED | Noted: 2024-02-05 | Resolved: 2025-05-01

## 2025-05-01 PROBLEM — O09.529 AMA (ADVANCED MATERNAL AGE) MULTIGRAVIDA 35+ (HCC): Status: RESOLVED | Noted: 2025-03-10 | Resolved: 2025-05-01

## 2025-05-01 PROBLEM — M34.9 SCLERODERMA (HCC): Status: RESOLVED | Noted: 2024-02-05 | Resolved: 2025-05-01

## 2025-05-01 PROBLEM — O09.523 AMA (ADVANCED MATERNAL AGE) MULTIGRAVIDA 35+, THIRD TRIMESTER (HCC): Status: RESOLVED | Noted: 2025-03-06 | Resolved: 2025-05-01

## 2025-05-01 PROBLEM — Z34.90 PREGNANCY (HCC): Status: RESOLVED | Noted: 2025-03-10 | Resolved: 2025-05-01

## 2025-05-01 PROCEDURE — 96372 THER/PROPH/DIAG INJ SC/IM: CPT | Performed by: OBSTETRICS & GYNECOLOGY

## 2025-05-01 RX ORDER — MEDROXYPROGESTERONE ACETATE 150 MG/ML
150 INJECTION, SUSPENSION INTRAMUSCULAR
Status: SHIPPED | OUTPATIENT
Start: 2025-05-01 | End: 2025-10-28

## 2025-05-01 RX ADMIN — MEDROXYPROGESTERONE ACETATE 150 MG: 150 INJECTION, SUSPENSION INTRAMUSCULAR at 13:53:00

## 2025-05-01 NOTE — PROGRESS NOTES
The following individual(s) verbally consented to be recorded using ambient AI listening technology and understand that they can each withdraw their consent to this listening technology at any point by asking the clinician to turn off or pause the recording:    Patient name: Susie Herr  Additional names:

## 2025-05-01 NOTE — PROGRESS NOTES
HPI    Susie Herr is a 40 year old female  here for 6 week post-partum visit.  Patient delivered a  male infant on 3/10/25 by .       getting vasectomy.  Wants depo in meantime.      Patient is breast feeding.   Patient denies symptoms of depression, [place]  depression scale score of 2 out of 30.    Last pap 2025.    Has cystocele/rectocele.  Already has done pelvic PT      OBSTETRIC HISTORY  OB History    Para Term  AB Living   7 3 3  4 3   SAB IAB Ectopic Multiple Live Births   3 1  0 3      # Outcome Date GA Lbr Tin/2nd Weight Sex Type Anes PTL Lv   7 Term 03/10/25 39w0d 01: 00:07 7 lb 1.2 oz (3.21 kg) M NORMAL SPONT EPI N BERTHA   6 SAB 24 7w3d    SAB None     5 Term 11/15/22 37w0d  6 lb 1 oz (2.75 kg) M NORMAL SPONT EPI Y BERTHA      Birth Comments: Bleeding issue in pregnancy d/t placenta previa, that resolved   4 SAB 2022 6w0d    SAB None     3 Term 21 40w3d  6 lb 10 oz (3.005 kg) M NORMAL SPONT EPI N BERTHA      Birth Comments: Forcep delivery      Complications: Temp 100.4 or greater, Intraamniotic Infection, Meconium   2 IAB 2018 24w0d   M THERAPEUTIC Gen        Birth Comments: Had CMV   1 2017 10w0d    SAB Gen        Birth Comments: D&C       MEDICATIONS:    Current Outpatient Medications:     Omeprazole 40 MG Oral Capsule Delayed Release, Take 1 capsule (40 mg total) by mouth every morning before breakfast., Disp: 90 capsule, Rfl: 3    Calcium-Cholecalciferol (QC CALCIUM 500MG-D3) 500-5 MG-MCG Oral Tab, , Disp: , Rfl:     Loratadine 10 MG Oral Cap, Take 10 mg by mouth as needed., Disp: , Rfl:     prenatal vitamin with DHA 27-0.8-228 MG Oral Cap, Take 1 capsule by mouth daily., Disp: , Rfl:     famotidine (PEPCID AC) 10 MG Oral Tab, Take 1 tablet (10 mg total) by mouth nightly as needed., Disp: , Rfl:     ALLERGIES:    Allergies   Allergen Reactions    Gramineae Pollens UNKNOWN       PHYSICAL EXAM  Blood pressure 98/64, pulse 76,  weight 157 lb 12.8 oz (71.6 kg), last menstrual period 06/10/2024, currently breastfeeding.  General:  Well nourished, well developed woman in no acute distress  Abdomen:  soft, nontender, no masses    External Genitalia: normal appearance, hair distribution, and no lesions  Vagina:  Normal appearance without lesions, no abnormal discharge  Cervix:  Normal without tenderness on motion  Uterus: normal in size, contour, position, mobility, without tenderness.  Uterus well supported.  Moderate cystocele.  Large rectocele. Hypermobility of bladder neck  Adnexa: normal without masses or tenderness  Perineum: well healed perineum      ASSESSMENT/PLAN  Normal Post partum exam  Cystocele with rectocele    Depo Provera 150 mg IM today.  Referral to Dr Green.  RTC annual

## 2025-05-02 ENCOUNTER — TELEPHONE (OUTPATIENT)
Dept: OBGYN CLINIC | Facility: CLINIC | Age: 40
End: 2025-05-02

## 2025-05-02 RX ORDER — OMEPRAZOLE 40 MG/1
40 CAPSULE, DELAYED RELEASE ORAL
Qty: 180 CAPSULE | Refills: 2 | Status: SHIPPED | OUTPATIENT
Start: 2025-05-02

## 2025-05-02 NOTE — TELEPHONE ENCOUNTER
Patient seen for postpartum yesterday was [facility]   Patient complains of body aches bilateral feels she has a hard time lifting, patient complains of head neck and jaw pain states jaw pain is feels like TMJ, unsure if fever, no injection site pain or redness, no vision changes. Headache is 6/10 after taking Motrin at 5:45am this morning.   This RN spoke with Dr. Arteaga who saw patient yesterday for postpartum visit.   Per Dr. Arteaga Jaw pain could be from patient clenching due to headache pain. Per Dr. Arteaga patient can try 600mg Motrin every 6 hours with Caffeine, patient aware if pain increases patient needs to be seen in ER. Patient aware to see PCP if continues.  Patient will attempt to take temp 6 hours after last taking Ibuprofen. Patient expressed understanding of all above.

## 2025-05-02 NOTE — TELEPHONE ENCOUNTER
Twice a day is a high dose  Could be due to scleroderma and motility but did not follow up with EGD. Would recommend but will fill for now.     Nedra Prince MD

## 2025-05-02 NOTE — TELEPHONE ENCOUNTER
Dr. Prince, patient requesting updated script reflecting to take 40 mg twice daily. Rx pended for you to review and sign off if appropriate. Thank you.

## 2025-05-02 NOTE — TELEPHONE ENCOUNTER
Patient received a depo shot yesterday. She has body aches, specifically head and neck pain that started yesterday. Please call to discuss.

## 2025-05-06 RX ORDER — OMEPRAZOLE 40 MG/1
40 CAPSULE, DELAYED RELEASE ORAL
Qty: 736 CAPSULE | Refills: 0 | OUTPATIENT
Start: 2025-05-06

## 2025-05-06 NOTE — TELEPHONE ENCOUNTER
Requested Prescriptions     Pending Prescriptions Disp Refills    OMEPRAZOLE 40 MG Oral Capsule Delayed Release [Pharmacy Med Name: OMEPRAZOLE 40MG CAPSULES] 736 capsule 0     Sig: TAKE 1 CAPSULE(40 MG) BY MOUTH TWICE DAILY BEFORE MEALS        Disp Refills Start End    Omeprazole 40 MG Oral Capsule Delayed Release 180 capsule 2 5/2/2025 --    Sig - Route: Take 1 capsule (40 mg total) by mouth 2 (two) times daily before meals. - Oral    Sent to pharmacy as: Omeprazole 40 MG Oral Capsule Delayed Release    Notes to Pharmacy: ZERO refills remain on this prescription. Your patient is requesting advance approval of refills for this medication to PREVENT ANY MISSED DOSES    E-Prescribing Status: Receipt confirmed by pharmacy (5/2/2025  9:26 AM CDT)      Pharmacy    [id number] - [place], [place] - [facility] [address] AT [facility] [address], [phone], [phone]       I spoke to pharmacist Hai and she stated that patient just picked up refill for once daily, even thought the prescription has changed 2 times daily, the insurance will not pay for another refill until 6/01/2025. They agreed to call patient with update.

## 2025-05-08 NOTE — TELEPHONE ENCOUNTER
[employer] message sent to pt instructing her to call office to either schedule a clinic appt or go over medical questions to get EGD scheduled. GI office number provided.

## 2025-05-21 ENCOUNTER — TELEPHONE (OUTPATIENT)
Facility: CLINIC | Age: 40
End: 2025-05-21

## 2025-05-21 DIAGNOSIS — K22.4 DYSKINESIA OF ESOPHAGUS: ICD-10-CM

## 2025-05-21 DIAGNOSIS — M34.9 SYSTEMIC SCLEROSIS (HCC): ICD-10-CM

## 2025-05-21 DIAGNOSIS — K21.00 GASTROESOPHAGEAL REFLUX DISEASE WITH ESOPHAGITIS, UNSPECIFIED WHETHER HEMORRHAGE: Primary | ICD-10-CM

## 2025-05-21 NOTE — TELEPHONE ENCOUNTER
Isaiah Barrera         Patient is now ready to schedule is it okay to use orders from 04/2024 please advise

## 2025-05-22 NOTE — TELEPHONE ENCOUNTER
RN called pt, no answer. Pt is scheduled for EGD on 8/12/25 but unsure if meds and medical hx was reviewed. Message left asking pt to call office to review. GI office number provided.

## 2025-05-22 NOTE — TELEPHONE ENCOUNTER
Scheduled for:  EGD 88751 possible biopsy/dilation [id number]  Provider Name:    Date:  Tuesday, 08/12/2025   Location:  [facility]   Sedation:  Mac   Time:  11:30 am (pt is aware [facility] will call with arrival time     Prep:  Npo  Meds/Allergies Reconciled?: Yes    Diagnosis with codes:  Gastroesophageal reflux disease with esophagitis without hemorrhage - K21.00  Esophageal dysmotility - k22.4  Scleroderma (HCC)--- m34.9  Was patient informed to call insurance with codes (Y/N):  Yes     Referral sent?:  Referral was sent at the time of electronic surgical scheduling.    EM or [facility] notified I sent an electronic request to Endo Scheduling and received a confirmation today.         Medication Orders:  None  Misc Orders:  None     Further instructions given by staff I discussed the prep instructions with the patient which she verbally understood and is aware that I will send the instructions today.via [employer]

## 2025-05-22 NOTE — TELEPHONE ENCOUNTER
Schedule EGD w/ possible biopsy/dilation w/MAC     ** If MAC @ [facility]:                 - NO alcohol, recreational drugs nor erectile dysfunction mediations 24 hours before procedure(s)                - NO herbal supplements or weight loss medications x 7 days prior to the procedure(s)     ** If MAC @ [facility] or IV twilight - continue all medications as prescribed

## 2025-05-23 NOTE — TELEPHONE ENCOUNTER
Dr. Prince,    I spoke to patient, no recent changes to her medications. However, wants to clarify if ok/safe to hold her multivitamin/supplements for 2 weeks before procedure on 8/12 since she is breastfeeding.     Can send [employer] message.

## 2025-06-12 ENCOUNTER — HOSPITAL ENCOUNTER (OUTPATIENT)
Dept: RESPIRATORY THERAPY | Facility: HOSPITAL | Age: 40
End: 2025-06-12
Attending: INTERNAL MEDICINE
Payer: COMMERCIAL

## 2025-06-17 ENCOUNTER — OFFICE VISIT (OUTPATIENT)
Dept: FAMILY MEDICINE CLINIC | Facility: CLINIC | Age: 40
End: 2025-06-17
Payer: COMMERCIAL

## 2025-06-17 VITALS
DIASTOLIC BLOOD PRESSURE: 69 MMHG | SYSTOLIC BLOOD PRESSURE: 102 MMHG | HEIGHT: 63 IN | WEIGHT: 155 LBS | HEART RATE: 86 BPM | TEMPERATURE: 98 F | BODY MASS INDEX: 27.46 KG/M2

## 2025-06-17 DIAGNOSIS — Z00.00 WELL ADULT EXAM: Primary | ICD-10-CM

## 2025-06-17 DIAGNOSIS — Z12.31 ENCOUNTER FOR SCREENING MAMMOGRAM FOR BREAST CANCER: ICD-10-CM

## 2025-06-17 DIAGNOSIS — J30.2 SEASONAL ALLERGIES: ICD-10-CM

## 2025-06-17 DIAGNOSIS — M34.9 SCLERODERMA (HCC): ICD-10-CM

## 2025-06-17 PROCEDURE — 99396 PREV VISIT EST AGE 40-64: CPT | Performed by: FAMILY MEDICINE

## 2025-06-17 RX ORDER — DOCUSATE SODIUM 100 MG/1
CAPSULE, LIQUID FILLED ORAL
COMMUNITY

## 2025-06-17 RX ORDER — CHOLECALCIFEROL (VITAMIN D3) 50 MCG
TABLET ORAL
COMMUNITY

## 2025-06-17 NOTE — PROGRESS NOTES
Subjective:   Susie Herr is a 40 year old female who presents for a {Medicare Annual Wellness Description:3401} and {Medicare AWV HPI for better documentation of chronic or new problems to help justify LOS with AWV codes:75907}.   History of Present Illness      ***  History/Other:   Fall Risk Assessment: {Tip  Fall Risk Assessment:8307}  *** (Incomplete)  {Tip  Fall risk assessment incomplete. Refresh to ensure screening pulls in; if not, click link above to assess, then refresh:8307}      Cognitive Assessment: {Tip  Cognitive Assessment:8307}  *** (Incomplete)  Tip  Cognitive assessment incomplete. Refresh to ensure screening pulls in; if not,click link above to assess, then refresh:8307}      Functional Ability/Status: {Tip  Functional Status:8307}  *** (Incomplete)  {Tip  Functional status incomplete. Refresh to ensure screening pulls in; if not, click link above to assess, then refresh:8307}      Depression Screening (PHQ):{Tip  Depression Screenin}  PHQ-2/9 not done in last week, please ask questions. Last done  *** {Tip  Refresh link after completin}     {Option to record time spent screening & counseling patient for depression (5+ minutes = ):56138}    Advanced Directives: {Tip  Advance Care Plannin}  She does NOT have a Living Will. [ ]  She does NOT have a Power of  for Health Care. [ ]  {Advanced Directive Status:7286}    {Tip ResultsPMHPSHFHProbListImagingCardioLabAllergiesImm :8307}  Patient Active Problem List   Diagnosis   • Raynaud's disease without gangrene   • AMA--40   • Cystocele, midline   • Keratosis pilaris   • Rectocele   • Systemic sclerosis with limited cutaneous involvement (HCC)   • Genital herpes     Allergies:  She is allergic to gramineae pollens.    Current Medications:  Outpatient Medications Marked as Taking for the 25 encounter (Office Visit) with Nuria Livingston MD   Medication Sig   • docusate sodium (COLACE) 100  MG Oral Cap    • Cholecalciferol (VITAMIN D) 50 MCG (2000 UT) Oral Tab    • Omeprazole 40 MG Oral Capsule Delayed Release Take 1 capsule (40 mg total) by mouth 2 (two) times daily before meals.   • Calcium-Cholecalciferol (QC CALCIUM 500MG-D3) 500-5 MG-MCG Oral Tab    • Loratadine 10 MG Oral Cap Take 10 mg by mouth as needed.   • prenatal vitamin with DHA 27-0.8-228 MG Oral Cap Take 1 capsule by mouth daily.   • famotidine (PEPCID AC) 10 MG Oral Tab Take 1 tablet (10 mg total) by mouth nightly as needed.     Current Facility-Administered Medications for the 6/17/25 encounter (Office Visit) with Nuria Livingston MD   Medication   • medroxyPROGESTERone Acetate MICHEAL 150 mg       Medical History:  She  has a past medical history of Chlamydia, Decorative tattoo, Esophagus disorder, Genital herpes simplex, Human papilloma virus infection, Miscarriage (HCC), Raynaud's syndrome, and Scleroderma (HCC).  Surgical History:  She  has a past surgical history that includes other surgical history (2006); d & c; egd; and other accessory.   Family History:  Her family history includes Cancer in her father; Hypertension in her father; Other in her maternal aunt and maternal grandmother.  Social History:  She  reports that she has never smoked. She has never used smokeless tobacco. She reports that she does not currently use alcohol after a past usage of about 2.0 standard drinks of alcohol per week. She reports that she does not use drugs.    Tobacco:  She has never smoked tobacco.    CAGE Alcohol Screen: {Tip  CAGE Alcohol Screen:8307}  *** Cage NOT Performed in the last 6 months, please do assessment! Last Cage score was  on .    {Tip   Care Team:8307}  Patient Care Team:  Sandra Arteaga MD (OBSTETRICS & GYNECOLOGY)  Padma Persaud MD (OBSTETRICS & GYNECOLOGY)  Amos Ramirez DO (OBSTETRICS & GYNECOLOGY)  Dmitriy Ledezma DO (OBSTETRICS & GYNECOLOGY)  Jillian Fisher MD (OBSTETRICS & GYNECOLOGY)  Rosenda Choi MD (OBSTETRICS  & GYNECOLOGY)    Review of Systems  {Female Review of Systems (Optional):7352}    Objective:   Physical Exam  {Use this to document a Female Complete Physical Exam (pelvic deferred) - Defaults to Blank -DEL to delete as it is not needed for AWV but is needed for CPX or Medicare Supervisit:6118}    /69   Pulse 86   Temp 97.8 °F (36.6 °C) (Temporal)   Ht 5' 3\" (1.6 m)   Wt 155 lb (70.3 kg)   LMP 06/10/2024 (Exact Date)   Breastfeeding Yes   BMI 27.46 kg/m²  Estimated body mass index is 27.46 kg/m² as calculated from the following:    Height as of this encounter: 5' 3\" (1.6 m).    Weight as of this encounter: 155 lb (70.3 kg).    Medicare Hearing Assessment: {Tip (Required for AWV/SWV) Hearing Assessment:8307}  *** (Incomplete)  {Tip  Hearing Screening incomplete. Refresh to ensure screening pulls in; if not, click link above to assess, then refresh:830    {Tip  Vision Screenin}  {Tip  Vision Screening incomplete. Required for IPPE/first MA Supervisit. Click link above to assess, then refresh. If vision screening not recorded, this link will disappear upon signing note/encounter:8307}    Assessment & Plan:   Susie Herr is a 40 year old female who presents for a Medicare Assessment.     1. Encounter for annual health examination (Primary)    Assessment & Plan      The patient indicates understanding of these issues and agrees to the plan.  {lifestyle and A/P options:5845::\"Reinforced healthy diet, lifestyle, and exercise.\"}    {Tip  Follow Up:8307}  No follow-ups on file.     Nuria Livingston MD, 2025     Supplementary Documentation:   General Health:       Health Maintenance   Topic Date Due   • Mammogram  Never done   • COVID-19 Vaccine ( season) 2024   • Annual Physical  2025   • Pap Smear  2025   • DTaP,Tdap,and Td Vaccines (5 - Td or Tdap) 2035   • Influenza Vaccine  Completed   • Annual Depression Screening  Completed   • Pneumococcal  Vaccine: Birth to 50yrs  Aged Out   • Meningococcal B Vaccine  Aged Out

## 2025-06-17 NOTE — PROGRESS NOTES
HPI:    Patient ID: Susie Herr is a 40 year old female.   Chief Complaint   Patient presents with    Routine Physical     Sees gyne       Referral     For rehumatologist         History of Present Illness       HPI    Wt Readings from Last 6 Encounters:   06/17/25 155 lb (70.3 kg)   05/01/25 157 lb 12.8 oz (71.6 kg)   04/08/25 150 lb (68 kg)   03/19/25 150 lb (68 kg)   03/10/25 177 lb (80.3 kg)   03/06/25 177 lb (80.3 kg)     BP Readings from Last 3 Encounters:   06/17/25 102/69   05/01/25 98/64   04/08/25 104/68       3 boys, youngest is  3 months old.  Has never had a mammo  She is breast feeding.  Has a referral to see urogyne    Has gerd, has a dilated esophagus, scheduled for egd    Still taking prenatals, vitamin D      Review of Systems   Constitutional:  Negative for activity change, appetite change, chills, fatigue, fever and unexpected weight change.   HENT:  Negative for congestion, dental problem, drooling, ear discharge, ear pain, facial swelling, hearing loss, mouth sores, nosebleeds, postnasal drip, rhinorrhea, sinus pressure, sinus pain, sneezing, sore throat, tinnitus, trouble swallowing and voice change.    Eyes:  Negative for pain, discharge, redness and visual disturbance.   Respiratory:  Negative for cough, shortness of breath and wheezing.    Cardiovascular:  Negative for chest pain, palpitations and leg swelling.   Gastrointestinal:  Negative for abdominal pain, anal bleeding, blood in stool, constipation, diarrhea, nausea, rectal pain and vomiting.   Endocrine: Negative for cold intolerance, heat intolerance, polydipsia, polyphagia and polyuria.   Genitourinary:  Negative for decreased urine volume, difficulty urinating, dysuria, flank pain, frequency, menstrual problem, pelvic pain, urgency, vaginal bleeding, vaginal discharge and vaginal pain.        Postpartum, and on depo   Musculoskeletal:  Negative for arthralgias, back pain and myalgias.   Skin:  Negative for rash.    Neurological:  Negative for dizziness, seizures, syncope, weakness, numbness and headaches.   Hematological:  Does not bruise/bleed easily.   Psychiatric/Behavioral:  Negative for behavioral problems, decreased concentration, self-injury, sleep disturbance and suicidal ideas. The patient is not nervous/anxious.        /69   Pulse 86   Temp 97.8 °F (36.6 °C) (Temporal)   Ht 5' 3\" (1.6 m)   Wt 155 lb (70.3 kg)   LMP 06/10/2024 (Exact Date)   Breastfeeding Yes   BMI 27.46 kg/m²     Past Medical History[1]  Past Surgical History[2]  Social History     Socioeconomic History    Marital status:      Spouse name: Not on file    Number of children: Not on file    Years of education: Not on file    Highest education level: Not on file   Occupational History    Not on file   Tobacco Use    Smoking status: Never    Smokeless tobacco: Never   Vaping Use    Vaping status: Never Used   Substance and Sexual Activity    Alcohol use: Not Currently     Alcohol/week: 2.0 standard drinks of alcohol     Types: 2 Glasses of wine per week     Comment: occasional, prior to pregnancy    Drug use: Never    Sexual activity: Yes   Other Topics Concern    Not on file   Social History Narrative    Not on file     Social Drivers of Health     Food Insecurity: No Food Insecurity (3/10/2025)    NCSS - Food Insecurity     Worried About Running Out of Food in the Last Year: No     Ran Out of Food in the Last Year: No   Transportation Needs: No Transportation Needs (3/10/2025)    NCSS - Transportation     Lack of Transportation: No   Stress: No Stress Concern Present (3/10/2025)    Stress     Feeling of Stress : No   Housing Stability: Not At Risk (3/10/2025)    NCSS - Housing/Utilities     Has Housing: Yes     Worried About Losing Housing: No     Unable to Get Utilities: No     Family History[3]    Immunization History   Administered Date(s) Administered    Covid-19 Vaccine Moderna 100 mcg/0.5 ml 01/02/2021, 01/30/2021     Covid-19 Vaccine Moderna 50 Mcg/0.25 Ml 11/02/2021    Covid-19 Vaccine Pfizer Bivalent 30mcg/0.3mL 10/13/2022    FLUZONE 6 months and older PFS 0.5 ml (20900) 10/22/2018, 09/03/2020, 09/08/2021, 10/11/2022    Hep B, Unspecified Formulation 01/17/1998, 02/21/1998, 06/20/1998, 11/21/2011    Influenza Vaccine, trivalent (IIV3), PF 0.5mL (85856) 10/22/2024    Influenza Virus Vaccine, Split 10/09/2019    MMR 04/17/1986, 05/22/1993    Pneumococcal (Prevnar 13) 02/13/2020    TDAP 10/19/2009, 04/08/2021, 10/17/2022, 01/23/2025       Health Maintenance   Topic Date Due    Mammogram  Never done    COVID-19 Vaccine (5 - 2024-25 season) 09/01/2024    Annual Physical  04/18/2025    Pap Smear  05/30/2025    DTaP,Tdap,and Td Vaccines (5 - Td or Tdap) 01/23/2035    Influenza Vaccine  Completed    Annual Depression Screening  Completed    Pneumococcal Vaccine: Birth to 50yrs  Aged Out    Meningococcal B Vaccine  Aged Out        Current Medications[4]  Allergies:Allergies[5]   PHYSICAL EXAM:     Chief Complaint   Patient presents with    Routine Physical     Sees gyne       Referral     For rehumatologist         Physical Exam  Vitals and nursing note reviewed.   Constitutional:       Appearance: She is well-developed.   HENT:      Head: Normocephalic and atraumatic.      Right Ear: External ear normal.      Left Ear: External ear normal.      Nose: Nose normal.      Mouth/Throat:      Pharynx: No oropharyngeal exudate.   Eyes:      General:         Right eye: No discharge.         Left eye: No discharge.      Conjunctiva/sclera: Conjunctivae normal.      Pupils: Pupils are equal, round, and reactive to light.   Neck:      Thyroid: No thyromegaly.   Cardiovascular:      Rate and Rhythm: Normal rate and regular rhythm.      Heart sounds: Normal heart sounds. No murmur heard.  Pulmonary:      Effort: Pulmonary effort is normal.      Breath sounds: Normal breath sounds. No wheezing.   Abdominal:      General: Bowel sounds are normal.       Palpations: Abdomen is soft. There is no mass.      Tenderness: There is no abdominal tenderness.   Musculoskeletal:         General: No tenderness.      Cervical back: Normal range of motion and neck supple.   Lymphadenopathy:      Cervical: No cervical adenopathy.   Skin:     General: Skin is dry.      Findings: No rash.   Neurological:      Mental Status: She is alert and oriented to person, place, and time.      Cranial Nerves: No cranial nerve deficit.      Motor: No abnormal muscle tone.      Coordination: Coordination normal.      Deep Tendon Reflexes: Reflexes are normal and symmetric. Reflexes normal.   Psychiatric:         Behavior: Behavior normal.         Thought Content: Thought content normal.         Judgment: Judgment normal.                ASSESSMENT/PLAN:     Return yearly for physicals  Follow up with dentist every 6 months  Follow up with eye doctor yearly  Recommend aerobic exercise for at least 30mins 5 days a week  Yearly flu shot  Tetanus booster every 10 years (Tdap/ Td)  Labs ordered/ or reviewed if done prior to appointment   1. Well adult exam    - CBC With Differential With Platelet  - Comp Metabolic Panel (14)  - Lipid Panel  - TSH W Reflex To Free T4  - Hemoglobin A1C    2. Seasonal allergies      3. Encounter for screening mammogram for breast cancer    - [facility] CARI 2D+3D SCREENING BILAT (CPT=77067/83870); Future    4. Scleroderma (HCC)    - Rheumatology Referral - In Network        Assessment & Plan         Orders Placed This Encounter   Procedures    CBC With Differential With Platelet    Comp Metabolic Panel (14)    Lipid Panel    TSH W Reflex To Free T4    Hemoglobin A1C       The above note was creating using Dragon speech recognition technology. Please excuse any typos    Meds This Visit:  Requested Prescriptions      No prescriptions requested or ordered in this encounter       Imaging & Referrals:  RHEUMATOLOGY - INTERNAL  [facility] CARI 2D+3D SCREENING BILAT (CPT=77067/06470)        ID#[id number]       [1]   Past Medical History:   Chlamydia    Decorative tattoo    Esophagus disorder    Genital herpes simplex    Human papilloma virus infection    Miscarriage (HCC)    2-2024    Raynaud's syndrome    Scleroderma ([facility])   [2]   Past Surgical History:  Procedure Laterality Date    D & c      Egd      Other accessory      ex lap for bowel injury after appendectomy    Other surgical history  2006    apendoctomy   [3]   Family History  Problem Relation Age of Onset    Cancer Father     Hypertension Father     Other (Other) Maternal Grandmother     Other (Other) Maternal Aunt    [4]   Current Outpatient Medications   Medication Sig Dispense Refill    docusate sodium (COLACE) 100 MG Oral Cap       Cholecalciferol (VITAMIN D) 50 MCG (2000 UT) Oral Tab       Omeprazole 40 MG Oral Capsule Delayed Release Take 1 capsule (40 mg total) by mouth 2 (two) times daily before meals. 180 capsule 2    Calcium-Cholecalciferol (QC CALCIUM 500MG-D3) 500-5 MG-MCG Oral Tab       Loratadine 10 MG Oral Cap Take 10 mg by mouth as needed.      prenatal vitamin with DHA 27-0.8-228 MG Oral Cap Take 1 capsule by mouth daily.      famotidine (PEPCID AC) 10 MG Oral Tab Take 1 tablet (10 mg total) by mouth nightly as needed.     [5] No Known Allergies

## 2025-06-17 NOTE — PROGRESS NOTES
HPI:    Patient ID: Susie Herr is a 40 year old female.   Chief Complaint   Patient presents with    Routine Physical     Sees gyne       Referral     For rehumatologist            HPI    Wt Readings from Last 6 Encounters:   06/17/25 155 lb (70.3 kg)   05/01/25 157 lb 12.8 oz (71.6 kg)   04/08/25 150 lb (68 kg)   03/19/25 150 lb (68 kg)   03/10/25 177 lb (80.3 kg)   03/06/25 177 lb (80.3 kg)     BP Readings from Last 3 Encounters:   06/17/25 102/69   05/01/25 98/64   04/08/25 104/68         Review of Systems    /69   Pulse 86   Temp 97.8 °F (36.6 °C) (Temporal)   Ht 5' 3\" (1.6 m)   Wt 155 lb (70.3 kg)   LMP 06/10/2024 (Exact Date)   Breastfeeding Yes   BMI 27.46 kg/m²     Past Medical History[1]  Past Surgical History[2]  Social History     Socioeconomic History    Marital status:      Spouse name: Not on file    Number of children: Not on file    Years of education: Not on file    Highest education level: Not on file   Occupational History    Not on file   Tobacco Use    Smoking status: Never    Smokeless tobacco: Never   Vaping Use    Vaping status: Never Used   Substance and Sexual Activity    Alcohol use: Not Currently     Alcohol/week: 2.0 standard drinks of alcohol     Types: 2 Glasses of wine per week     Comment: occasional, prior to pregnancy    Drug use: Never    Sexual activity: Yes   Other Topics Concern    Not on file   Social History Narrative    Not on file     Social Drivers of Health     Food Insecurity: No Food Insecurity (3/10/2025)    NCSS - Food Insecurity     Worried About Running Out of Food in the Last Year: No     Ran Out of Food in the Last Year: No   Transportation Needs: No Transportation Needs (3/10/2025)    NCSS - Transportation     Lack of Transportation: No   Stress: No Stress Concern Present (3/10/2025)    Stress     Feeling of Stress : No   Housing Stability: Not At Risk (3/10/2025)    NCSS - Housing/Utilities     Has Housing: Yes     Worried About  Losing Housing: No     Unable to Get Utilities: No     Family History[3]    Immunization History   Administered Date(s) Administered    Covid-19 Vaccine Moderna 100 mcg/0.5 ml 01/02/2021, 01/30/2021    Covid-19 Vaccine Moderna 50 Mcg/0.25 Ml 11/02/2021    Covid-19 Vaccine Pfizer Bivalent 30mcg/0.3mL 10/13/2022    FLUZONE 6 months and older PFS 0.5 ml (09546) 10/22/2018, 09/03/2020, 09/08/2021, 10/11/2022    Hep B, Unspecified Formulation 01/17/1998, 02/21/1998, 06/20/1998, 11/21/2011    Influenza Vaccine, trivalent (IIV3), PF 0.5mL (76941) 10/22/2024    Influenza Virus Vaccine, Split 10/09/2019    MMR 04/17/1986, 05/22/1993    Pneumococcal (Prevnar 13) 02/13/2020    TDAP 10/19/2009, 04/08/2021, 10/17/2022, 01/23/2025       Health Maintenance   Topic Date Due    Mammogram  Never done    COVID-19 Vaccine (5 - 2024-25 season) 09/01/2024    Annual Physical  04/18/2025    Pap Smear  05/30/2025    DTaP,Tdap,and Td Vaccines (5 - Td or Tdap) 01/23/2035    Influenza Vaccine  Completed    Annual Depression Screening  Completed    Pneumococcal Vaccine: Birth to 50yrs  Aged Out    Meningococcal B Vaccine  Aged Out        Current Medications[4]  Allergies:Allergies[5]   PHYSICAL EXAM:     Chief Complaint   Patient presents with    Routine Physical     Sees gyne       Referral     For rehumatologist         Physical Exam           ASSESSMENT/PLAN:     Return yearly for physicals  Follow up with dentist every 6 months  Follow up with eye doctor yearly  Recommend aerobic exercise for at least 30mins 5 days a week  Yearly flu shot  Tetanus booster every 10 years (Tdap/ Td)  Labs ordered/ or reviewed if done prior to appointment     1. Encounter for annual health examination  ***           No orders of the defined types were placed in this encounter.      The above note was creating using Dragon speech recognition technology. Please excuse any typos    Meds This Visit:  Requested Prescriptions      No prescriptions requested or  ordered in this encounter       Imaging & Referrals:  None       ID#[id number]       [1]   Past Medical History:   Chlamydia    Decorative tattoo    Esophagus disorder    Genital herpes simplex    Human papilloma virus infection    Miscarriage (HCC)    2-2024    Raynaud's syndrome    Scleroderma ([facility])   [2]   Past Surgical History:  Procedure Laterality Date    D & c      Egd      Other accessory      ex lap for bowel injury after appendectomy    Other surgical history  2006    apendoctomy   [3]   Family History  Problem Relation Age of Onset    Cancer Father     Hypertension Father     Other (Other) Maternal Grandmother     Other (Other) Maternal Aunt    [4]   Current Outpatient Medications   Medication Sig Dispense Refill    docusate sodium (COLACE) 100 MG Oral Cap       Cholecalciferol (VITAMIN D) 50 MCG (2000 UT) Oral Tab       Omeprazole 40 MG Oral Capsule Delayed Release Take 1 capsule (40 mg total) by mouth 2 (two) times daily before meals. 180 capsule 2    Calcium-Cholecalciferol (QC CALCIUM 500MG-D3) 500-5 MG-MCG Oral Tab       Loratadine 10 MG Oral Cap Take 10 mg by mouth as needed.      prenatal vitamin with DHA 27-0.8-228 MG Oral Cap Take 1 capsule by mouth daily.      famotidine (PEPCID AC) 10 MG Oral Tab Take 1 tablet (10 mg total) by mouth nightly as needed.     [5]   Allergies  Allergen Reactions    Gramineae Pollens UNKNOWN

## 2025-06-21 LAB
ABSOLUTE BASOPHILS: 51 CELLS/UL (ref 0–200)
ABSOLUTE EOSINOPHILS: 51 CELLS/UL (ref 15–500)
ABSOLUTE LYMPHOCYTES: 1907 CELLS/UL (ref 850–3900)
ABSOLUTE MONOCYTES: 291 CELLS/UL (ref 200–950)
ABSOLUTE NEUTROPHILS: 2800 CELLS/UL (ref 1500–7800)
ALBUMIN/GLOBULIN RATIO: 1.8 (CALC) (ref 1–2.5)
ALBUMIN: 4.4 G/DL (ref 3.6–5.1)
ALKALINE PHOSPHATASE: 64 U/L (ref 31–125)
ALT: 17 U/L (ref 6–29)
AST: 17 U/L (ref 10–30)
BASOPHILS: 1 %
BILIRUBIN, TOTAL: 0.6 MG/DL (ref 0.2–1.2)
BUN: 19 MG/DL (ref 7–25)
CALCIUM: 9.3 MG/DL (ref 8.6–10.2)
CARBON DIOXIDE: 24 MMOL/L (ref 20–32)
CHLORIDE: 107 MMOL/L (ref 98–110)
CHOL/HDLC RATIO: 2.6 (CALC)
CHOLESTEROL, TOTAL: 169 MG/DL
CREATININE: 0.87 MG/DL (ref 0.5–0.99)
EGFR: 86 ML/MIN/1.73M2
EOSINOPHILS: 1 %
GLOBULIN: 2.4 G/DL (CALC) (ref 1.9–3.7)
GLUCOSE: 81 MG/DL (ref 65–99)
HDL CHOLESTEROL: 66 MG/DL
HEMATOCRIT: 43.6 % (ref 35–45)
HEMOGLOBIN A1C: 5.1 %
HEMOGLOBIN: 13.7 G/DL (ref 11.7–15.5)
LDL-CHOLESTEROL: 92 MG/DL (CALC)
LYMPHOCYTES: 37.4 %
MCH: 27.2 PG (ref 27–33)
MCHC: 31.4 G/DL (ref 32–36)
MCV: 86.7 FL (ref 80–100)
MONOCYTES: 5.7 %
MPV: 10.2 FL (ref 7.5–12.5)
NEUTROPHILS: 54.9 %
NON-HDL CHOLESTEROL: 103 MG/DL (CALC)
PLATELET COUNT: 249 THOUSAND/UL (ref 140–400)
POTASSIUM: 4.3 MMOL/L (ref 3.5–5.3)
PROTEIN, TOTAL: 6.8 G/DL (ref 6.1–8.1)
RDW: 14.2 % (ref 11–15)
RED BLOOD CELL COUNT: 5.03 MILLION/UL (ref 3.8–5.1)
SODIUM: 141 MMOL/L (ref 135–146)
TRIGLYCERIDES: 38 MG/DL
TSH W/REFLEX TO FT4: 1.33 MIU/L
WHITE BLOOD CELL COUNT: 5.1 THOUSAND/UL (ref 3.8–10.8)

## 2025-06-23 ENCOUNTER — NST DOCUMENTATION (OUTPATIENT)
Dept: OBGYN CLINIC | Facility: CLINIC | Age: 40
End: 2025-06-23

## 2025-06-23 NOTE — NST
Nonstress Test   Patient: Susie Nava Nemesio      Diagnosis from order:   AMA      2/11/25 NST: 125/mod/R    Amos Ramirez DO  6/23/2025  5:13 PM

## 2025-07-15 ENCOUNTER — HOSPITAL ENCOUNTER (OUTPATIENT)
Dept: CV DIAGNOSTICS | Facility: HOSPITAL | Age: 40
Discharge: HOME | End: 2025-07-15
Attending: INTERNAL MEDICINE
Payer: COMMERCIAL

## 2025-07-15 ENCOUNTER — HOSPITAL ENCOUNTER (OUTPATIENT)
Dept: RESPIRATORY THERAPY | Facility: HOSPITAL | Age: 40
Discharge: HOME | End: 2025-07-15
Attending: INTERNAL MEDICINE
Payer: COMMERCIAL

## 2025-07-15 DIAGNOSIS — M34.9 SCLERODERMA (HCC): ICD-10-CM

## 2025-07-15 DIAGNOSIS — I73.00 RAYNAUD'S DISEASE WITHOUT GANGRENE: ICD-10-CM

## 2025-07-15 PROCEDURE — 94726 PLETHYSMOGRAPHY LUNG VOLUMES: CPT | Performed by: INTERNAL MEDICINE

## 2025-07-15 PROCEDURE — 93306 TTE W/DOPPLER COMPLETE: CPT | Performed by: INTERNAL MEDICINE

## 2025-07-15 PROCEDURE — 94729 DIFFUSING CAPACITY: CPT | Performed by: INTERNAL MEDICINE

## 2025-07-15 PROCEDURE — 94010 BREATHING CAPACITY TEST: CPT | Performed by: INTERNAL MEDICINE

## 2025-07-15 NOTE — PROCEDURES
PULMONARY FUNCTION TESTING INTERPRETATION        Spirometry:  Forced vital capacity (FVC) is: nl   Forced expiratory volume in 1 second (FEV1) is: nl   FEV1/FVC ratio: nl   Significant bronchodilator response? No performed         Flow Volume Loop:  Normal        Lung Volume:  Total lung capacity (TLC) is: nl   Residual volume (RV) is: nl         Diffusing Capacity:  Nl         Interpretation:  Normal spirometry.   No bronchodilator testing was performed.   Normal flow volume loop.  Normal lung volumes.   Normal diffusing capacity.   Normal pulmonary function testing.   Compared to previous testing done 6/6/24: FVC and FEV1 are stable, TLC and DLCO have improved slightly.   Correlate clinically.  The PFT raw data is available in EPIC EMR under CHART REVIEW under the PROCEDURES tab. Please click on the the PFT and there should be an attached hyperlink which shows the attached test when clicked on.     Electronically signed by    VICTORIA Olsen DO, MPH  Pulmonary Critical Care Medicine  [facility] Pulmonary and Critical Care Medicine

## 2025-07-24 PROBLEM — E66.3 OVERWEIGHT (BMI 25.0-29.9): Status: ACTIVE | Noted: 2025-07-24

## 2025-08-12 PROBLEM — K31.9 GASTRIC ERYTHEMA: Status: ACTIVE | Noted: 2025-08-12

## 2025-08-12 PROBLEM — K44.9 HIATAL HERNIA: Status: ACTIVE | Noted: 2025-08-12

## 2025-08-27 ENCOUNTER — RESULTS FOLLOW-UP (OUTPATIENT)
Facility: CLINIC | Age: 40
End: 2025-08-27

## 2025-08-28 ENCOUNTER — HOSPITAL ENCOUNTER (OUTPATIENT)
Dept: MAMMOGRAPHY | Facility: HOSPITAL | Age: 40
Discharge: HOME | End: 2025-08-28
Attending: FAMILY MEDICINE

## 2025-08-28 DIAGNOSIS — Z12.31 ENCOUNTER FOR SCREENING MAMMOGRAM FOR BREAST CANCER: ICD-10-CM

## 2025-08-28 PROCEDURE — 77063 BREAST TOMOSYNTHESIS BI: CPT | Performed by: FAMILY MEDICINE

## 2025-08-28 PROCEDURE — 77067 SCR MAMMO BI INCL CAD: CPT | Performed by: FAMILY MEDICINE

## (undated) NOTE — LETTER
[facility]  Administration of Anesthesia  I, Susie Herr agree to be cared for by a physician anesthesiologist alone and/or with a nurse anesthetist, who is specially trained to monitor me and give me medicine to put me to sleep or keep me comfortable during my procedure    I understand that my anesthesiologist and/or anesthetist is not an employee or agent of [facility] or [employer]. He or she works for [facility]    As the patient asking for anesthesia services, I agree to:  Allow the anesthesiologist (anesthesia doctor) to give me medicine and do additional procedures as necessary. Some examples are: Starting or using an “IV” to give me medicine, fluids or blood during my procedure, and having a breathing tube placed to help me breathe when I’m asleep (intubation). In the event that my heart stops working properly, I understand that my anesthesiologist will make every effort to sustain my life, unless otherwise directed by [facility] Do Not Resuscitate documents.  Tell my anesthesia doctor before my procedure:  If I am pregnant.  The last time that I ate or drank.  iii. All of the medicines I take (including prescriptions, herbal supplements, and pills I can buy without a prescription (including street drugs/illegal medications). Failure to inform my anesthesiologist about these medicines may increase my risk of anesthetic complications.  iv.If I am allergic to anything or have had a reaction to anesthesia before.  I understand how the anesthesia medicine will help me (benefits).  I understand that with any type of anesthesia medicine there are risks:  The most common risks are: nausea, vomiting, sore throat, muscle soreness, damage to my eyes, mouth, or teeth (from breathing tube placement).  Rare risks include: remembering what happened during my procedure, allergic reactions to medications, injury to my airway, heart, lungs, vision,  nerves, or muscles and in extremely rare instances death.  My doctor has explained to me other choices available to me for my care (alternatives).  Pregnant Patients (“epidural”):  I understand that the risks of having an epidural (medicine given into my back to help control pain during labor), include itching, low blood pressure, difficulty urinating, headache or slowing of the baby’s heart. Very rare risks include infection, bleeding, seizure, irregular heart rhythms and nerve injury.  Regional Anesthesia (“spinal”, “epidural”, & “nerve blocks”):  I understand that rare but potential complications include headache, bleeding, infection, seizure, irregular heart rhythms, and nerve injury.    _____________________________________________________________________________  Patient (or Representative) Signature/Relationship to Patient  Date   Time    _____________________________________________________________________________   Name (if used)    Language/Organization   Time    _____________________________________________________________________________  Nurse Anesthetist Signature     Date   Time  _____________________________________________________________________________  Anesthesiologist Signature     Date   Time  I have discussed the procedure and information above with the patient (or patient’s representative) and answered their questions. The patient or their representative has agreed to have anesthesia services.    _____________________________________________________________________________  Witness        Date   Time  I have verified that the signature is that of the patient or patient’s representative, and that it was signed before the procedure  Patient Name: Susie Herr     : 1985                 Printed: 3/10/2025 at 5:14 AM    Medical Record #: [id number]                                            Page 1 of 1  ----------ANESTHESIA CONSENT----------

## (undated) NOTE — Clinical Note
1.  IUP at 38w3d 2.  Normal amniotic fluid index 3.  Reassuring  testing; BPP   This was an ultrasound only encounter (no physician visit).  The ultrasound was read by Dr. Lomax and the report was sent to Dr. Persaud to discuss with the patient.